# Patient Record
Sex: MALE | Race: WHITE | NOT HISPANIC OR LATINO | Employment: OTHER | URBAN - METROPOLITAN AREA
[De-identification: names, ages, dates, MRNs, and addresses within clinical notes are randomized per-mention and may not be internally consistent; named-entity substitution may affect disease eponyms.]

---

## 2017-01-26 ENCOUNTER — LAB CONVERSION - ENCOUNTER (OUTPATIENT)
Dept: OTHER | Facility: OTHER | Age: 72
End: 2017-01-26

## 2017-01-26 LAB — PROSTATE SPECIFIC ANTIGEN TOTAL (HISTORICAL): 1.6 NG/ML

## 2017-02-23 ENCOUNTER — ALLSCRIPTS OFFICE VISIT (OUTPATIENT)
Dept: OTHER | Facility: OTHER | Age: 72
End: 2017-02-23

## 2017-02-23 LAB
BILIRUB UR QL STRIP: NORMAL
CLARITY UR: NORMAL
COLOR UR: NORMAL
GLUCOSE (HISTORICAL): NORMAL
HGB UR QL STRIP.AUTO: NORMAL
KETONES UR STRIP-MCNC: NORMAL MG/DL
LEUKOCYTE ESTERASE UR QL STRIP: NORMAL
NITRITE UR QL STRIP: NORMAL
PH UR STRIP.AUTO: 6 [PH]
PROT UR STRIP-MCNC: NORMAL MG/DL
SP GR UR STRIP.AUTO: 1.03

## 2017-05-22 ENCOUNTER — APPOINTMENT (OUTPATIENT)
Dept: RADIATION ONCOLOGY | Facility: HOSPITAL | Age: 72
End: 2017-05-22
Attending: RADIOLOGY
Payer: MEDICARE

## 2017-05-22 ENCOUNTER — GENERIC CONVERSION - ENCOUNTER (OUTPATIENT)
Dept: OTHER | Facility: OTHER | Age: 72
End: 2017-05-22

## 2017-05-22 PROCEDURE — 99213 OFFICE O/P EST LOW 20 MIN: CPT | Performed by: RADIOLOGY

## 2017-06-21 ENCOUNTER — ALLSCRIPTS OFFICE VISIT (OUTPATIENT)
Dept: OTHER | Facility: OTHER | Age: 72
End: 2017-06-21

## 2017-08-23 DIAGNOSIS — C61 MALIGNANT NEOPLASM OF PROSTATE (HCC): ICD-10-CM

## 2017-10-17 ENCOUNTER — LAB CONVERSION - ENCOUNTER (OUTPATIENT)
Dept: OTHER | Facility: OTHER | Age: 72
End: 2017-10-17

## 2017-10-17 LAB — PROSTATE SPECIFIC ANTIGEN TOTAL (HISTORICAL): 1.4 NG/ML

## 2017-10-24 ENCOUNTER — ALLSCRIPTS OFFICE VISIT (OUTPATIENT)
Dept: OTHER | Facility: OTHER | Age: 72
End: 2017-10-24

## 2017-10-25 NOTE — PROGRESS NOTES
Assessment  1  Adenocarcinoma of prostate (185) (C61)    Plan  Adenocarcinoma of prostate    · Follow-up visit in 6 months Evaluation and Treatment  Follow-up  Status: Hold For -  Scheduling  Requested for: 24Oct2017   Ordered; For: Adenocarcinoma of prostate; Ordered By: Rose Mary Benitez Performed:  Due: [id number]   · (1) PSA, DIAGNOSTIC (FOLLOW-UP); Status:Active; Requested for:24Apr2018; Perform:[facility]; JGF:[id number];UFHRELB; For:Adenocarcinoma of prostate; Ordered By:Greta Blandon;    Discussion/Summary  Discussion Summary:   PSA trend reviewed with the patient  Remains stable overall  He therefore will return in 6 months for PSA prior to visit with ultrasound postvoid check  Patient's Capacity to Self-Care: Patient is able to Self-Care  Medication SE Review and Pt Understands Tx: The treatment plan was reviewed with the patient/guardian  The patient/guardian understands and agrees with the treatment plan      Chief Complaint  Chief Complaint Free Text Note Form: prostate cancer, s/p radiation therapy      History of Present Illness  HPI: Ariela Holliday is a old gentleman who is here today for previously diagnosed Abel 6 involving 3 cores, 6/15  Lynita Ped Repeat TRUS biopsy results unfortunately suggest progression in disease with 2 cores harboring Abel 7 and a solitary Abel 6 core  Staging CT negative  Since completed XRT 10/16  PSA trend has gone from 3 5-1 6-2 0-1 4 currently  Denies any constitutional complaints  Review of Systems  Complete-Male Urology:   Constitutional: No fever or chills, feels well, no tiredness, no recent weight gain or weight loss  Respiratory: No complaints of shortness of breath, no wheezing, no cough, no SOB on exertion, no orthopnea or PND  Cardiovascular: No complaints of slow heart rate, no fast heart rate, no chest pain, no palpitations, no leg claudication, no lower extremity     Gastrointestinal: No complaints of abdominal pain, no constipation, no nausea or vomiting, no diarrhea or bloody stools  Genitourinary: Empty sensation-- and-- stream quality fair, but-- as noted in HPI,-- no urinary hesitancy,-- no hematuria,-- no incontinence-- and-- no feelings of urinary urgency--    The patient presents with complaints of dysuria (burning at times)  The patient presents with complaints of nocturia (1-3x per night)   Musculoskeletal: No complaints of arthralgia, no myalgias, no joint swelling or stiffness, no limb pain or swelling  Integumentary: No complaints of skin rash or skin lesions, no itching, no skin wound, no dry skin  Hematologic/Lymphatic: No complaints of swollen glands, no swollen glands in the neck, does not bleed easily, no easy bruising  Neurological: No compliants of headache, no confusion, no convulsions, no numbness or tingling, no dizziness or fainting, no limb weakness, no difficulty walking  Active Problems  1  Adenocarcinoma of prostate (185) (C61)   2  BPH with obstruction/lower urinary tract symptoms (600 01,599 69) (N40 1,N13 8)   3  Coronary artery disease (414 00) (I25 10)   4  Elevated PSA (790 93) (R97 20)   5  Environmental and seasonal allergies (477 8) (J30 89)   6  Hypercholesteremia (272 0) (E78 00)   7  Knee pain (719 46) (M25 569)   8  Other nonspecific abnormal finding of lung field (793 19) (R91 8)   9  Primary localized osteoarthritis of knees, bilateral (715 16) (M17 0)    Past Medical History  1  History of Nephrolithiasis (592 0) (N20 0)  Active Problems And Past Medical History Reviewed: The active problems and past medical history were reviewed and updated today  Surgical History  1  History of Cath Placement Of Stent 1   2  History of Needle Biopsy Of Prostate  Surgical History Reviewed: The surgical history was reviewed and updated today  Family History  Mother    1  Family history of Hypertension (V17 49)   2  Family history of Stroke Syndrome (V17 1)  Father    3   Family history of Gout (V18 19)  Family History Reviewed: The family history was reviewed and updated today  Social History   · Marital History - Currently    · Never A Smoker   · No alcohol use   · Retired From Work  Social History Reviewed: The social history was reviewed and updated today  Current Meds   1  Aspirin 81 MG TABS; 1 TAB DAILY; Therapy: (Recorded:[id number]) to Recorded   2  Atorvastatin Calcium 10 MG Oral Tablet; TAKE 1 TABLET DAILY; Therapy: (Recorded:01Klq8068) to Recorded   3  Metoprolol Succinate ER TB24; TAKE 1 TABLET ONCE DAILY; Therapy: (Recorded:76Lbw5126) to Recorded   4  Vitamin D3 2000 UNIT Oral Capsule; take 1 capsule daily; Therapy: (Recorded:23Nov2016) to Recorded  Medication List Reviewed: The medication list was reviewed and updated today  Allergies  1  No Known Drug Allergies    Vitals  Vital Signs    Recorded: 69QTK4942 08:48AM   Heart Rate 62   Systolic 366   Diastolic 60   Height 5 ft 10 in   Weight 143 lb 6 oz   BMI Calculated 20 57   BSA Calculated 1 81     Physical Exam    Constitutional   General appearance: No acute distress, well appearing and well nourished  Pulmonary   Respiratory effort: No increased work of breathing or signs of respiratory distress  Auscultation of lungs: Clear to auscultation  Cardiovascular   Auscultation of heart: Normal rate and rhythm, normal S1 and S2, without murmurs  Examination of extremities for edema and/or varicosities: Normal     Abdomen   Abdomen: Non-tender, no masses  Genitourinary   Anus and perineum: Normal     Scrotum contents: Normal size, no masses  Epididymis: Normal, no masses  Testes: Normal testes, no masses  Urethral meatus: Normal, no lesions  Penis: Normal, no lesions  Digital rectal exam of prostate: Normal size, no masses  -- 3 gram  No nodule     Anus, perineum, and rectum: Normal     Musculoskeletal   Gait and station: Normal     Digits and nails: Normal without clubbing or cyanosis  Inspection/palpation of joints, bones, and muscles: Normal     Skin   Skin and subcutaneous tissue: Normal without rashes or lesions  Lymphatic   Palpation of lymph nodes in groin: Normal     Neurologic   Cranial nerves: Cranial nerves 2-12 intact         Signatures   Electronically signed by : JENNIE Thomson ; Oct 24 2017  9:02AM EST                       (Author)

## 2017-11-16 ENCOUNTER — GENERIC CONVERSION - ENCOUNTER (OUTPATIENT)
Dept: OTHER | Facility: OTHER | Age: 72
End: 2017-11-16

## 2017-11-16 ENCOUNTER — APPOINTMENT (OUTPATIENT)
Dept: RADIOLOGY | Facility: CLINIC | Age: 72
End: 2017-11-16
Payer: MEDICARE

## 2017-11-16 DIAGNOSIS — M25.569 PAIN IN KNEE: ICD-10-CM

## 2017-11-16 DIAGNOSIS — C61 MALIGNANT NEOPLASM OF PROSTATE (HCC): ICD-10-CM

## 2017-11-16 PROCEDURE — 73564 X-RAY EXAM KNEE 4 OR MORE: CPT

## 2017-12-13 ENCOUNTER — GENERIC CONVERSION - ENCOUNTER (OUTPATIENT)
Dept: OTHER | Facility: OTHER | Age: 72
End: 2017-12-13

## 2018-01-12 VITALS
HEART RATE: 62 BPM | DIASTOLIC BLOOD PRESSURE: 60 MMHG | HEIGHT: 70 IN | WEIGHT: 143.38 LBS | BODY MASS INDEX: 20.53 KG/M2 | SYSTOLIC BLOOD PRESSURE: 104 MMHG

## 2018-01-12 VITALS
BODY MASS INDEX: 21.08 KG/M2 | DIASTOLIC BLOOD PRESSURE: 68 MMHG | SYSTOLIC BLOOD PRESSURE: 110 MMHG | WEIGHT: 147.25 LBS | HEIGHT: 70 IN | HEART RATE: 72 BPM

## 2018-01-13 VITALS
BODY MASS INDEX: 20.35 KG/M2 | SYSTOLIC BLOOD PRESSURE: 122 MMHG | HEIGHT: 70 IN | DIASTOLIC BLOOD PRESSURE: 68 MMHG | HEART RATE: 68 BPM | WEIGHT: 142.13 LBS

## 2018-01-14 VITALS
TEMPERATURE: 97 F | DIASTOLIC BLOOD PRESSURE: 60 MMHG | OXYGEN SATURATION: 96 % | BODY MASS INDEX: 19.88 KG/M2 | WEIGHT: 142 LBS | HEART RATE: 70 BPM | HEIGHT: 71 IN | SYSTOLIC BLOOD PRESSURE: 110 MMHG | RESPIRATION RATE: 14 BRPM

## 2018-01-16 NOTE — PROGRESS NOTES
Discussion/Summary  Social Work-Discussion Summary  Luke: Patient is being seen for a distress screenning assessment  LSW reviewed pt's distress thermometer completed by pt on 8/5/2016 in rad onc  Pt rated their distress as a 0/10 and denied psychosocial problems  Based on pt's score, a social work follow up would not be indicated  If pt expresses psychosocial needs, LSW is available to provide cancer care counseling        Signatures   Electronically signed by : DONALD Christine; Aug  9 2016  3:54PM EST                       (Author)

## 2018-01-22 VITALS
WEIGHT: 145 LBS | HEART RATE: 68 BPM | BODY MASS INDEX: 20.76 KG/M2 | HEIGHT: 70 IN | DIASTOLIC BLOOD PRESSURE: 72 MMHG | SYSTOLIC BLOOD PRESSURE: 115 MMHG

## 2018-01-23 NOTE — MISCELLANEOUS
Message  Patient called office and stated he feels as though he has to urinate but nothing comes out  Advised patient we are closing and there are no providers here at office  Patient denies having any recent surgery, is afebrile, no N/V, no pain at this time  He last voided at around 1300 today  Advised patient he will need to report to ER  He is eventually going to feel very uncomfortable and have suprapubic pressure if he does not urinate  Patient wanted an appt but it was explained to him a next day appt will not help him if he is unable to urinate on his own currently  Scheduled an appt for patient at the [address] office on 12/29/17 to f/u on inability to empty bladder  Advised patient we may adjust date of appt depending upon what happens if he goes to ER  Patient is still on the fence about going to ER  Advised patietn if he does not urinate by [phone] tonight, he will need to report to ER  Patient verbalized understanding  Active Problems    1  Adenocarcinoma of prostate (185) (C61)   2  BPH with obstruction/lower urinary tract symptoms (600 01,599 69) (N40 1,N13 8)   3  Coronary artery disease (414 00) (I25 10)   4  Elevated PSA (790 93) (R97 20)   5  Environmental and seasonal allergies (477 8) (J30 89)   6  Hypercholesteremia (272 0) (E78 00)   7  Knee pain (719 46) (M25 569)   8  Other nonspecific abnormal finding of lung field (793 19) (R91 8)   9  Primary localized osteoarthritis of knees, bilateral (715 16) (M17 0)   10  Primary localized osteoarthritis of left knee (715 16) (M17 12)    Current Meds   1  Aspirin 81 MG TABS; 1 TAB DAILY; Therapy: (Recorded:[id number]) to Recorded   2  Atorvastatin Calcium 10 MG Oral Tablet; TAKE 1 TABLET DAILY; Therapy: (Recorded:13Qee3742) to Recorded   3  Metoprolol Succinate ER TB24; TAKE 1 TABLET ONCE DAILY; Therapy: (Recorded:36Hxu3305) to Recorded   4  Vitamin D3 2000 UNIT Oral Capsule; take 1 capsule daily;    Therapy: (Recorded:23Nov2016) to Recorded    Allergies    1   No Known Drug Allergies    Signatures   Electronically signed by : Nicolas Vasquez RN; Dec 13 2017  4:30PM EST                       (Author)

## 2018-02-01 NOTE — PROGRESS NOTES
2/2/2018    Clark Estrada  1945  [id number]        Assessment  Gl 7 (3+4) prostate cancer s/p XRT (10/2016), urinary retention, BPH      Discussion  DMITRIY is a 74 y o  male being managed by Dr Chilango Lawrence  A bladder ultrasound was obtained and his PVR was 42ml  He is doing well with no urinary complaints  He will continue to take Flomax daily  He will return in April with PSA as scheduled  He was instructed to call with any issues  All questions were answered  History of Present Illness  74 y o  male with a history of prostate cancer Gl 7 (3+4) status post XRT (10/2016) was recently evaluated and found to have urinary retention  He was started on Flomax and returns today for follow up  He denies any lower urinary tract symptoms  He has a good stream and complete bladder emptying  He denies any side effects  He is doing well with no changes in his overall health  Review of Systems  Review of Systems   Constitutional: Negative  HENT: Negative  Respiratory: Negative  Cardiovascular: Negative  Gastrointestinal: Negative  Genitourinary: Negative  Musculoskeletal: Negative  Skin: Negative  Neurological: Negative  Hematological: Negative  Urinary Incontinence Screening    Flowsheet Row Most Recent Value   Urinary Incontinence   Urinary Incontinence? No   Incomplete emptying?  -- [Can't Tell]   Urinary frequency? No   Urinary urgency? No   Urinary hesitancy? No   Dysuria (painful difficult urination)? No   Nocturia (waking up to use the bathroom)? Yes [1 time]   Straining (having to push to go)? No   Weak stream?  No   Intermittent stream?  No   Post void dribbling?   No          Past Medical History  Past Medical History:   Diagnosis Date    Nephrolithiasis        Past Social History  Past Surgical History:   Procedure Laterality Date    PROSTATE BIOPSY  06/03/2016    Prostate Needle Biopsy        Past Family History  Family History   Problem Relation Age of Onset  Hypertension Mother     Stroke Mother     Gout Father        Past Social history  Social History     Social History    Marital status:      Spouse name: N/A    Number of children: N/A    Years of education: N/A     Occupational History    Not on file  Social History Main Topics    Smoking status: Never Smoker    Smokeless tobacco: Never Used    Alcohol use No    Drug use: No    Sexual activity: No     Other Topics Concern    Not on file     Social History Narrative    No narrative on file       Current Medications  Current Outpatient Prescriptions   Medication Sig Dispense Refill    aspirin 81 MG tablet Take 1 tablet by mouth daily      atorvastatin (LIPITOR) 10 mg tablet       Cholecalciferol (VITAMIN D3) 2000 units capsule Take 1 capsule by mouth daily      metoprolol succinate (TOPROL-XL) 25 mg 24 hr tablet       tamsulosin (FLOMAX) 0 4 mg        No current facility-administered medications for this visit  Allergies  No Known Allergies    Past Medical History, Social History, Family History, medications and allergies were reviewed  Vitals  Vitals:    02/02/18 1400   BP: 140/72   Pulse: 68   Weight: 66 3 kg (146 lb 3 2 oz)   Height: 5' 10" (1 778 m)       Physical Exam  Skin: warm, dry, intact  Pulmonary: Non-labored breathing  Abdomen: Soft, non-tender, non-distended  Musculoskeletal: AROM with no joint deformity or tenderness    Neurology: Alert and oriented

## 2018-02-02 ENCOUNTER — OFFICE VISIT (OUTPATIENT)
Dept: UROLOGY | Facility: AMBULATORY SURGERY CENTER | Age: 73
End: 2018-02-02
Payer: MEDICARE

## 2018-02-02 VITALS
WEIGHT: 146.2 LBS | HEIGHT: 70 IN | BODY MASS INDEX: 20.93 KG/M2 | HEART RATE: 68 BPM | SYSTOLIC BLOOD PRESSURE: 140 MMHG | DIASTOLIC BLOOD PRESSURE: 72 MMHG

## 2018-02-02 DIAGNOSIS — N13.8 BPH WITH OBSTRUCTION/LOWER URINARY TRACT SYMPTOMS: ICD-10-CM

## 2018-02-02 DIAGNOSIS — N40.1 BPH WITH OBSTRUCTION/LOWER URINARY TRACT SYMPTOMS: ICD-10-CM

## 2018-02-02 DIAGNOSIS — C61 ADENOCARCINOMA OF PROSTATE (HCC): Primary | ICD-10-CM

## 2018-02-02 PROCEDURE — 99213 OFFICE O/P EST LOW 20 MIN: CPT | Performed by: NURSE PRACTITIONER

## 2018-02-02 PROCEDURE — 51798 US URINE CAPACITY MEASURE: CPT | Performed by: NURSE PRACTITIONER

## 2018-02-02 RX ORDER — TAMSULOSIN HYDROCHLORIDE 0.4 MG/1
CAPSULE ORAL
COMMUNITY
Start: 2018-01-17 | End: 2018-02-12 | Stop reason: SDUPTHER

## 2018-02-02 RX ORDER — ATORVASTATIN CALCIUM 10 MG/1
10 TABLET, FILM COATED ORAL DAILY
COMMUNITY
Start: 2018-02-01

## 2018-02-02 RX ORDER — METOPROLOL SUCCINATE 25 MG/1
TABLET, EXTENDED RELEASE ORAL
COMMUNITY
Start: 2018-02-01 | End: 2020-07-09

## 2018-02-02 RX ORDER — ACETAMINOPHEN 160 MG
1 TABLET,DISINTEGRATING ORAL DAILY
COMMUNITY

## 2018-02-12 ENCOUNTER — TELEPHONE (OUTPATIENT)
Dept: UROLOGY | Facility: AMBULATORY SURGERY CENTER | Age: 73
End: 2018-02-12

## 2018-02-12 DIAGNOSIS — N13.8 BPH WITH OBSTRUCTION/LOWER URINARY TRACT SYMPTOMS: Primary | ICD-10-CM

## 2018-02-12 DIAGNOSIS — N40.1 BPH WITH OBSTRUCTION/LOWER URINARY TRACT SYMPTOMS: Primary | ICD-10-CM

## 2018-02-12 DIAGNOSIS — N40.1 BPH WITH OBSTRUCTION/LOWER URINARY TRACT SYMPTOMS: ICD-10-CM

## 2018-02-12 DIAGNOSIS — N13.8 BPH WITH OBSTRUCTION/LOWER URINARY TRACT SYMPTOMS: ICD-10-CM

## 2018-02-12 RX ORDER — TAMSULOSIN HYDROCHLORIDE 0.4 MG/1
0.4 CAPSULE ORAL
Qty: 90 CAPSULE | Refills: 3 | Status: SHIPPED | OUTPATIENT
Start: 2018-02-12 | End: 2018-02-12 | Stop reason: SDUPTHER

## 2018-02-12 NOTE — TELEPHONE ENCOUNTER
Pt called requesting tamsulosin refill for 90 days  Please send to [address]  Pt seen at [facility]

## 2018-02-16 RX ORDER — TAMSULOSIN HYDROCHLORIDE 0.4 MG/1
0.4 CAPSULE ORAL
Qty: 90 CAPSULE | Refills: 0 | Status: SHIPPED | OUTPATIENT
Start: 2018-02-16 | End: 2018-07-06 | Stop reason: SDUPTHER

## 2018-02-19 ENCOUNTER — TELEPHONE (OUTPATIENT)
Dept: UROLOGY | Facility: AMBULATORY SURGERY CENTER | Age: 73
End: 2018-02-19

## 2018-02-19 NOTE — TELEPHONE ENCOUNTER
Patient left message stating he still has not received tamsulosin from [facility], and is almost out  He asked if he could take his son's tamsulosin HCL 0 4 mg to tide him over until he receives it from mail order  Called patient and had to leave message stating it is unknown to advise patient over the phone regarding another person's medication  If he has doubts and wants to look into it further, advised him to take it to pharmacy to see if they can determine it is definitely the same med  Call back with any questions

## 2018-04-18 LAB — PSA SERPL-MCNC: 0.4 NG/ML

## 2018-04-23 NOTE — PROGRESS NOTES
4/26/2018    Samuel Wilson  1945  [id number]    Discussion and Plan    Postvoid residual is nominal at 33 cc  PSA is also reduced to 0 4  He will continue Flomax  Follow up in 6 months with PSA prior to visit  All questions answered at this time  1  Adenocarcinoma of prostate ([facility] )  - PSA Total, Diagnostic; Future    2  BPH with obstruction/lower urinary tract symptoms  - PSA Total, Diagnostic; Future    Assessment      Patient Active Problem List   Diagnosis    Adenocarcinoma of prostate ([facility] )    BPH with obstruction/lower urinary tract symptoms    Elevated PSA       History of Present Illness    Jeniffer Mazariegos is a 74 y o  male seen today in regards to a history of prostate cancer Gl 7 (3+4) status post XRT (10/2016) was recently evaluated and found to have urinary retention  He was started on Flomax and returns today for follow up  He denies any lower urinary tract symptoms  He has a fair stream and complete bladder emptying  He denies any side effects  He is doing well with no changes in his overall health  Urinary Symptom Assessment        Past Medical History  Past Medical History:   Diagnosis Date    Nephrolithiasis        Past Social History  Past Surgical History:   Procedure Laterality Date    PROSTATE BIOPSY  06/03/2016    Prostate Needle Biopsy        Past Family History  Family History   Problem Relation Age of Onset    Hypertension Mother     Stroke Mother     Gout Father        Past Social history  Social History     Social History    Marital status:      Spouse name: N/A    Number of children: N/A    Years of education: N/A     Occupational History    Not on file       Social History Main Topics    Smoking status: Never Smoker    Smokeless tobacco: Never Used    Alcohol use No    Drug use: No    Sexual activity: No     Other Topics Concern    Not on file     Social History Narrative    No narrative on file       Current Medications  Current Outpatient Prescriptions Medication Sig Dispense Refill    aspirin 81 MG tablet Take 1 tablet by mouth daily      atorvastatin (LIPITOR) 10 mg tablet       Cholecalciferol (VITAMIN D3) 2000 units capsule Take 1 capsule by mouth daily      metoprolol succinate (TOPROL-XL) 25 mg 24 hr tablet       tamsulosin (FLOMAX) 0 4 mg Take 1 capsule (0 4 mg total) by mouth daily with dinner 90 capsule 0     No current facility-administered medications for this visit  Allergies  No Known Allergies    Past Medical History, Social History, Family History, medications and allergies were reviewed  Review of Systems  Review of Systems   Constitutional: Negative  HENT: Negative  Eyes: Negative  Respiratory: Negative  Cardiovascular: Negative  Gastrointestinal: Negative  Endocrine: Negative  Genitourinary: Negative for decreased urine volume, difficulty urinating and hematuria  Musculoskeletal: Negative  Skin: Negative  Neurological: Negative  Hematological: Negative  Psychiatric/Behavioral: Negative  Vitals  Vitals:    04/26/18 0851   BP: 140/78   BP Location: Left arm   Patient Position: Sitting   Cuff Size: Adult   Pulse: 70   Weight: 66 8 kg (147 lb 3 2 oz)   Height: 5' 10" (1 778 m)         Physical Exam    Physical Exam   Constitutional: He is oriented to person, place, and time  He appears well-developed and well-nourished  HENT:   Head: Normocephalic and atraumatic  Eyes: Pupils are equal, round, and reactive to light  Neck: Normal range of motion  Cardiovascular: Normal rate, regular rhythm and normal heart sounds  Pulmonary/Chest: Effort normal and breath sounds normal  No accessory muscle usage  No respiratory distress  Abdominal: Soft  Normal appearance and bowel sounds are normal  There is no tenderness  Genitourinary: Rectum normal, prostate normal and penis normal  No penile tenderness  Genitourinary Comments: Greater than 30 g and smooth     Musculoskeletal: Normal range of motion  Neurological: He is alert and oriented to person, place, and time  Skin: Skin is warm, dry and intact  Psychiatric: He has a normal mood and affect  His speech is normal  Cognition and memory are normal    Nursing note and vitals reviewed  Results    Below listed labs, pathology results, and radiology images were personally reviewed:    No results found for: PSA  Lab Results   Component Value Date    CALCIUM 9 3 06/18/2016     06/18/2016    K 3 9 06/18/2016    CO2 29 06/18/2016     06/18/2016    BUN 20 06/18/2016    CREATININE 0 99 06/18/2016     No results found for: WBC, HGB, HCT, MCV, PLT    No results found for this or any previous visit (from the past 1 hour(s)) ]    Component      Latest Ref Rng & Units 5/16/2016 1/25/2017 10/16/2017   PROSTATE SPECIFIC ANTIGEN TOTAL      < OR = 4 0 ng/mL 3 5 1 6 1 4     Component      Latest Ref Rng & Units 4/17/2018   PROSTATE SPECIFIC ANTIGEN TOTAL      < OR = 4 0 ng/mL 0 4     Post Void Residual Measurement:  After voiding to completion the patient was brought to the exam room and positioned supine    Residual urine volume was measured with an ultrasound at:    33 ml

## 2018-04-24 DIAGNOSIS — C61 MALIGNANT NEOPLASM OF PROSTATE (HCC): ICD-10-CM

## 2018-04-26 ENCOUNTER — OFFICE VISIT (OUTPATIENT)
Dept: UROLOGY | Facility: CLINIC | Age: 73
End: 2018-04-26
Payer: MEDICARE

## 2018-04-26 VITALS
DIASTOLIC BLOOD PRESSURE: 78 MMHG | BODY MASS INDEX: 21.07 KG/M2 | HEART RATE: 70 BPM | WEIGHT: 147.2 LBS | SYSTOLIC BLOOD PRESSURE: 140 MMHG | HEIGHT: 70 IN

## 2018-04-26 DIAGNOSIS — N40.1 BPH WITH OBSTRUCTION/LOWER URINARY TRACT SYMPTOMS: ICD-10-CM

## 2018-04-26 DIAGNOSIS — C61 ADENOCARCINOMA OF PROSTATE (HCC): Primary | ICD-10-CM

## 2018-04-26 DIAGNOSIS — N13.8 BPH WITH OBSTRUCTION/LOWER URINARY TRACT SYMPTOMS: ICD-10-CM

## 2018-04-26 PROCEDURE — 99214 OFFICE O/P EST MOD 30 MIN: CPT | Performed by: UROLOGY

## 2018-04-26 PROCEDURE — 51798 US URINE CAPACITY MEASURE: CPT | Performed by: UROLOGY

## 2018-05-06 ENCOUNTER — HOSPITAL ENCOUNTER (EMERGENCY)
Facility: HOSPITAL | Age: 73
Discharge: HOME | End: 2018-05-06
Attending: EMERGENCY MEDICINE | Admitting: EMERGENCY MEDICINE
Payer: MEDICARE

## 2018-05-06 VITALS
DIASTOLIC BLOOD PRESSURE: 61 MMHG | WEIGHT: 149 LBS | SYSTOLIC BLOOD PRESSURE: 111 MMHG | RESPIRATION RATE: 18 BRPM | HEART RATE: 55 BPM | TEMPERATURE: 97.5 F | BODY MASS INDEX: 21.38 KG/M2 | OXYGEN SATURATION: 97 %

## 2018-05-06 DIAGNOSIS — R33.9 URINARY RETENTION: Primary | ICD-10-CM

## 2018-05-06 LAB
BACTERIA UR QL AUTO: ABNORMAL /HPF
BILIRUB UR QL STRIP: NEGATIVE
CLARITY UR: CLEAR
COLOR UR: YELLOW
COLOR, POC: YELLOW
GLUCOSE UR STRIP-MCNC: NEGATIVE MG/DL
HGB UR QL STRIP.AUTO: ABNORMAL
KETONES UR STRIP-MCNC: NEGATIVE MG/DL
LEUKOCYTE ESTERASE UR QL STRIP: NEGATIVE
NITRITE UR QL STRIP: NEGATIVE
NON-SQ EPI CELLS URNS QL MICRO: ABNORMAL /HPF
PH UR STRIP.AUTO: 5 [PH] (ref 4.5–8)
PROT UR STRIP-MCNC: NEGATIVE MG/DL
RBC #/AREA URNS AUTO: ABNORMAL /HPF
SP GR UR STRIP.AUTO: 1.01 (ref 1–1.03)
UROBILINOGEN UR QL STRIP.AUTO: 0.2 E.U./DL
WBC #/AREA URNS AUTO: ABNORMAL /HPF

## 2018-05-06 PROCEDURE — 99283 EMERGENCY DEPT VISIT LOW MDM: CPT

## 2018-05-06 PROCEDURE — 81002 URINALYSIS NONAUTO W/O SCOPE: CPT | Performed by: EMERGENCY MEDICINE

## 2018-05-06 PROCEDURE — 81001 URINALYSIS AUTO W/SCOPE: CPT

## 2018-05-06 NOTE — ED NOTES
Bladder Tkjn=991on  Dr Tiny Alas aware  Patient reports 8/10 pain          Nando Stevens RN  05/06/18 4744

## 2018-05-06 NOTE — DISCHARGE INSTRUCTIONS
Urinary Retention in Men   WHAT YOU NEED TO KNOW:   What is urinary retention? Urinary retention is a condition that develops when your bladder does not empty completely when you urinate  What causes urinary retention? · An enlarged prostate    · Blockages, such as a stone, growth, or narrowing of your urethra    · A weak bladder muscle    · Nerve damage from diabetes, stroke, or spinal cord injury    · Bladder diverticula, which are pockets of urine that form in your bladder and do not empty    · Certain medicines, such as narcotics, antihistamines, or antidepressants  What are the signs and symptoms of urinary retention? · Frequent urination, or the urge to urinate right after you finish    · An urge to urinate, but your urine does not come out or dribbles out slowly and weakly    · Frequent urine leaks that happen during the day or while you sleep    · Pain or pressure when you urinate    · Pain or stiffness in your abdomen, lower back, hips, or upper thighs    · Blood in your urine  How is urinary retention diagnosed? Your healthcare provider will ask about your health history and the medicines you take  He will press or tap on your lower abdomen  You may need any of the following tests:  · A digital rectal exam  is when healthcare providers carefully feel the size of your prostate  · A post void residual  test will show how much urine is left in your bladder after you urinate  You will be asked to urinate and then healthcare providers will use a small ultrasound machine to check how much urine is left in your bladder  · Blood or urine tests  may show infection or prostate specific antigen (PSA) levels  PSA may be elevated in prostate cancer  · An ultrasound  uses sound waves to show pictures on a monitor  An ultrasound may be done to show bladder stones, infection, or other problems  · A CT scan , or CAT scan, is a type of x-ray that is taken of your prostate, kidneys, and bladder   The pictures may show what is causing your urinary retention  You may be given a dye before the pictures are taken to help healthcare providers see the pictures better  Tell the healthcare provider if you have ever had an allergic reaction to contrast dye  How is urinary retention treated? · A Virgen catheter  is a tube put into your bladder to drain urine into a bag  Keep the bag below your waist  This will prevent urine from flowing back into your bladder and causing an infection or other problems  Also, keep the tube free of kinks so the urine will drain properly  Do not pull on the catheter  This can cause pain and bleeding, and may cause the catheter to come out  · Medicines  can help decrease the size of your prostate, fight infection, and help you urinate more easily  · Surgery  may be needed to treat the condition that is causing your urinary retention  When should I contact my healthcare provider? · You have a fever  · You have pain when you urinate  · You have blood in your urine  · You have problems with your catheter  · You have questions or concerns about your condition or care  When should I seek immediate care or call 911? · You have severe abdominal pain  · You are breathing faster than usual     · Your heartbeat is faster than usual     · Your face, hands, feet, or ankles are swollen  CARE AGREEMENT:   You have the right to help plan your care  Learn about your health condition and how it may be treated  Discuss treatment options with your caregivers to decide what care you want to receive  You always have the right to refuse treatment  The above information is an  only  It is not intended as medical advice for individual conditions or treatments  Talk to your doctor, nurse or pharmacist before following any medical regimen to see if it is safe and effective for you    © 2017 [employer] Information is for End User's use only and may not be sold, redistributed or otherwise used for commercial purposes  All illustrations and images included in CareNotes® are the copyrighted property of A D A M , [employer]  or Spike Bermudez  Urinary Retention in Men   WHAT YOU NEED TO KNOW:   Urinary retention is a condition that develops when your bladder does not empty completely when you urinate  DISCHARGE INSTRUCTIONS:   Medicines:   · Medicines  can help decrease the size of your prostate, fight infection, and help you urinate more easily  · Take your medicine as directed  Contact your healthcare provider if you think your medicine is not helping or if you have side effects  Tell him or her if you are allergic to any medicine  Keep a list of the medicines, vitamins, and herbs you take  Include the amounts, and when and why you take them  Bring the list or the pill bottles to follow-up visits  Carry your medicine list with you in case of an emergency  Virgen catheter care: You may need a Virgen catheter for up to 2 weeks at home  Healthcare providers will give you a smaller leg bag to collect urine  Keep the bag below your waist  This will prevent urine from flowing back into your bladder and causing an infection or other problems  Also, keep the tube free of kinks so the urine will drain properly  Do not pull on the catheter  This can cause pain and bleeding, and may cause the catheter to come out  Ask your healthcare provider or urologist for more information on Virgen catheter care  Urinate regularly:  When your catheter is removed, do not let your bladder become too full before you urinate  Set regular times each day to urinate  Urinate as soon as you feel the need or at least every 3 hours while you are awake  Do not drink liquids before you go to bed  Urinate right before you go to bed  Follow up with your healthcare provider or urologist as directed:  Write down your questions so you remember to ask them during your visits     Contact your healthcare provider or urologist if:   · You have a fever  · You have pain when you urinate  · You have blood in your urine  · You have problems with your catheter  · You have questions or concerns about your condition or care  Return to the emergency department if:   · You have severe abdominal pain  · You are breathing faster than usual     · Your heartbeat is faster than usual     · Your face, hands, feet, or ankles are swollen  © 2017 [facility] Information is for End User's use only and may not be sold, redistributed or otherwise used for commercial purposes  All illustrations and images included in CareNotes® are the copyrighted property of A D A M , Inc  or Spike Lara  The above information is an  only  It is not intended as medical advice for individual conditions or treatments  Talk to your doctor, nurse or pharmacist before following any medical regimen to see if it is safe and effective for you  Virgen Catheter Placement and Care   WHAT YOU NEED TO KNOW:   A Virgen catheter is a sterile tube that is inserted into your bladder to drain urine  It is also called an indwelling urinary catheter  The tip of the catheter has a small balloon filled with solution that holds the catheter inside your bladder  DISCHARGE INSTRUCTIONS:   Return to the emergency department if:   · Your catheter comes out  · You suddenly have material that looks like sand in the tubing or drainage bag  · No urine is draining into the bag and you have checked the system  · You have pain in your hip, back, pelvis, or lower abdomen  · You are confused or cannot think clearly  Contact your healthcare provider if:   · You have a fever  · You have bladder spasms for more than 1 day after the catheter is placed  · You see blood in the tubing or drainage bag  · You have a rash or itching where the catheter tube is secured to your skin      · Urine leaks from or around the catheter, tubing, or drainage bag  · The closed drainage system has accidently come open or apart  · You see a layer of crystals inside the tubing  · You have questions or concerns about your condition or care  Care for your Virgen catheter:   · Clean your genital area 2 times every day  Clean your catheter and the area around where it was inserted  Use soap and water  Clean your anal opening and catheter area after every bowel movement  · Secure the catheter tube  so you do not pull or move the catheter  This helps prevent pain and bladder spasms  Healthcare providers will show you how to use medical tape or a strap to secure the catheter tube to your body  · Keep a closed drainage system  Your Virgen catheter should always be attached to the drainage bag to form a closed system  Do not disconnect any part of the closed system unless you need to change the bag  Care for your drainage bag:   · Ask if a leg bag is right for you  A leg bag can be worn under your clothes  Ask your healthcare provider for more information about a leg bag  · Keep the drainage bag below the level of your waist   This helps stop urine from moving back up the tubing and into your bladder  Do not loop or kink the tubing  This can cause urine to back up and collect in your bladder  Do not let the drainage bag touch or lie on the floor  · Empty the drainage bag when needed  The weight of a full drainage bag can be painful  Empty the drainage bag every 3 to 6 hours or when it is ? full  · Clean and change the drainage bag as directed  Ask your healthcare provider how often you should change the drainage bag and what cleaning solution to use  Wear disposable gloves when you change the bag  Do not allow the end of the catheter or tubing to touch anything  Clean the ends with an alcohol pad before you reconnect them    What to do if problems develop:   · No urine is draining into the bag: ¨ Check for kinks in the tubing and straighten them out  ¨ Check the tape or strap used to secure the catheter tube to your skin  Make sure it is not blocking the tube  ¨ Make sure you are not sitting or lying on the tubing  ¨ Make sure the urine bag is hanging below the level of your waist     · Urine leaks from or around the catheter, tubing, or drainage bag:  Check if the closed drainage system has accidently come open or apart  Clean the catheter and tubing ends with a new alcohol pad and reconnect them  Prevent an infection:   · Wash your hands often  Wash before and after you touch your catheter, tubing, or drainage bag  Use soap and water  Wear clean disposable gloves when you care for your catheter or disconnect the drainage bag  Wash your hands before you prepare or eat food  · Drink liquids as directed  Ask your healthcare provider how much liquid to drink each day and which liquids are best for you  Liquids will help flush your kidneys and bladder to help prevent infection  Follow up with your healthcare provider as directed:  Write down your questions so you remember to ask them during your visits  © 2017 [facility] [place][note 2] Information is for End User's use only and may not be sold, redistributed or otherwise used for commercial purposes  All illustrations and images included in CareNotes® are the copyrighted property of A [employer] M , Inc  or Spike Bermudez  The above information is an  only  It is not intended as medical advice for individual conditions or treatments  Talk to your doctor, nurse or pharmacist before following any medical regimen to see if it is safe and effective for you

## 2018-05-06 NOTE — ED NOTES
Virgen catheter inserted at this time, 650 ml of clear yellow urine drained into bag  Patient stated the he feels relief from discomfort in bladder at this time       Annita Barr RN  05/06/18 1659

## 2018-05-06 NOTE — ED PROVIDER NOTES
History  Chief Complaint   Patient presents with    Urinary Retention     Reports urinary retention  Last voided small amount 3 hours ago  Complaint of 8/10 left flank pain  History provided by:  Patient   used: No    Urinary Frequency   Location:  Bladder  Quality:  Urinary retention  Severity:  Moderate  Onset quality:  Gradual  Timing:  Constant  Progression:  Worsening  Chronicity:  New  Associated symptoms: abdominal pain and nausea    Associated symptoms: no rash and no vomiting        Prior to Admission Medications   Prescriptions Last Dose Informant Patient Reported? Taking? Cholecalciferol (VITAMIN D3) 2000 units capsule  Self Yes No   Sig: Take 1 capsule by mouth daily   aspirin 81 MG tablet  Self Yes No   Sig: Take 1 tablet by mouth daily   atorvastatin (LIPITOR) 10 mg tablet  Self Yes No   metoprolol succinate (TOPROL-XL) 25 mg 24 hr tablet  Self Yes No   tamsulosin (FLOMAX) 0 4 mg  Self No No   Sig: Take 1 capsule (0 4 mg total) by mouth daily with dinner      Facility-Administered Medications: None       Past Medical History:   Diagnosis Date    Nephrolithiasis        Past Surgical History:   Procedure Laterality Date    PROSTATE BIOPSY  06/03/2016    Prostate Needle Biopsy        Family History   Problem Relation Age of Onset    Hypertension Mother     Stroke Mother     Gout Father      I have reviewed and agree with the history as documented  Social History   Substance Use Topics    Smoking status: Never Smoker    Smokeless tobacco: Never Used    Alcohol use No        Review of Systems   Gastrointestinal: Positive for abdominal pain and nausea  Negative for vomiting  Genitourinary: Positive for decreased urine volume, difficulty urinating, frequency and urgency  Negative for discharge, dysuria, flank pain, penile pain, penile swelling and scrotal swelling  Skin: Negative for color change, pallor, rash and wound     Allergic/Immunologic: Negative for immunocompromised state  All other systems reviewed and are negative  Physical Exam  ED Triage Vitals [05/06/18 0053]   Temperature Pulse Respirations Blood Pressure SpO2   97 5 °F (36 4 °C) 77 18 156/72 98 %      Temp Source Heart Rate Source Patient Position - Orthostatic VS BP Location FiO2 (%)   Oral Monitor Sitting Left arm --      Pain Score       8           Orthostatic Vital Signs  Vitals:    05/06/18 0053 05/06/18 0232   BP: 156/72 111/61   Pulse: 77 55   Patient Position - Orthostatic VS: Sitting Sitting       Physical Exam   Constitutional: He is oriented to person, place, and time  He appears well-developed and well-nourished  He appears distressed  HENT:   Head: Normocephalic and atraumatic  Eyes: Conjunctivae are normal    Cardiovascular: Normal rate, regular rhythm and intact distal pulses  Pulmonary/Chest: Effort normal  No stridor  No respiratory distress  Abdominal: Soft  He exhibits distension  There is tenderness in the suprapubic area  There is no rebound and no guarding  Genitourinary: Testes normal and penis normal  Right testis shows no swelling and no tenderness  Left testis shows no swelling and no tenderness  Circumcised  Musculoskeletal: Normal range of motion  He exhibits no edema, tenderness or deformity  Neurological: He is alert and oriented to person, place, and time  Skin: Skin is warm and dry  Psychiatric: He has a normal mood and affect  Nursing note and vitals reviewed        ED Medications  Medications - No data to display    Diagnostic Studies  Results Reviewed     Procedure Component Value Units Date/Time    POCT urinalysis dipstick [[id number]]  (Normal) Resulted:  05/06/18 0256    Lab Status:  Final result Specimen:  Urine Updated:  05/06/18 0257     Color, UA yellow    Urine Microscopic [[id number]] Collected:  05/06/18 0256    Lab Status:  No result Specimen:  Urine from Urine, Indwelling Virgen Catheter     ED Urine Macroscopic [[id number]]  (Abnormal) Collected:  05/06/18 0256    Lab Status:  Final result Specimen:  Urine Updated:  05/06/18 0254     Color, UA Yellow     Clarity, UA Clear     pH, UA 5 0     Leukocytes, UA Negative     Nitrite, UA Negative     Protein, UA Negative mg/dl      Glucose, UA Negative mg/dl      Ketones, UA Negative mg/dl      Urobilinogen, UA 0 2 E U /dl      Bilirubin, UA Negative     Blood, UA Small (A)     Specific Harold, UA 1 015    Narrative:       CLINITEK RESULT                 No orders to display              Procedures  Procedures       Phone Contacts  ED Phone Contact    ED Course                               MDM  Number of Diagnoses or Management Options  Urinary retention: new and requires workup  Diagnosis management comments: Patient presents for worsening urinary retention  States that since his prostate surgery he has never had a good stream   He states that tonight he is only getting small dribbles out when he tries to urinate  Patient has 406 mL of urine on his bladder scan  Because of how uncomfortable he is we will place a Virgen with a leg bag and check his urine  3:05 AM  Over 600mL of urine produced  Negative UA  Will d/c home with leg bag and urology f/u  Amount and/or Complexity of Data Reviewed  Clinical lab tests: ordered and reviewed  Tests in the medicine section of CPT®: reviewed and ordered  Review and summarize past medical records: yes    Patient Progress  Patient progress: improved    CritCare Time    Disposition  Final diagnoses:   Urinary retention     Time reflects when diagnosis was documented in both MDM as applicable and the Disposition within this note     Time User Action Codes Description Comment    5/6/2018  3:03 AM Corina Augustin Add [R33 9] Urinary retention       ED Disposition     ED Disposition Condition Comment    Discharge  Morgan Burt discharge to home/self care      Condition at discharge: Good        Follow-up Information     Follow up With Specialties Details Why Contact Info    Matt Morrissey, [address]   [address] [place] [address] Neeraj Benito      Urology        Rita Hart MD Urology Call today To schedule an appointment as soon as you can [facility]  [phone]          Patient's Medications   Discharge Prescriptions    No medications on file     No discharge procedures on file      ED Provider  Electronically Signed by           Mickie Lou DO  05/06/18 6139

## 2018-05-07 ENCOUNTER — PROCEDURE VISIT (OUTPATIENT)
Dept: UROLOGY | Facility: CLINIC | Age: 73
End: 2018-05-07
Payer: MEDICARE

## 2018-05-07 ENCOUNTER — TELEPHONE (OUTPATIENT)
Dept: UROLOGY | Facility: AMBULATORY SURGERY CENTER | Age: 73
End: 2018-05-07

## 2018-05-07 VITALS
HEART RATE: 80 BPM | WEIGHT: 148 LBS | DIASTOLIC BLOOD PRESSURE: 72 MMHG | BODY MASS INDEX: 21.19 KG/M2 | HEIGHT: 70 IN | SYSTOLIC BLOOD PRESSURE: 144 MMHG

## 2018-05-07 DIAGNOSIS — N40.1 BPH WITH OBSTRUCTION/LOWER URINARY TRACT SYMPTOMS: ICD-10-CM

## 2018-05-07 DIAGNOSIS — N13.8 BPH WITH OBSTRUCTION/LOWER URINARY TRACT SYMPTOMS: ICD-10-CM

## 2018-05-07 PROCEDURE — 99211 OFF/OP EST MAY X REQ PHY/QHP: CPT | Performed by: UROLOGY

## 2018-05-07 NOTE — TELEPHONE ENCOUNTER
Spoke with patient  He would like to have Virgen removed today, and be taught CIC  Rachelle Colon is OK with plan  Patient will need 1 month f/u after CIC teaching today    He is scheduled to have Virgen removed and CIC teaching at the [facility] office with diagnostic RN today at 11:00 AM

## 2018-05-07 NOTE — PROGRESS NOTES
5/7/2018  Carlene Pereira is a 74 y o  male  [id number]    Diagnosis:  Chief Complaint     Urinary Retention        Patient presents for weiss removal cic teaching    Plan:  · Catheter samples provided, 16 fr, coude tip  · Patient will attempt to void normally, if unable to void in 6 hours he will catheterize himself  Alternatively if voiding well, He will catheterize himself tomorrow just to be sure he is comfortable with technique  He knows he may start CIC at any point if unable to urinate  · If he has to start using the catheters because he is unable to void he will call the office to discuss voiding pattern and number of times per day he is catheterizing, so a catheter supply order can be initiated  · He will FU in one month with a pvr and to check on progress  Patient agrees to plan  Procedure:  Patient recently seen in the office less then a month ago with a nominal pvr  Ended up in the ER over the weekend in urinary retention again  H/o prostate cancer s/p radiation, taking tamsulosin  He wishes to have catheter removed and to learn CIC just in case he has difficulty again  He is not interested in doing CIC unless unable to urinate  CIC technique reviewed at length with patient  Reviewed male anatomy as well  Written instructions provided as well  Reviewed that if patient unable to urinate at all he may need to catheterize himself 4-6 x day, alternatively if he is able to urinate but not emptying his bladder he may only need to cath 1-3 x daily  At this point patient is hoping flow will return to normal and he will not have catheterize at all but would like to learn so that if he has any trouble he can take care of it himself  Weiss catheter removed after deflation of an intact balloon  Patient tolerated well  Then accompanied the patient into the bathroom and patient was able to demonstrate appropriate cic technique under nursing supervision and guidance    He was instructed that even if he is able to void well on his own to try cic at least once tomorrow to be sure he is comfortable with the technique should he need to cath while uncomfortable and unable to urinate in the future  Patient is agreeable to this plan  Written instructions provided      Gill Hendricks, KIMBERLY MckeeN

## 2018-05-07 NOTE — PATIENT INSTRUCTIONS
How to Catheterize Yourself (Man)   WHAT YOU NEED TO KNOW:   How do I catheterize myself? · Try to urinate before you catheterize yourself  · Gather all the items you will need:  Ask your healthcare provider where to get the supplies to catheterize yourself  ¨ A clean catheter    ¨ Water-based lubricating jelly    ¨ Container to collect urine    ¨ Bowl of warm water, soap, washcloth, and hand towel    ¨ Waterproof pad or bath towel    · Wash your hands with warm water and soap  · Get into position for inserting your catheter:  Lie or sit down with your knees bent  Put a towel or waterproof pad under your penis  You may also  front of the toilet  Make sure the other end of the catheter is pointed into a container or down toward the toilet  · Clean yourself:  Wash your penis with soap, warm water, and a washcloth  If you are not circumcised, pull back the foreskin  Wash the head and the urinary meatus (the opening where urine comes out)  Rinse and dry your penis  Put the container close to you to collect the urine  · Put water-based lubricating jelly on the first 7 to 10 inches of the catheter: This will help decrease discomfort during the procedure  · Insert the catheter:      ¨ With one hand, hold your penis straight out from your body  With your other hand, slowly put the catheter into the urinary meatus  ¨ Gently push the catheter about 7 to 10 inches into your penis until urine begins to come out  Once urine starts to flow, push the catheter up 1 inch more and hold it in place until the urine stops  · Remove the catheter when you are finished:  When urine no longer comes out of the catheter, pinch it closed with the hand that was holding your penis  Gently and slowly pull the catheter out  Keep the end of the catheter up to prevent dribbling of urine  If you are not circumcised, pull the foreskin down over the head of the penis  · Clean the catheter:   If your catheter is reusable, follow your healthcare provider's instructions to clean it  If your catheter is a single-use catheter, throw it away  When should I catheterize myself? Catheterize yourself at least 4 times each day and at bedtime  How can I help prevent an infection? · Wash your hands:  Always wash your hands with soap and water before you catheterize yourself  · Clean and dry reusable catheters:  Clean all reusable catheters with soap and warm water after every use  Sterilize all reusable catheters in a pan of boiling water for 20 minutes  Set the catheters on a clean paper towel to dry  · Store catheters correctly:  Store dry catheters in a clean plastic bag  Throw away torn, hardened, or cracked catheters  · Wear cotton boxers or underwear: These allow airflow and keep your genital area dry  · Drink plenty of liquids:  Ask your healthcare provider how much liquid to drink each day and which liquids are best for you  This helps to keep you from getting a urinary infection  What catheter problems may I have? · No urine comes out of the catheter:  Gently rotate the catheter in case it is blocked  Try gently pushing the catheter a little further up into the penis or pulling it back  Check also that the catheter opening is not blocked by lubricant or mucus  · Urine leakage between catheterizations: You may have some urine leakage if you have been drinking more liquids than usual, especially those containing caffeine or alcohol  It could also mean that you have a bladder infection  If you are having a problem with urine leakage, try catheterizing yourself more often  If you think you have an infection, contact your healthcare provider  · Difficulty inserting or removing the catheter: If you have pain or discomfort when you insert your catheter, use more lubricant  It is common to meet some resistance when you are pushing the catheter past your prostate   The prostate is the gland that makes semen  Take a deep breath and try to relax before you push the catheter in further  Breathe in, then continue pushing the catheter in as you slowly let your breath out  · Blood on the catheter or in your urine: This may happen if your meatus or urethra is too dry  Try using more lubricating jelly to prevent irritating your meatus and urethra  Make sure you drink enough liquids  Blood in the urine could also mean you have an infection  When should I contact my healthcare provider? · You have a fever  · Your urine is thick, cloudy, or has mucus in it  · You have red specks in your urine or your urine looks pink or red  · Your urine has a strong smell  · You have pain or burning in your urethra, bladder, or abdomen  · It is too painful, difficult, or uncomfortable to insert your catheter far enough to start your urine flow  · You have questions or concerns about your condition or care  CARE AGREEMENT:   You have the right to help plan your care  Learn about your health condition and how it may be treated  Discuss treatment options with your caregivers to decide what care you want to receive  You always have the right to refuse treatment  The above information is an  only  It is not intended as medical advice for individual conditions or treatments  Talk to your doctor, nurse or pharmacist before following any medical regimen to see if it is safe and effective for you  © 2017 [employer] Information is for End User's use only and may not be sold, redistributed or otherwise used for commercial purposes  All illustrations and images included in CareNotes® are the copyrighted property of [employer]  or [employer]  **If you need to use the catheter call the office to let us know  If you need to cath on a regular basis we will need to set you up with a catheter supply company to deliver catheter right to your door    Additionally if you need to cath on a regular basis you should record residuals (the amount drained via catheter left over in the bladder after you urinate normally) for a week prior to your next appt  We can review this over the phone when you call

## 2018-05-08 ENCOUNTER — TELEPHONE (OUTPATIENT)
Dept: UROLOGY | Facility: AMBULATORY SURGERY CENTER | Age: 73
End: 2018-05-08

## 2018-05-08 NOTE — TELEPHONE ENCOUNTER
Patient was taught how to CIC yesterday in the office  He is calling the office today to ask if it is OK to use KY warming jelly for lubricating the catheters  Advised patient he should stick to using the ordinary KY jelly instead  Patient verbalized understanding

## 2018-06-11 ENCOUNTER — OFFICE VISIT (OUTPATIENT)
Dept: UROLOGY | Facility: AMBULATORY SURGERY CENTER | Age: 73
End: 2018-06-11
Payer: MEDICARE

## 2018-06-11 VITALS
WEIGHT: 148 LBS | HEIGHT: 70 IN | SYSTOLIC BLOOD PRESSURE: 124 MMHG | HEART RATE: 60 BPM | BODY MASS INDEX: 21.19 KG/M2 | DIASTOLIC BLOOD PRESSURE: 74 MMHG

## 2018-06-11 DIAGNOSIS — C61 ADENOCARCINOMA OF PROSTATE (HCC): Primary | ICD-10-CM

## 2018-06-11 PROCEDURE — 99213 OFFICE O/P EST LOW 20 MIN: CPT | Performed by: NURSE PRACTITIONER

## 2018-06-11 PROCEDURE — 51798 US URINE CAPACITY MEASURE: CPT | Performed by: NURSE PRACTITIONER

## 2018-06-11 NOTE — PROGRESS NOTES
6/11/2018    Mary Ellen Ambriz  1945  [id number]        Assessment  Gl 7 (3+4) prostate cancer s/p XRT (10/2016)  BPH with urinary retention    Discussion  Magdalena Valencia is a 74 y o  male being managed by Dr Blandon  A bladder ultrasound was obtained today in the office and PVR was 18 ml  He is comfortable with his current urinary pattern and denies any complaints  He will continue to take Flomax daily  PSA as of 4/2018 was 0 4  He will return in November as scheduled with a PSA prior  All questions answered  History of Present Illness  74 y o  male with a history of Gl 7 (3+4) prostate cancer s/p XRT (10/2016), BPH and urinary retention presents today for  follow up  He has not required intermittent straight catheterization and has not had any other episodes of urinary retention  He continues to take Flomax daily  He denies any lower urinary tract symptoms and has a good stream  He denies any changes in his overall health  Review of Systems  Review of Systems   Constitutional: Negative  HENT: Negative  Respiratory: Negative  Cardiovascular: Negative  Gastrointestinal: Negative  Genitourinary:        As per HPI   Musculoskeletal: Negative  Skin: Negative  Neurological: Negative  Hematological: Negative  AUA SYMPTOM SCORE      Most Recent Value   AUA SYMPTOM SCORE   How often have you had a sensation of not emptying your bladder completely after you finished urinating? 0   How often have you had to urinate again less than two hours after you finished urinating? 0   How often have you found you stopped and started again several times when you urinate?  0   How often have you found it difficult to postpone urination? 0   How often have you had a weak urinary stream?  0   How often have you had to push or strain to begin urination? 0   How many times did you most typically get up to urinate from the time you went to bed at night until the time you got up in the morning?   2 Quality of Life: If you were to spend the rest of your life with your urinary condition just the way it is now, how would you feel about that?  1   AUA SYMPTOM SCORE  2        Urinary Incontinence Screening      Most Recent Value   Urinary Incontinence   Urinary Incontinence? No   Incomplete emptying? No   Urinary frequency? No   Urinary urgency? No   Urinary hesitancy? No   Dysuria (painful difficult urination)? Yes ["once in while"]   Nocturia (waking up to use the bathroom)? No   Straining (having to push to go)? No   Weak stream?  No   Intermittent stream?  No   Post void dribbling? No            Past Medical History  Past Medical History:   Diagnosis Date    Nephrolithiasis        Past Surgical History  Past Surgical History:   Procedure Laterality Date    PROSTATE BIOPSY  06/03/2016    Prostate Needle Biopsy         Past Family History  Family History   Problem Relation Age of Onset    Hypertension Mother     Stroke Mother     Gout Father        Past Social history  Social History     Social History    Marital status:      Spouse name: N/A    Number of children: N/A    Years of education: N/A     Occupational History    Not on file  Social History Main Topics    Smoking status: Never Smoker    Smokeless tobacco: Never Used    Alcohol use No    Drug use: No    Sexual activity: No     Other Topics Concern    Not on file     Social History Narrative    No narrative on file       Current Medications  Current Outpatient Prescriptions   Medication Sig Dispense Refill    aspirin 81 MG tablet Take 1 tablet by mouth daily      atorvastatin (LIPITOR) 10 mg tablet       Cholecalciferol (VITAMIN D3) 2000 units capsule Take 1 capsule by mouth daily      metoprolol succinate (TOPROL-XL) 25 mg 24 hr tablet       tamsulosin (FLOMAX) 0 4 mg Take 1 capsule (0 4 mg total) by mouth daily with dinner 90 capsule 0     No current facility-administered medications for this visit  Allergies  No Known Allergies    Past Medical History, Social History, Family History, medications and allergies were reviewed and updated as appropriate  Vitals  Vitals:    06/11/18 1347   BP: 124/74   Pulse: 60   Weight: 67 1 kg (148 lb)   Height: 5' 10" (1 778 m)       Physical Exam  Skin: warm, dry, intact  Pulmonary: Non-labored breathing  Abdomen: Soft, non-tender, non-distended  Musculoskeletal: AROM with no joint deformity or tenderness    Neurology: Alert and oriented        Results    Lab Results   Component Value Date    CALCIUM 9 3 06/18/2016     06/18/2016    K 3 9 06/18/2016    CO2 29 06/18/2016     06/18/2016    BUN 20 06/18/2016    CREATININE 0 99 06/18/2016

## 2018-06-22 ENCOUNTER — TELEPHONE (OUTPATIENT)
Dept: UROLOGY | Facility: AMBULATORY SURGERY CENTER | Age: 73
End: 2018-06-22

## 2018-06-22 NOTE — TELEPHONE ENCOUNTER
Patient is calling office asking if it is OK for him to hold tamsulosin for 5 days prior to his cataract surgery next week  Please advise

## 2018-06-22 NOTE — TELEPHONE ENCOUNTER
Patient instructed acceptable to stop flomax  Instructed patient he may develop urinary symptoms with stopping the flomax  Patient instructed if unable to urinate he needs to call office right away  Per patient he has performed CIC in the past  Patient questions if he can do the same if needed  Instructed patient acceptable to perform CIC if needed, but should still call the office  Patient instructed to restart flomax when able

## 2018-06-22 NOTE — TELEPHONE ENCOUNTER
[id number] Aleksandra Rodriguez for patient to hold Flomax as requested  Please advise patient that he may see a change in his urinary symptoms and should notify our office if unable to urinate

## 2018-07-06 DIAGNOSIS — N40.1 BPH WITH OBSTRUCTION/LOWER URINARY TRACT SYMPTOMS: ICD-10-CM

## 2018-07-06 DIAGNOSIS — N13.8 BPH WITH OBSTRUCTION/LOWER URINARY TRACT SYMPTOMS: ICD-10-CM

## 2018-07-06 RX ORDER — TAMSULOSIN HYDROCHLORIDE 0.4 MG/1
0.4 CAPSULE ORAL
Qty: 90 CAPSULE | Refills: 3 | Status: SHIPPED | OUTPATIENT
Start: 2018-07-06 | End: 2018-10-15 | Stop reason: SDUPTHER

## 2018-07-06 NOTE — TELEPHONE ENCOUNTER
----- Message from Mannie Saunders sent at 7/6/2018  2:00 PM EDT -----  Regarding: Prescription Question  Contact: [phone]  No remaining refills on tamsulosin 0 4 mg capsule  Please fax more refills to [facility] at [phone]    After doing so, please text me on my cell phone at [phone] to confirm the prescription renewal     Thank you!

## 2018-08-08 ENCOUNTER — TELEPHONE (OUTPATIENT)
Dept: UROLOGY | Facility: MEDICAL CENTER | Age: 73
End: 2018-08-08

## 2018-08-08 NOTE — TELEPHONE ENCOUNTER
Spoke with patient and reminded him he was instructed on CIC with Royal Rob just a couple months ago  Reviewed with patient how to CIC several times over the phone  He states he has been noticing over the past 3 weeks his urinary stream has been becoming weaker, but patient admits he did not CIC as instructed  Advised patient on urinating on his own, and if he cannot or does not feel like his bladder is empty, he should CIC to see how much more urine was still in his bladder  This was explained to patient many times over the phone  Patient is on his way to [facility] now to  a few more samples of catheters  Scheduled appt with Rosy Rowland at [address] [address] office on 8/10/18 to discuss Flomax not working per patient, and how to manage weak urinary stream   He is aware he should be performing CIC after voiding on own to ensure bladder is empty until his appt on Friday  This will prevent him from having to go to ER or having indwelling Virgen catheter

## 2018-08-09 NOTE — PROGRESS NOTES
8/10/2018      Chief Complaint   Patient presents with    Benign Prostatic Hypertrophy    Urinary Retention       Assessment and Plan    74 y o  male managed by Dr Andreia James    1  Salem 7 (3+4) prostate cancer s/p XRT (10/2016)  - PSA 0 4  - due again 10/2018    2  Post radiation LUTs  - uroflow unable to be obtained   - PVR 71mL  - CIC as needed  - trial double daily flomax, reduce back to one pill if any hypotension  - FU 6 weeks for symptom reassessment and PVR/uroflow testing  - if no improvement and abnormalities in PVR/uroflow will schedule a cystoscopy for possible TURP, due to radiation would not recommend urolift      History of Present Illness  Bhavesh Gonzalez is a 74 y o  male here for evaluation of a weak urinary stream   Has Salem 7 prostate cancer and is status post radiation completed 10/2016  He continues on Flomax and was taught how to CIC due to issues with retention  He has not been needing to do this  His lower urinary tract symptoms are listed as below  PSA trend since XRT has gone from 3 5-1 6-2 0-1 4-0 4  Review of Systems   Constitutional: Negative for activity change, chills and fever  Gastrointestinal: Negative for abdominal distention and abdominal pain  Musculoskeletal: Negative for back pain and gait problem  Psychiatric/Behavioral: Negative for behavioral problems and confusion  Past Medical History  Past Medical History:   Diagnosis Date    Nephrolithiasis        Past Social History  Past Surgical History:   Procedure Laterality Date    CATARACT EXTRACTION Right 06/2018    PROSTATE BIOPSY  06/03/2016    Prostate Needle Biopsy      History   Smoking Status    Never Smoker   Smokeless Tobacco    Never Used       Past Family History  Family History   Problem Relation Age of Onset    Hypertension Mother     Stroke Mother     Gout Father        Past Social history  Social History     Social History    Marital status:       Spouse name: N/A    Number of children: N/A    Years of education: N/A     Occupational History    Not on file  Social History Main Topics    Smoking status: Never Smoker    Smokeless tobacco: Never Used    Alcohol use No    Drug use: No    Sexual activity: No     Other Topics Concern    Not on file     Social History Narrative    No narrative on file       Current Medications  Current Outpatient Prescriptions   Medication Sig Dispense Refill    aspirin 81 MG tablet Take 1 tablet by mouth daily      atorvastatin (LIPITOR) 10 mg tablet Take 10 mg by mouth daily        Cholecalciferol (VITAMIN D3) 2000 units capsule Take 1 capsule by mouth daily      metoprolol succinate (TOPROL-XL) 25 mg 24 hr tablet Take 25 mg by mouth daily        tamsulosin (FLOMAX) 0 4 mg Take 1 capsule (0 4 mg total) by mouth daily with dinner 90 capsule 3     No current facility-administered medications for this visit  Allergies  No Known Allergies      The following portions of the patient's history were reviewed and updated as appropriate: allergies, current medications, past medical history, past social history, past surgical history and problem list       Vitals  Vitals:    08/10/18 0918   BP: 118/64   BP Location: Left arm   Patient Position: Sitting   Cuff Size: Adult   Pulse: 64   Weight: 66 3 kg (146 lb 3 2 oz)   Height: 5' 10" (1 778 m)         Physical Exam  Constitutional   General appearance: Patient is seated and in no acute distress, well appearing and well nourished  Head and Face   Head and face: Normal     Eyes   Conjunctiva and lids: No erythema, swelling or discharge  Ears, Nose, Mouth, and Throat   Hearing: Normal     Pulmonary   Respiratory effort: No increased work of breathing or signs of respiratory distress  Cardiovascular   Examination of extremities for edema and/or varicosities: Normal     Abdomen   Abdomen: Non-tender, no masses      Musculoskeletal   Gait and station: Normal    Skin   Skin and subcutaneous tissue: Warm, dry, and intact  No visible lesions or rashes    Psychiatric   Judgment and insight: Normal  Recent and remote memory:  Normal  Mood and affect: Normal      Results  Recent Results (from the past 1 hour(s))   POCT Measure PVR    Collection Time: 08/10/18  9:26 AM   Result Value Ref Range    POST-VOID RESIDUAL VOLUME, ML POC 71 mL   ]  No results found for: PSA  Lab Results   Component Value Date    CALCIUM 9 3 06/18/2016     06/18/2016    K 3 9 06/18/2016    CO2 29 06/18/2016     06/18/2016    BUN 20 06/18/2016    CREATININE 0 99 06/18/2016     No results found for: WBC, HGB, HCT, MCV, PLT      Orders  Orders Placed This Encounter   Procedures    POCT Measure PVR

## 2018-08-10 ENCOUNTER — OFFICE VISIT (OUTPATIENT)
Dept: UROLOGY | Facility: CLINIC | Age: 73
End: 2018-08-10
Payer: MEDICARE

## 2018-08-10 ENCOUNTER — TELEPHONE (OUTPATIENT)
Dept: UROLOGY | Facility: CLINIC | Age: 73
End: 2018-08-10

## 2018-08-10 VITALS
HEART RATE: 64 BPM | HEIGHT: 70 IN | WEIGHT: 146.2 LBS | DIASTOLIC BLOOD PRESSURE: 64 MMHG | SYSTOLIC BLOOD PRESSURE: 118 MMHG | BODY MASS INDEX: 20.93 KG/M2

## 2018-08-10 DIAGNOSIS — C61 ADENOCARCINOMA OF PROSTATE (HCC): ICD-10-CM

## 2018-08-10 DIAGNOSIS — N40.1 BPH WITH OBSTRUCTION/LOWER URINARY TRACT SYMPTOMS: Primary | ICD-10-CM

## 2018-08-10 DIAGNOSIS — N13.8 BPH WITH OBSTRUCTION/LOWER URINARY TRACT SYMPTOMS: Primary | ICD-10-CM

## 2018-08-10 LAB — POST-VOID RESIDUAL VOLUME, ML POC: 71 ML

## 2018-08-10 PROCEDURE — 51798 US URINE CAPACITY MEASURE: CPT | Performed by: PHYSICIAN ASSISTANT

## 2018-08-10 PROCEDURE — 99213 OFFICE O/P EST LOW 20 MIN: CPT | Performed by: PHYSICIAN ASSISTANT

## 2018-08-10 NOTE — TELEPHONE ENCOUNTER
Call to patient's mobile phone and left a detailed message requesting that he come in to the office with a full bladder this morning for his appt at [facility] as Maria G Conn is requesting that we do a uroflow/PVR

## 2018-08-13 ENCOUNTER — TELEPHONE (OUTPATIENT)
Dept: UROLOGY | Facility: AMBULATORY SURGERY CENTER | Age: 73
End: 2018-08-13

## 2018-08-13 NOTE — TELEPHONE ENCOUNTER
PT thought he was going to get a call from the nurse to explain a few things - he stated that his medication was doubled which is helping him urinate, but he is not sure if he should keep taking that much  He has an appointment that is pending with Dr Heather Gan for 11/1/18 and he is wondering if he should just keep that and not follow up in September with an AP  I stated I would express all these concerns to the nurse and they can give him a call

## 2018-08-13 NOTE — TELEPHONE ENCOUNTER
Left detailed voicemail for patient stating the necessity of keeping his scheduled appt to f/u on increase in Flomax  It is best for this time to keep the appt in [address] since there are not any available appts in [place] for next week  He was strongly encouraged to not adjust his own dose of Flomax and to keep scheduled appt 8/20/18  Call back with any questions or concerns

## 2018-08-13 NOTE — TELEPHONE ENCOUNTER
----- Message from Velasquez Magallanes sent at 8/13/2018  7:51 AM EDT -----  Regarding: FW: Visit Follow-Up Question  Contact: [phone]      ----- Message -----  From: Angela Duncan  Sent: 8/10/2018   7:46 PM  To: [facility]  Subject: Visit Follow-Up Question                         Today received appointment for [facility] on September 20th, at 11:15 am     Want to change appointment to [address] [place] [address] location  Please call me at [phone] to make new appointment

## 2018-08-17 ENCOUNTER — TELEPHONE (OUTPATIENT)
Dept: UROLOGY | Facility: AMBULATORY SURGERY CENTER | Age: 73
End: 2018-08-17

## 2018-08-17 NOTE — TELEPHONE ENCOUNTER
Patient left message on RN voicemail stating he will be holding his tamsulosin from 8/25/18 thru 8/30/18 due to Dr Margot Kimble doing cataract surgery on 8/30/18  Spoke with patient to confirm message was received  Advised patient while he is not taking tamsulosin, if he notices difficulty urinating, he should resume CIC until he is able to restart tamsulosin  Patient verbalized understanding and will keep upcoming appt on 9/14/18 to f/u on increase in tamsulosin dose

## 2018-08-28 ENCOUNTER — CONSULT (OUTPATIENT)
Dept: FAMILY MEDICINE CLINIC | Facility: CLINIC | Age: 73
End: 2018-08-28
Payer: MEDICARE

## 2018-08-28 VITALS
HEIGHT: 70 IN | DIASTOLIC BLOOD PRESSURE: 70 MMHG | WEIGHT: 146 LBS | RESPIRATION RATE: 18 BRPM | HEART RATE: 54 BPM | BODY MASS INDEX: 20.9 KG/M2 | SYSTOLIC BLOOD PRESSURE: 110 MMHG | TEMPERATURE: 97 F

## 2018-08-28 DIAGNOSIS — Z01.818 PREPROCEDURAL GENERAL PHYSICAL EXAMINATION: Primary | ICD-10-CM

## 2018-08-28 PROBLEM — H25.11 NUCLEAR SCLEROSIS OF RIGHT EYE: Status: ACTIVE | Noted: 2018-06-29

## 2018-08-28 PROBLEM — J30.89 ENVIRONMENTAL AND SEASONAL ALLERGIES: Status: ACTIVE | Noted: 2017-05-22

## 2018-08-28 PROCEDURE — 99214 OFFICE O/P EST MOD 30 MIN: CPT | Performed by: NURSE PRACTITIONER

## 2018-08-28 RX ORDER — ATROPINE SULFATE 10 MG/ML
1 SOLUTION/ DROPS OPHTHALMIC
COMMUNITY
End: 2019-04-30

## 2018-08-28 RX ORDER — DIFLUPREDNATE 0.5 MG/ML
1 EMULSION OPHTHALMIC
COMMUNITY
End: 2019-04-30

## 2018-08-28 NOTE — PROGRESS NOTES
[facility] PRACTICE PRE-OPERATIVE EVALUATION  [facility] - [address] [place], [place] [address]ED [facility]    NAME: Justin Darby  AGE: 74 y o  SEX: male  : 1945     DATE: 2018    [facility] Pre-Operative Evaluation      Chief Complaint: Pre-operative Evaluation     Surgery: Left Eye Cataract surgery  Anticipated Date of Surgery: 2018  Referring Provider: Gilford Libel MD      History of Present Illness:     Justin Darby is a 74 y o  male who presents to the office today for a preoperative consultation at the request of surgeon, Gilford Libel MD, who plans on performing left cataract removal on 2018  Planned anesthesia is IV sedation  Patient has a bleeding risk of: no recent abnormal bleeding  Patient does have objections to receiving blood products if needed  Current anti-platelet/anti-coagulation medications that the patient is prescribed includes: Patient has stopped his flomax and ASA until post operatively        Assessment of Chronic Conditions:   Coronary artery disease with 2 stent placements  Adenocarcinoma of Prostate  Osteoporosis  Hyperlipidemia  Abnormal lung field     Assessment of Cardiac Risk:  · Denies unstable or severe angina or MI in the last 6 weeks or history of stent placement in the last year   · Denies decompensated heart failure (e g  New onset heart failure, NYHA functional class IV heart failure, or worsening existing heart failure)  · Denies significant arrhythmias such as high grade AV block, symptomatic ventricular arrhythmia, newly recognized ventricular tachycardia, supraventricular tachycardia with resting heart rate >100, or symptomatic bradycardia  · Denies severe heart valve disease including aortic stenosis or symptomatic mitral stenosis     Exercise Capacity:  · Able to walk 4 blocks without symptoms?: Yes  · Able to walk 2 flights without symptoms?: Yes    Prior Anesthesia Reactions: No     Personal history of venous thromboembolic disease? No    History of steroid use for >2 weeks within last year? No         Review of Systems:     Review of Systems    Current Problem List:     Patient Active Problem List   Diagnosis    Adenocarcinoma of prostate ([address] )    BPH with obstruction/lower urinary tract symptoms    Elevated PSA    Chronic coronary artery disease    Environmental and seasonal allergies    Hyperlipidemia    Nuclear sclerosis of right eye    Osteoporosis    Lung field abnormal    Primary localized osteoarthritis of knees, bilateral    Vitamin D deficiency    Preprocedural general physical examination       Allergies:     No Known Allergies    Current Medications:       Current Outpatient Prescriptions:     atorvastatin (LIPITOR) 10 mg tablet, Take 10 mg by mouth daily  , Disp: , Rfl:     atropine (ISOPTO ATROPINE) 1 % ophthalmic solution, Apply 1 drop to eye, Disp: , Rfl:     Cholecalciferol (VITAMIN D3) 2000 units capsule, Take 1 capsule by mouth daily, Disp: , Rfl:     Difluprednate 0 05 % EMUL, Apply 1 drop to eye, Disp: , Rfl:     metoprolol succinate (TOPROL-XL) 25 mg 24 hr tablet, Take 25 mg by mouth daily  , Disp: , Rfl:     Nepafenac 0 3 % SUSP, Apply 1 drop to eye, Disp: , Rfl:     aspirin 81 MG tablet, Take 1 tablet by mouth daily, Disp: , Rfl:     tamsulosin (FLOMAX) 0 4 mg, Take 1 capsule (0 4 mg total) by mouth daily with dinner (Patient not taking: Reported on 8/28/2018 ), Disp: 90 capsule, Rfl: 3    Past Medical History:       Past Medical History:   Diagnosis Date    Nephrolithiasis         Past Surgical History:   Procedure Laterality Date    CATARACT EXTRACTION Right 06/2018    PROSTATE BIOPSY  06/03/2016    Prostate Needle Biopsy         Family History   Problem Relation Age of Onset    Hypertension Mother     Stroke Mother     Gout Father         Social History     Social History    Marital status:       Spouse name: N/A    Number of children: N/A    Years of education: N/A     Occupational History    Not on file  Social History Main Topics    Smoking status: Never Smoker    Smokeless tobacco: Never Used    Alcohol use No    Drug use: No    Sexual activity: No     Other Topics Concern    Not on file     Social History Narrative    No narrative on file        Physical Exam:     /70 (BP Location: Left arm, Patient Position: Sitting, Cuff Size: Standard)   Pulse (!) 54   Temp (!) 97 °F (36 1 °C) (Temporal)   Resp 18   Ht 5' 10" (1 778 m)   Wt 66 2 kg (146 lb)   BMI 20 95 kg/m²     Physical Exam   Constitutional: He is oriented to person, place, and time  He appears well-developed and well-nourished  HENT:   Head: Normocephalic  Right Ear: External ear normal    Left Ear: External ear normal    Nose: Nose normal    Mouth/Throat: Oropharynx is clear and moist    Eyes: EOM are normal  Left pupil is not reactive (secondary to preoperative eye drops)  Neck: Normal range of motion  Neck supple  No thyromegaly present  Cardiovascular: Normal rate and regular rhythm  Pulmonary/Chest: Effort normal and breath sounds normal    Abdominal: Soft  Bowel sounds are normal  He exhibits no distension and no mass  There is no tenderness  There is no rebound and no guarding  Musculoskeletal: Normal range of motion  Lymphadenopathy:     He has no cervical adenopathy  Neurological: He is alert and oriented to person, place, and time  Skin: Skin is warm and dry  Psychiatric: He has a normal mood and affect  His behavior is normal         Data:     Pre-operative work-up    Laboratory Results: Will get from [facility]     EKG: I have personally reviewed pertinent reports        Chest x-ray: None ordered      Previous cardiopulmonary studies within the past year:  · Echocardiogram: 6/5/2018  · Cardiac Catheterization: No  · Stress Test: about 5 months ago  · Pulmonary Function Testing: No      Assessment & Recommendations:     No diagnosis found     Pre-Op Evaluation Assessment  74 y o  male with planned surgery: left cataract  Known risk factors for perioperative complications: Coronary disease  Current medications which may produce withdrawal symptoms if withheld perioperatively: ASA and Flomax  Pre-Op Evaluation Plan  1  Further preoperative workup as follows:   - Will request labs from [facility]    2  Medication Management/Recommendations:   - Patient has been instructed to avoid aspirin containing medications or non-steroidal anti-inflammatory drugs for the week preceding surgery  3  Prophylaxis for cardiac events with perioperative beta-blockers: Not required  4  Patient requires further consultation with: None    Clearance  Patient is CLEARED for surgery without any additional cardiac testing       MOE Sandoval  [facility] [address] [address]  Phone#  [phone]  Fax#  [phone]

## 2018-08-31 ENCOUNTER — TELEPHONE (OUTPATIENT)
Dept: FAMILY MEDICINE CLINIC | Facility: CLINIC | Age: 73
End: 2018-08-31

## 2018-08-31 NOTE — TELEPHONE ENCOUNTER
Patient called he said that he had cataract surgery yesterday and that his pulse was 32 he is taking metoprolol 25 mg and he was told by the surgery center that he should call PCP office to see what he should do in regards to his pulse being low he can be reached at [phone]    TY

## 2018-08-31 NOTE — TELEPHONE ENCOUNTER
Patient called he said that he had cataract surgery yesterday and that his pulse was 32 he is taking metoprolol 25 mg and he was told by the surgery center that he should call PCP office to see what he should do in regards to his pulse being low he can be reached at [phone]    TY

## 2018-08-31 NOTE — TELEPHONE ENCOUNTER
Recently rechecked his pulse yesterday afternoon after surgery 48  BP   99/60   This am   56  /64   Noon today 62  100/63   Didn't take the medication today because of the reading  Anesthesia told to hold      He is very concerns   Advised it was probably the anesthesia  Given if pulse <90 do not take   Advised would have Dr Yue Danielson review this and decide on Tuesday what he wants him to do   He will write down his BP and pulse

## 2018-09-01 NOTE — TELEPHONE ENCOUNTER
Call " have him continue to ck bp and pulse this week : come for EKG sometime this week as well ; continue the   meds as directed previously : likely low pulse from the anesthesai

## 2018-09-11 NOTE — PROGRESS NOTES
9/13/2018      Chief Complaint   Patient presents with    Benign Prostatic Hypertrophy     6 week f/u       Assessment and Plan    74 y o  male managed by Dr Oskar Noyola    1  [place] (3+4) prostate cancer s/p XRT (10/2016)  - PSA 0 4  - due again 10/2018     2  LUTs  - PVR 81mL  - CIC as needed  - continue flomax, wishes to cut back to 1 pill, discussed he can do this and if needed go back to 2    FU as scheduled with Dr Oskar Noyola in November for PSA check       History of Present Illness  Vivienne Deal is a 74 y o  male here for follow up evaluation of [place] 7 prostate cancer status post radiation therapy completed 10/2016 and lower urinary tract symptoms  He presents today doing well  He is currently taking 2 pills of Flomax  His lower urinary tract symptoms are listed as below  In addition to this does his AUA symptom score  Of note his PSA trend since XRT has gone from 3 5-1 6-2 0-1 4-0 4  Review of Systems   Constitutional: Negative for activity change, chills and fever  Gastrointestinal: Negative for abdominal distention and abdominal pain  Musculoskeletal: Negative for back pain and gait problem  Psychiatric/Behavioral: Negative for behavioral problems and confusion  Urinary Incontinence Screening      Most Recent Value   Urinary Incontinence   Urinary Incontinence? No   Incomplete emptying? No   Urinary frequency? No   Urinary urgency? No   Urinary hesitancy? No   Dysuria (painful difficult urination)? No   Nocturia (waking up to use the bathroom)? Yes [twice]   Straining (having to push to go)? No   Weak stream?  No   Intermittent stream?  No   Post void dribbling? No        AUA SYMPTOM SCORE      Most Recent Value   AUA SYMPTOM SCORE   How often have you had a sensation of not emptying your bladder completely after you finished urinating? 0   How often have you had to urinate again less than two hours after you finished urinating?   1   How often have you found you stopped and started again several times when you urinate?  0   How often have you found it difficult to postpone urination? 0   How often have you had a weak urinary stream?  0   How often have you had to push or strain to begin urination? 0   How many times did you most typically get up to urinate from the time you went to bed at night until the time you got up in the morning? 2   Quality of Life: If you were to spend the rest of your life with your urinary condition just the way it is now, how would you feel about that?  1   AUA SYMPTOM SCORE  3          Past Medical History  Past Medical History:   Diagnosis Date    Diverticulitis w/o hemorrhage 2008    Dyspepsia 2005    Nephrolithiasis     PVC (premature ventricular contraction) 03/30/2012       Past Social History  Past Surgical History:   Procedure Laterality Date    CATARACT EXTRACTION Right 06/2018    CATARACT EXTRACTION Left 08/30/2018    COLONOSCOPY W/ POLYPECTOMY  04/04/2006    PROSTATE BIOPSY  06/03/2016    Prostate Needle Biopsy     SHOULDER SURGERY Left     Arthrocentesis of the shoulder sub-=acronial bursa area    UMBILICAL HERNIA REPAIR      UPPER GASTROINTESTINAL ENDOSCOPY       History   Smoking Status    Never Smoker   Smokeless Tobacco    Never Used       Past Family History  Family History   Problem Relation Age of Onset    Hypertension Mother     Stroke Mother     Diabetes Mother     Gout Father     Alzheimer's disease Father     Diabetes Family         Mellitus       Past Social history  Social History     Social History    Marital status:      Spouse name: N/A    Number of children: N/A    Years of education: N/A     Occupational History    Not on file       Social History Main Topics    Smoking status: Never Smoker    Smokeless tobacco: Never Used    Alcohol use No    Drug use: No    Sexual activity: No     Other Topics Concern    Not on file     Social History Narrative    No narrative on file       Current Medications  Current Outpatient Prescriptions   Medication Sig Dispense Refill    aspirin 81 MG tablet Take 1 tablet by mouth daily      atorvastatin (LIPITOR) 10 mg tablet Take 10 mg by mouth daily        Cholecalciferol (VITAMIN D3) 2000 units capsule Take 1 capsule by mouth daily      Difluprednate 0 05 % EMUL Apply 1 drop to eye      metoprolol succinate (TOPROL-XL) 25 mg 24 hr tablet 1/2 tab daily       Nepafenac 0 3 % SUSP Apply 1 drop to eye      tamsulosin (FLOMAX) 0 4 mg Take 1 capsule (0 4 mg total) by mouth daily with dinner (Patient taking differently: Take 0 8 mg by mouth daily with dinner  ) 90 capsule 3    atropine (ISOPTO ATROPINE) 1 % ophthalmic solution Apply 1 drop to eye       No current facility-administered medications for this visit  Allergies  No Known Allergies      The following portions of the patient's history were reviewed and updated as appropriate: allergies, current medications, past medical history, past social history, past surgical history and problem list       Vitals  Vitals:    09/13/18 1114   BP: 130/60   BP Location: Right arm   Patient Position: Sitting   Cuff Size: Adult   Pulse: 74   Weight: 67 9 kg (149 lb 12 8 oz)   Height: 5' 10" (1 778 m)         Physical Exam  Constitutional   General appearance: Patient is seated and in no acute distress, well appearing and well nourished  Head and Face   Head and face: Normal     Eyes   Conjunctiva and lids: No erythema, swelling or discharge  Ears, Nose, Mouth, and Throat   Hearing: Normal     Pulmonary   Respiratory effort: No increased work of breathing or signs of respiratory distress  Cardiovascular   Examination of extremities for edema and/or varicosities: Normal     Abdomen   Abdomen: Non-tender, no masses  Musculoskeletal   Gait and station: Normal     Skin   Skin and subcutaneous tissue: Warm, dry, and intact  No visible lesions or rashes    Psychiatric   Judgment and insight: Normal  Recent and remote memory:  Normal  Mood and affect: Normal      Results  Recent Results (from the past 1 hour(s))   POCT Measure PVR    Collection Time: 09/13/18 11:33 AM   Result Value Ref Range    POST-VOID RESIDUAL VOLUME, ML POC 81 mL   ]  Lab Results   Component Value Date    PSA 0 4 04/17/2018     Lab Results   Component Value Date    CALCIUM 9 3 06/18/2016     06/18/2016    K 3 9 06/18/2016    CO2 29 06/18/2016     06/18/2016    BUN 20 06/18/2016    CREATININE 0 99 06/18/2016     No results found for: WBC, HGB, HCT, MCV, PLT        Orders  Orders Placed This Encounter   Procedures    POCT Measure PVR    POCT urine dip

## 2018-09-13 ENCOUNTER — OFFICE VISIT (OUTPATIENT)
Dept: UROLOGY | Facility: CLINIC | Age: 73
End: 2018-09-13
Payer: MEDICARE

## 2018-09-13 ENCOUNTER — TELEPHONE (OUTPATIENT)
Dept: FAMILY MEDICINE CLINIC | Facility: CLINIC | Age: 73
End: 2018-09-13

## 2018-09-13 VITALS
DIASTOLIC BLOOD PRESSURE: 60 MMHG | BODY MASS INDEX: 21.45 KG/M2 | HEIGHT: 70 IN | WEIGHT: 149.8 LBS | SYSTOLIC BLOOD PRESSURE: 130 MMHG | HEART RATE: 74 BPM

## 2018-09-13 DIAGNOSIS — C61 PROSTATE CANCER (HCC): ICD-10-CM

## 2018-09-13 DIAGNOSIS — N40.0 BENIGN PROSTATIC HYPERPLASIA, UNSPECIFIED WHETHER LOWER URINARY TRACT SYMPTOMS PRESENT: Primary | ICD-10-CM

## 2018-09-13 LAB
POST-VOID RESIDUAL VOLUME, ML POC: 81 ML
SL AMB  POCT GLUCOSE, UA: NORMAL
SL AMB LEUKOCYTE ESTERASE,UA: NORMAL
SL AMB POCT BILIRUBIN,UA: NORMAL
SL AMB POCT BLOOD,UA: NORMAL
SL AMB POCT CLARITY,UA: CLEAR
SL AMB POCT COLOR,UA: YELLOW
SL AMB POCT KETONES,UA: NORMAL
SL AMB POCT NITRITE,UA: NORMAL
SL AMB POCT PH,UA: 6.5
SL AMB POCT SPECIFIC GRAVITY,UA: 1
SL AMB POCT URINE PROTEIN: NORMAL
SL AMB POCT UROBILINOGEN: NORMAL

## 2018-09-13 PROCEDURE — 99213 OFFICE O/P EST LOW 20 MIN: CPT | Performed by: PHYSICIAN ASSISTANT

## 2018-09-13 PROCEDURE — 51798 US URINE CAPACITY MEASURE: CPT | Performed by: PHYSICIAN ASSISTANT

## 2018-09-13 PROCEDURE — 81002 URINALYSIS NONAUTO W/O SCOPE: CPT | Performed by: PHYSICIAN ASSISTANT

## 2018-09-13 NOTE — TELEPHONE ENCOUNTER
Pt called stating he was seen with Patria Napier about 3 weeks ago and she recommended he get bloodwork done  There are no current orders in chart   He states he also needs a PSA done for his urologist  Pt would like the script to be mailed to him as well as Dr Nenita Calvert giving him a call back

## 2018-09-13 NOTE — TELEPHONE ENCOUNTER
Pt called stating he was seen with Unique Nassar about 3 weeks ago and she recommended he get bloodwork done  There are no current orders in chart  He states he also needs a PSA done for his urologist  Pt would like the script to be mailed to him as well as Dr Ovidio Medina giving him a call back

## 2018-09-13 NOTE — TELEPHONE ENCOUNTER
I would have put in orders for bloodwork if I had wanted them, he had just had them done  Urologist should order the PSA

## 2018-09-14 ENCOUNTER — TELEPHONE (OUTPATIENT)
Dept: FAMILY MEDICINE CLINIC | Facility: CLINIC | Age: 73
End: 2018-09-14

## 2018-09-14 DIAGNOSIS — R97.20 PSA ELEVATION: ICD-10-CM

## 2018-09-14 DIAGNOSIS — E78.5 HYPERLIPIDEMIA, UNSPECIFIED HYPERLIPIDEMIA TYPE: Primary | ICD-10-CM

## 2018-09-14 DIAGNOSIS — I25.10 CHRONIC CORONARY ARTERY DISEASE: ICD-10-CM

## 2018-09-14 NOTE — TELEPHONE ENCOUNTER
Call from pt with some concerns as to if he should have blood work done or make a appointment to see Jameson Galindo  Pt last blood work was in 02/2017 in [facility]  Please advise if think blood work or appointment is needed thank you

## 2018-09-14 NOTE — TELEPHONE ENCOUNTER
Per Surjit these lab were to be generated   per pt request these orders were to be mailed to his home pt has his labs done through the [facility]  [facility]

## 2018-09-20 ENCOUNTER — TELEPHONE (OUTPATIENT)
Dept: FAMILY MEDICINE CLINIC | Facility: CLINIC | Age: 73
End: 2018-09-20

## 2018-09-21 ENCOUNTER — TELEPHONE (OUTPATIENT)
Dept: UROLOGY | Facility: AMBULATORY SURGERY CENTER | Age: 73
End: 2018-09-21

## 2018-09-21 NOTE — TELEPHONE ENCOUNTER
Patient walked into office and asked for more catheters to perform CIC  Provided samples of catheters of same brand he was asking for, but provided 16 Fr instead of 14 Fr due to not having any of that type    [facility] [employer] paperwork completed for patient and faxed to Ranjit Holly   Patient understands he will be contacted by Ranjit Holly

## 2018-10-14 ENCOUNTER — PATIENT MESSAGE (OUTPATIENT)
Dept: UROLOGY | Facility: AMBULATORY SURGERY CENTER | Age: 73
End: 2018-10-14

## 2018-10-14 DIAGNOSIS — N40.1 BPH WITH OBSTRUCTION/LOWER URINARY TRACT SYMPTOMS: ICD-10-CM

## 2018-10-14 DIAGNOSIS — N13.8 BPH WITH OBSTRUCTION/LOWER URINARY TRACT SYMPTOMS: ICD-10-CM

## 2018-10-15 DIAGNOSIS — N13.8 BPH WITH OBSTRUCTION/LOWER URINARY TRACT SYMPTOMS: ICD-10-CM

## 2018-10-15 DIAGNOSIS — N40.1 BPH WITH OBSTRUCTION/LOWER URINARY TRACT SYMPTOMS: ICD-10-CM

## 2018-10-15 RX ORDER — TAMSULOSIN HYDROCHLORIDE 0.4 MG/1
0.8 CAPSULE ORAL
Qty: 180 CAPSULE | Refills: 2 | Status: SHIPPED | OUTPATIENT
Start: 2018-10-15 | End: 2018-10-15 | Stop reason: SDUPTHER

## 2018-10-15 RX ORDER — TAMSULOSIN HYDROCHLORIDE 0.4 MG/1
0.8 CAPSULE ORAL
Qty: 180 CAPSULE | Refills: 0 | Status: SHIPPED | OUTPATIENT
Start: 2018-10-15 | End: 2019-01-02 | Stop reason: SDUPTHER

## 2018-10-15 NOTE — TELEPHONE ENCOUNTER
From: Mike Avery  To: MOE Carballo  Sent: 10/14/2018 11:37 PM EDT  Subject: Prescription Question    Am now taking 2 capsrules daily of Tamsulocin ( 4 mg each capsule)  Please double amount of capsules needed for my 80 day mail order and update the prescription  Thank you

## 2018-10-25 LAB
ALBUMIN SERPL-MCNC: 4.1 G/DL (ref 3.6–5.1)
ALBUMIN/GLOB SERPL: 1.8 (CALC) (ref 1–2.5)
ALP SERPL-CCNC: 52 U/L (ref 40–115)
ALT SERPL-CCNC: 25 U/L (ref 9–46)
AST SERPL-CCNC: 26 U/L (ref 10–35)
BASOPHILS # BLD AUTO: 32 CELLS/UL (ref 0–200)
BASOPHILS NFR BLD AUTO: 0.6 %
BILIRUB SERPL-MCNC: 0.8 MG/DL (ref 0.2–1.2)
BUN SERPL-MCNC: 20 MG/DL (ref 7–25)
BUN/CREAT SERPL: ABNORMAL (CALC) (ref 6–22)
CALCIUM SERPL-MCNC: 9.1 MG/DL (ref 8.6–10.3)
CHLORIDE SERPL-SCNC: 103 MMOL/L (ref 98–110)
CHOLEST SERPL-MCNC: 135 MG/DL
CHOLEST/HDLC SERPL: 2.3 (CALC)
CO2 SERPL-SCNC: 30 MMOL/L (ref 20–32)
CREAT SERPL-MCNC: 0.99 MG/DL (ref 0.7–1.18)
EOSINOPHIL # BLD AUTO: 329 CELLS/UL (ref 15–500)
EOSINOPHIL NFR BLD AUTO: 6.1 %
ERYTHROCYTE [DISTWIDTH] IN BLOOD BY AUTOMATED COUNT: 12.7 % (ref 11–15)
GLOBULIN SER CALC-MCNC: 2.3 G/DL (CALC) (ref 1.9–3.7)
GLUCOSE SERPL-MCNC: 101 MG/DL (ref 65–99)
HCT VFR BLD AUTO: 42.2 % (ref 38.5–50)
HDLC SERPL-MCNC: 59 MG/DL
HGB BLD-MCNC: 14.1 G/DL (ref 13.2–17.1)
LDLC SERPL CALC-MCNC: 63 MG/DL (CALC)
LYMPHOCYTES # BLD AUTO: 691 CELLS/UL (ref 850–3900)
LYMPHOCYTES NFR BLD AUTO: 12.8 %
MCH RBC QN AUTO: 29.3 PG (ref 27–33)
MCHC RBC AUTO-ENTMCNC: 33.4 G/DL (ref 32–36)
MCV RBC AUTO: 87.7 FL (ref 80–100)
MONOCYTES # BLD AUTO: 410 CELLS/UL (ref 200–950)
MONOCYTES NFR BLD AUTO: 7.6 %
NEUTROPHILS # BLD AUTO: 3937 CELLS/UL (ref 1500–7800)
NEUTROPHILS NFR BLD AUTO: 72.9 %
NONHDLC SERPL-MCNC: 76 MG/DL (CALC)
PLATELET # BLD AUTO: 187 THOUSAND/UL (ref 140–400)
PMV BLD REES-ECKER: 10 FL (ref 7.5–12.5)
POTASSIUM SERPL-SCNC: 4.2 MMOL/L (ref 3.5–5.3)
PROT SERPL-MCNC: 6.4 G/DL (ref 6.1–8.1)
PSA SERPL-MCNC: 0.3 NG/ML
RBC # BLD AUTO: 4.81 MILLION/UL (ref 4.2–5.8)
SL AMB EGFR AFRICAN AMERICAN: 87 ML/MIN/1.73M2
SL AMB EGFR NON AFRICAN AMERICAN: 75 ML/MIN/1.73M2
SODIUM SERPL-SCNC: 140 MMOL/L (ref 135–146)
TRIGL SERPL-MCNC: 44 MG/DL
WBC # BLD AUTO: 5.4 THOUSAND/UL (ref 3.8–10.8)

## 2018-10-26 ENCOUNTER — OFFICE VISIT (OUTPATIENT)
Dept: FAMILY MEDICINE CLINIC | Facility: CLINIC | Age: 73
End: 2018-10-26
Payer: MEDICARE

## 2018-10-26 VITALS
TEMPERATURE: 96.4 F | SYSTOLIC BLOOD PRESSURE: 140 MMHG | DIASTOLIC BLOOD PRESSURE: 64 MMHG | BODY MASS INDEX: 21.05 KG/M2 | WEIGHT: 146.7 LBS

## 2018-10-26 DIAGNOSIS — L30.9 DERMATITIS: Primary | ICD-10-CM

## 2018-10-26 DIAGNOSIS — Z23 IMMUNIZATION DUE: ICD-10-CM

## 2018-10-26 PROCEDURE — G0008 ADMIN INFLUENZA VIRUS VAC: HCPCS | Performed by: FAMILY MEDICINE

## 2018-10-26 PROCEDURE — 99213 OFFICE O/P EST LOW 20 MIN: CPT | Performed by: FAMILY MEDICINE

## 2018-10-26 PROCEDURE — 90662 IIV NO PRSV INCREASED AG IM: CPT | Performed by: FAMILY MEDICINE

## 2018-10-26 RX ORDER — TRIAMCINOLONE ACETONIDE 1 MG/G
CREAM TOPICAL 2 TIMES DAILY
Qty: 30 G | Refills: 0 | Status: SHIPPED | OUTPATIENT
Start: 2018-10-26 | End: 2019-04-30

## 2018-10-26 RX ORDER — PREDNISONE 20 MG/1
40 TABLET ORAL DAILY
Qty: 10 TABLET | Refills: 0 | Status: SHIPPED | OUTPATIENT
Start: 2018-10-26 | End: 2018-10-31

## 2018-10-26 RX ORDER — DIGOXIN 125 MCG
125 TABLET ORAL DAILY
COMMUNITY
Start: 2018-10-09 | End: 2020-12-08

## 2018-10-26 NOTE — PROGRESS NOTES
Assessment/Plan:    No problem-specific Assessment & Plan notes found for this encounter  Diagnoses and all orders for this visit:    Dermatitis  -     triamcinolone (KENALOG) 0 1 % cream; Apply topically 2 (two) times a day  -     predniSONE 20 mg tablet; Take 2 tablets (40 mg total) by mouth daily for 5 days    Other orders  -     digoxin (LANOXIN) 0 125 mg tablet;           Subjective:      Patient ID: Neville Marshall is a 74 y o  male  Cordcecilia Galloco comes with a itchy rash on his face behind the ear and also on hands he thinks it might be poison ivy as he was in the garden 4 days ago  The following portions of the patient's history were reviewed and updated as appropriate: allergies, current medications, past family history, past medical history, past social history, past surgical history and problem list     Review of Systems   Skin: Positive for rash  Objective:  Vitals:    10/26/18 1042   BP: 140/64   BP Location: Left arm   Patient Position: Sitting   Cuff Size: Adult   Temp: (!) 96 4 °F (35 8 °C)   Weight: 66 5 kg (146 lb 11 2 oz)      Physical Exam   Skin: Rash noted  There is erythema

## 2018-10-31 NOTE — PROGRESS NOTES
11/1/2018    Jerilyn Gillis  1945  [id number]    Discussion and Plan    Patient continues to do well with no evidence of recurrent prostate cancer  Urinary pattern has gradually improved as well  He may attempt utilizing 0 4 mg daily  Patient will otherwise return in 6 months for ultrasound postvoid check with PSA prior to visit  1  Adenocarcinoma of [address] ([facility] [address] )  - PSA Total, Diagnostic; Future    2  BPH with obstruction/lower urinary tract symptoms  - POCT Measure PVR; Future    Assessment      Patient Active Problem List   Diagnosis    Adenocarcinoma of prostate ([facility] [address] )    BPH with obstruction/lower urinary tract symptoms    Elevated PSA    Chronic coronary artery disease    Environmental and seasonal allergies    Hyperlipidemia    Nuclear sclerosis of right eye    Osteoporosis    Lung field abnormal    Primary localized osteoarthritis of knees, bilateral    Vitamin D deficiency    Preprocedural general physical examination    Dermatitis       History of Present Illness    Edda iGl is a 74 y o  male seen today in regards to a history of [place] 7 prostate cancer status post radiation therapy completed 10/2016 and lower urinary tract symptoms  He presents today doing well  He is currently taking 2 pills of Flomax  Of note his PSA trend since XRT has gone from 3 5-1 6-2 0-1 4-0 4-0 3  Performs CIC as needed the however this has not been required for quite some time  Denies interval urinary complaints      Urinary Symptom Assessment        Past Medical History  Past Medical History:   Diagnosis Date    Diverticulitis w/o hemorrhage 2008    Dyspepsia 2005    Nephrolithiasis     PVC (premature ventricular contraction) 03/30/2012       Past Social History  Past Surgical History:   Procedure Laterality Date    CATARACT EXTRACTION Right 06/2018    CATARACT EXTRACTION Left 08/30/2018    COLONOSCOPY W/ POLYPECTOMY  04/04/2006    PROSTATE BIOPSY  06/03/2016    Prostate Needle Biopsy     SHOULDER SURGERY Left     Arthrocentesis of the shoulder sub-=acronial bursa area    UMBILICAL HERNIA REPAIR      UPPER GASTROINTESTINAL ENDOSCOPY         Past Family History  Family History   Problem Relation Age of Onset    Hypertension Mother     Stroke Mother     Diabetes Mother     Gout Father     Alzheimer's disease Father     Diabetes Family         Mellitus       Past Social history  Social History     Social History    Marital status:      Spouse name: N/A    Number of children: N/A    Years of education: N/A     Occupational History    Not on file  Social History Main Topics    Smoking status: Never Smoker    Smokeless tobacco: Never Used    Alcohol use No    Drug use: No    Sexual activity: No     Other Topics Concern    Not on file     Social History Narrative    No narrative on file       Current Medications  Current Outpatient Prescriptions   Medication Sig Dispense Refill    aspirin 81 MG tablet Take 1 tablet by mouth daily      atorvastatin (LIPITOR) 10 mg tablet Take 10 mg by mouth daily        atropine (ISOPTO ATROPINE) 1 % ophthalmic solution Apply 1 drop to eye      Cholecalciferol (VITAMIN D3) 2000 units capsule Take 1 capsule by mouth daily      Difluprednate 0 05 % EMUL Apply 1 drop to eye      digoxin (LANOXIN) 0 125 mg tablet       metoprolol succinate (TOPROL-XL) 25 mg 24 hr tablet 1/2 tab daily       Nepafenac 0 3 % SUSP Apply 1 drop to eye      tamsulosin (FLOMAX) 0 4 mg Take 2 capsules (0 8 mg total) by mouth daily with dinner 180 capsule 0    triamcinolone (KENALOG) 0 1 % cream Apply topically 2 (two) times a day (Patient not taking: Reported on 11/1/2018 ) 30 g 0     No current facility-administered medications for this visit  Allergies  No Known Allergies    Past Medical History, Social History, Family History, medications and allergies were reviewed  Review of Systems  Review of Systems   Constitutional: Negative  HENT: Negative  Eyes: Negative  Respiratory: Negative  Cardiovascular: Negative  Gastrointestinal: Negative  Endocrine: Negative  Genitourinary: Negative for decreased urine volume, difficulty urinating, hematuria and urgency  Musculoskeletal: Negative  Skin: Negative  Neurological: Negative  Hematological: Negative  Psychiatric/Behavioral: Negative  Vitals  Vitals:    11/01/18 0831   BP: 120/60   BP Location: Left arm   Patient Position: Sitting   Cuff Size: Standard   Pulse: 66   Weight: 66 2 kg (146 lb)   Height: 5' 10" (1 778 m)         Physical Exam    Physical Exam   Constitutional: He is oriented to person, place, and time  He appears well-developed and well-nourished  HENT:   Head: Normocephalic and atraumatic  Eyes: Pupils are equal, round, and reactive to light  Neck: Normal range of motion  Cardiovascular: Normal rate, regular rhythm and normal heart sounds  Pulmonary/Chest: Effort normal and breath sounds normal  No accessory muscle usage  No respiratory distress  Abdominal: Soft  Normal appearance and bowel sounds are normal  There is no tenderness  Genitourinary: Rectum normal, prostate normal and penis normal  No penile tenderness  Genitourinary Comments: Prostate 20 g and smooth   Musculoskeletal: Normal range of motion  Neurological: He is alert and oriented to person, place, and time  Skin: Skin is warm, dry and intact  Psychiatric: He has a normal mood and affect  His speech is normal  Cognition and memory are normal    Nursing note and vitals reviewed        Results    Below listed labs, pathology results, and radiology images were personally reviewed:    Lab Results   Component Value Date/Time    PSA 0 3 10/24/2018 09:42 AM     Lab Results   Component Value Date    CALCIUM 9 1 10/24/2018     06/18/2016    K 4 2 10/24/2018    CO2 30 10/24/2018     10/24/2018    BUN 20 10/24/2018    CREATININE 0 99 06/18/2016     Lab Results   Component Value Date    WBC 5 4 10/24/2018    HGB 14 1 10/24/2018    HCT 42 2 10/24/2018    MCV 87 7 10/24/2018     10/24/2018       No results found for this or any previous visit (from the past 1 hour(s)) ]

## 2018-11-01 ENCOUNTER — OFFICE VISIT (OUTPATIENT)
Dept: UROLOGY | Facility: AMBULATORY SURGERY CENTER | Age: 73
End: 2018-11-01
Payer: MEDICARE

## 2018-11-01 VITALS
SYSTOLIC BLOOD PRESSURE: 120 MMHG | BODY MASS INDEX: 20.9 KG/M2 | DIASTOLIC BLOOD PRESSURE: 60 MMHG | HEIGHT: 70 IN | HEART RATE: 66 BPM | WEIGHT: 146 LBS

## 2018-11-01 DIAGNOSIS — N13.8 BPH WITH OBSTRUCTION/LOWER URINARY TRACT SYMPTOMS: ICD-10-CM

## 2018-11-01 DIAGNOSIS — N40.1 BPH WITH OBSTRUCTION/LOWER URINARY TRACT SYMPTOMS: ICD-10-CM

## 2018-11-01 DIAGNOSIS — C61 ADENOCARCINOMA OF PROSTATE (HCC): Primary | ICD-10-CM

## 2018-11-01 PROCEDURE — 99214 OFFICE O/P EST MOD 30 MIN: CPT | Performed by: UROLOGY

## 2018-11-05 ENCOUNTER — OFFICE VISIT (OUTPATIENT)
Dept: FAMILY MEDICINE CLINIC | Facility: CLINIC | Age: 73
End: 2018-11-05
Payer: MEDICARE

## 2018-11-05 ENCOUNTER — TELEPHONE (OUTPATIENT)
Dept: FAMILY MEDICINE CLINIC | Facility: CLINIC | Age: 73
End: 2018-11-05

## 2018-11-05 VITALS
BODY MASS INDEX: 20.81 KG/M2 | TEMPERATURE: 96.5 F | DIASTOLIC BLOOD PRESSURE: 58 MMHG | WEIGHT: 145 LBS | OXYGEN SATURATION: 98 % | SYSTOLIC BLOOD PRESSURE: 102 MMHG | HEART RATE: 79 BPM

## 2018-11-05 DIAGNOSIS — C61 ADENOCARCINOMA OF PROSTATE (HCC): ICD-10-CM

## 2018-11-05 DIAGNOSIS — I25.10 CHRONIC CORONARY ARTERY DISEASE: Primary | ICD-10-CM

## 2018-11-05 DIAGNOSIS — H61.23 BILATERAL IMPACTED CERUMEN: ICD-10-CM

## 2018-11-05 DIAGNOSIS — E78.5 HYPERLIPIDEMIA, UNSPECIFIED HYPERLIPIDEMIA TYPE: ICD-10-CM

## 2018-11-05 PROCEDURE — G0439 PPPS, SUBSEQ VISIT: HCPCS | Performed by: FAMILY MEDICINE

## 2018-11-05 PROCEDURE — 99214 OFFICE O/P EST MOD 30 MIN: CPT | Performed by: FAMILY MEDICINE

## 2018-11-05 NOTE — PATIENT INSTRUCTIONS
WE RECOMMEND GETTING THE 2- DOSE SHINGLES VACCINE ([address]) FROM YOUR PHARMACY  CHECK WITH THEM ON THE COST  YOU SHOULD HAVE THIS IF OVER AGE 48, EVEN IF YOU HAVE HAD THE ORIGINAL VACCINE (ZOSTRIX)

## 2018-11-05 NOTE — TELEPHONE ENCOUNTER
Pt is not sure when he should schedule his next appt 6 months or 1 year  Nothing stated in checkout

## 2018-11-05 NOTE — PROGRESS NOTES
Assessment/Plan:    No problem-specific Assessment & Plan notes found for this encounter  Diagnoses and all orders for this visit:    Chronic coronary artery disease    Adenocarcinoma of prostate ([address] )    Hyperlipidemia, unspecified hyperlipidemia type    Other orders  -     Ear cerumen removal          Subjective:      Patient ID: Laine Mullen is a 74 y o  male  PATIENT RETURNS FOR FOLLOW-UP OF CHRONIC MEDICAL CONDITIONS  NO HOSPITAL STAYS OR EMERGENCY VISITS RECENTLY  MEDS WERE REVIEWED AND NO SIDE EFFECTS  NO NEW ISSUES  UNLESS NOTED BELOW  CHRONIC MEDICAL ISSUES WERE STABLE TODAY  NO MED CHANGES WERE MADE  SURVEILLANCE LABS IF INDICATED WERE DONE  Medicare wellness exam     Ears blocked         The following portions of the patient's history were reviewed and updated as appropriate: allergies, current medications, past family history, past medical history, past social history, past surgical history and problem list     Review of Systems   Constitutional: Negative for activity change and appetite change  HENT: Negative for trouble swallowing  Eyes: Negative for visual disturbance  Respiratory: Negative for cough and shortness of breath  Cardiovascular: Negative for chest pain, palpitations and leg swelling  Gastrointestinal: Negative for abdominal pain and blood in stool  Endocrine: Negative for polyuria  Genitourinary: Negative for difficulty urinating and hematuria  Skin: Negative for rash  Neurological: Negative for dizziness  Psychiatric/Behavioral: Negative for behavioral problems  Objective:  Vitals:    11/05/18 0902   BP: 102/58   BP Location: Left arm   Patient Position: Sitting   Cuff Size: Adult   Pulse: 79   Temp: (!) 96 5 °F (35 8 °C)   SpO2: 98%   Weight: 65 8 kg (145 lb)      Physical Exam   Constitutional: He appears well-developed and well-nourished     HENT:   Head: Normocephalic and atraumatic    bilat : cerumen    Eyes: Conjunctivae are normal    Neck: Neck supple  No thyromegaly present  Cardiovascular: Normal rate, regular rhythm, normal heart sounds and intact distal pulses  No murmur heard  Pulmonary/Chest: Effort normal and breath sounds normal  No respiratory distress  Musculoskeletal: He exhibits no edema  Lymphadenopathy:     He has no cervical adenopathy  Skin: Skin is warm and dry  Psychiatric: He has a normal mood and affect  His behavior is normal        Assessment and Plan:    Problem List Items Addressed This Visit     Adenocarcinoma of prostate ([facility] )    Chronic coronary artery disease - Primary    Hyperlipidemia        Health Maintenance Due   Topic Date Due    CRC Screening: Colonoscopy  1945    DTaP,Tdap,and Td Vaccines (1 - Tdap) 10/26/1964         HPI:  Elizabeth Warner is a 74 y o  male here for his Subsequent Wellness Visit      Patient Active Problem List   Diagnosis    Adenocarcinoma of prostate ([facility] )    BPH with obstruction/lower urinary tract symptoms    Elevated PSA    Chronic coronary artery disease    Environmental and seasonal allergies    Hyperlipidemia    Nuclear sclerosis of right eye    Osteoporosis    Lung field abnormal    Primary localized osteoarthritis of knees, bilateral    Vitamin D deficiency    Preprocedural general physical examination    Dermatitis     Past Medical History:   Diagnosis Date    Diverticulitis w/o hemorrhage 2008    Dyspepsia 2005    Nephrolithiasis     PVC (premature ventricular contraction) 03/30/2012     Past Surgical History:   Procedure Laterality Date    CATARACT EXTRACTION Right 06/2018    CATARACT EXTRACTION Left 08/30/2018    COLONOSCOPY W/ POLYPECTOMY  04/04/2006    PROSTATE BIOPSY  06/03/2016    Prostate Needle Biopsy     SHOULDER SURGERY Left     Arthrocentesis of the shoulder sub-=acronial bursa area    UMBILICAL HERNIA REPAIR      UPPER GASTROINTESTINAL ENDOSCOPY       Family History   Problem Relation Age of Onset    Hypertension Mother     Stroke Mother     Diabetes Mother     Gout Father     Alzheimer's disease Father     Diabetes Family         Mellitus     History   Smoking Status    Never Smoker   Smokeless Tobacco    Never Used     History   Alcohol Use No      History   Drug Use No       Current Outpatient Prescriptions   Medication Sig Dispense Refill    aspirin 81 MG tablet Take 1 tablet by mouth daily      atorvastatin (LIPITOR) 10 mg tablet Take 10 mg by mouth daily        atropine (ISOPTO ATROPINE) 1 % ophthalmic solution Apply 1 drop to eye      Cholecalciferol (VITAMIN D3) 2000 units capsule Take 1 capsule by mouth daily      Difluprednate 0 05 % EMUL Apply 1 drop to eye      digoxin (LANOXIN) 0 125 mg tablet       metoprolol succinate (TOPROL-XL) 25 mg 24 hr tablet 1/2 tab daily       Nepafenac 0 3 % SUSP Apply 1 drop to eye      tamsulosin (FLOMAX) 0 4 mg Take 2 capsules (0 8 mg total) by mouth daily with dinner 180 capsule 0    triamcinolone (KENALOG) 0 1 % cream Apply topically 2 (two) times a day (Patient not taking: Reported on 11/1/2018 ) 30 g 0     No current facility-administered medications for this visit  No Known Allergies  Immunization History   Administered Date(s) Administered    Influenza 12/24/2015, 12/24/2015, 02/16/2018, 10/26/2018    Influenza Split 12/26/2012    Influenza Split High Dose Preservative Free IM 02/16/2018    Influenza, high dose seasonal 0 5 mL 10/26/2018    Pneumococcal Conjugate 13-Valent 05/29/2015    Pneumococcal Polysaccharide PPV23 11/13/2008    Tuberculin Skin Test-PPD Intradermal 07/13/2007    Zoster 10/27/2014       Patient Care Team:  Wendy Carl MD as PCP - General  MD Sy Mcgill MD Evon Boys, MD Donel Dibbles, MD      Medicare Screening Tests and Risk Assessments:  Last Medicare Wellness visit information reviewed, patient interviewed, no change since last AWV  Broken Bones/Falls:     Fall Risk Assessment:    In the past year, patient has experienced: visual disturbance    Preventative Screening/Counseling:      Cardiovascular:      General: Screening Current      Comments: Sees cardio reg         Diabetes:      General: Screening Current          Colorectal Cancer:      General: Screening Current          Prostate Cancer:      General: Screening Current          Osteoporosis:      General: Screening Current          AAA:      General: Screening Not Indicated          Glaucoma:      General: Screening Current          HIV:      General: Screening Not Indicated          Hepatitis C:      General: Screening Not Indicated        Advanced Directives:   Patient has living will for healthcare, has durable POA for healthcare, patient has an advanced directive       Immunizations:  Patient reviewed and up to date      Zostavax: Risks & Benefits Discussed            Ear cerumen removal  Date/Time: 11/5/2018 9:24 AM  Performed by: Yennifer Zapata by: Abeba Tuttle     Patient location:  Clinic  Other Assisting Provider: No    Consent:     Consent obtained:  Verbal    Consent given by:  Patient  Procedure details:     Local anesthetic:  None    Location:  R ear and L ear  Post-procedure details:     Complication:  None    Hearing quality:  Improved

## 2018-12-26 ENCOUNTER — TELEPHONE (OUTPATIENT)
Dept: UROLOGY | Facility: MEDICAL CENTER | Age: 73
End: 2018-12-26

## 2018-12-26 DIAGNOSIS — N13.8 BPH WITH OBSTRUCTION/LOWER URINARY TRACT SYMPTOMS: ICD-10-CM

## 2018-12-26 DIAGNOSIS — N40.1 BPH WITH OBSTRUCTION/LOWER URINARY TRACT SYMPTOMS: ICD-10-CM

## 2018-12-26 NOTE — TELEPHONE ENCOUNTER
Called and spoke with patient  He has plenty of pills  Will send refill of tamsulosin through to [facility] after 1/1/19  Pharmacy info updated

## 2018-12-26 NOTE — TELEPHONE ENCOUNTER
Pt calling with mail away pharmacy change as of 01/01/19 from [facility] to [facility] and will new script sent for Flomax 0 4mg #90

## 2019-01-02 RX ORDER — TAMSULOSIN HYDROCHLORIDE 0.4 MG/1
0.4 CAPSULE ORAL
Qty: 90 CAPSULE | Refills: 2 | Status: SHIPPED | OUTPATIENT
Start: 2019-01-02 | End: 2019-01-03 | Stop reason: SDUPTHER

## 2019-01-02 NOTE — TELEPHONE ENCOUNTER
Will send to Addy Leos to place appropriate refill to new mail-order pharmacy listed in [address] [place] [address] Rd

## 2019-01-03 ENCOUNTER — TELEPHONE (OUTPATIENT)
Dept: UROLOGY | Facility: AMBULATORY SURGERY CENTER | Age: 74
End: 2019-01-03

## 2019-01-03 DIAGNOSIS — N13.8 BPH WITH OBSTRUCTION/LOWER URINARY TRACT SYMPTOMS: ICD-10-CM

## 2019-01-03 DIAGNOSIS — N40.1 BPH WITH OBSTRUCTION/LOWER URINARY TRACT SYMPTOMS: ICD-10-CM

## 2019-01-03 RX ORDER — TAMSULOSIN HYDROCHLORIDE 0.4 MG/1
0.8 CAPSULE ORAL
Qty: 180 CAPSULE | Refills: 3 | Status: SHIPPED | OUTPATIENT
Start: 2019-01-03 | End: 2020-06-17 | Stop reason: SDUPTHER

## 2019-01-03 NOTE — TELEPHONE ENCOUNTER
----- Message from Sabrina Meigs sent at 1/3/2019  1:34 PM EST -----  Regarding: Prescription Question  Contact: [phone]  I need you to contact my new Mail order prescription service, [facility], to specify that the prescription is stated for "2"  4mg capsules per day, instead of one capsule per day, which is what they have it as presently    Please confirm having done this, by a phone call to my cell phone at [phone]

## 2019-01-03 NOTE — TELEPHONE ENCOUNTER
Patient is calling for medication Tamsulosin  Requesting a 90 day supply  He has questions for the nurse  Would like a call back today if possible  Does have new prescription coverage

## 2019-01-08 NOTE — TELEPHONE ENCOUNTER
Spoke with Brandie Vizcarra @ [facility] and clarified the order for tamsulosin is for 2 capsules daily  She confirmed order and will send out to patient

## 2019-01-08 NOTE — TELEPHONE ENCOUNTER
Per last office note, patient had reporting only taking 1 capsule flomax HS, he decreased dosage in September 2018  However, if patient is requesting to use 2 pills of Flomax, please assist with contacting pharmacy to confirm patient's prescription of tamsulosin 0 4mg, 2 capsules HS

## 2019-01-08 NOTE — TELEPHONE ENCOUNTER
Jaylen Florentino from [address] [place] [address] would like a call back on her direct line  Needs clarification on dosage change  Please call back

## 2019-01-31 ENCOUNTER — TELEPHONE (OUTPATIENT)
Dept: UROLOGY | Facility: AMBULATORY SURGERY CENTER | Age: 74
End: 2019-01-31

## 2019-01-31 NOTE — TELEPHONE ENCOUNTER
----- Message from Marcia Ramos sent at 1/31/2019  2:26 PM EST -----  Regarding: Non-Urgent Medical Question  Contact: [phone]  About 4 weeks ago my stream came slowly  Yesterday it stopped altogether and I had to use the catheter  In the past, when it stopped and I used a catheter, it started flowing normally again  It flowed normal for a couple of months at that time  Yesterday, it stopped flowing and I had to use the catheter three times, and this morning I had to use it twice also  After the second time this morning I got a slow steady flow and so far it has continued  I think that I should come in very soon for a visit  What do you think?

## 2019-01-31 NOTE — TELEPHONE ENCOUNTER
Dayanara Mercado and was able to get him in tomorrow with Cammie Carl at the [facility] office

## 2019-02-01 ENCOUNTER — OFFICE VISIT (OUTPATIENT)
Dept: UROLOGY | Facility: CLINIC | Age: 74
End: 2019-02-01
Payer: MEDICARE

## 2019-02-01 VITALS
DIASTOLIC BLOOD PRESSURE: 74 MMHG | BODY MASS INDEX: 21.05 KG/M2 | HEIGHT: 70 IN | SYSTOLIC BLOOD PRESSURE: 118 MMHG | HEART RATE: 72 BPM | WEIGHT: 147 LBS

## 2019-02-01 DIAGNOSIS — C61 ADENOCARCINOMA OF PROSTATE (HCC): Primary | ICD-10-CM

## 2019-02-01 PROCEDURE — 99213 OFFICE O/P EST LOW 20 MIN: CPT | Performed by: PHYSICIAN ASSISTANT

## 2019-02-01 NOTE — PROGRESS NOTES
2/1/2019      Chief Complaint   Patient presents with    Benign Prostatic Hypertrophy    Prostate Cancer       Assessment and Plan    74 y o  male managed by Dr Kieran Blair    1  [place] 7 (3+4) prostate cancer s/p XRT (10/2016)  - PSA 0 3 (10/24/18)  - due again 5/2019     2  LUTs  - CIC as needed  - continue flomax 0 4mg nightly  - schedule cystoscopy given difficulty catheterizing        History of Present Illness  Slava Damon is a 74 y o  male with a history of [place] 7 (3+4) prostate cancer s/p XRT (10/2016) here for evaluation of difficulty catheterizing  Difficult to determine with the patient's main complaint is when asked  Per his e-mail it appears as though he is catheterizing more frequently given difficulty urinating  He also has some difficulty advancing the catheter all the way into the bladder  He is able to advance it successfully with return of urine  Review of Systems   Constitutional: Negative for activity change, chills and fever  Gastrointestinal: Negative for abdominal distention and abdominal pain  Musculoskeletal: Negative for back pain and gait problem  Psychiatric/Behavioral: Negative for behavioral problems and confusion  Urinary Incontinence Screening      Most Recent Value   Urinary Incontinence   Urinary Incontinence? No   Incomplete emptying? No   Urinary frequency? Yes   Urinary urgency? Yes   Urinary hesitancy? Yes   Dysuria (painful difficult urination)? No   Straining (having to push to go)? Yes   Weak stream?  Yes [3 weeks prior to issues]   Intermittent stream?  No   Post void dribbling?   Yes          Past Medical History  Past Medical History:   Diagnosis Date    Diverticulitis w/o hemorrhage 2008    Dyspepsia 2005    Nephrolithiasis     PVC (premature ventricular contraction) 03/30/2012       Past Social History  Past Surgical History:   Procedure Laterality Date    CATARACT EXTRACTION Right 06/2018    CATARACT EXTRACTION Left 08/30/2018    COLONOSCOPY W/ POLYPECTOMY  04/04/2006    PROSTATE BIOPSY  06/03/2016    Prostate Needle Biopsy     SHOULDER SURGERY Left     Arthrocentesis of the shoulder sub-=acronial bursa area    UMBILICAL HERNIA REPAIR      UPPER GASTROINTESTINAL ENDOSCOPY       History   Smoking Status    Never Smoker   Smokeless Tobacco    Never Used       Past Family History  Family History   Problem Relation Age of Onset    Hypertension Mother     Stroke Mother     Diabetes Mother     Gout Father     Alzheimer's disease Father     Diabetes Family         Mellitus       Past Social history  Social History     Social History    Marital status:      Spouse name: N/A    Number of children: N/A    Years of education: N/A     Occupational History    Not on file  Social History Main Topics    Smoking status: Never Smoker    Smokeless tobacco: Never Used    Alcohol use No    Drug use: No    Sexual activity: No     Other Topics Concern    Not on file     Social History Narrative    No narrative on file       Current Medications  Current Outpatient Prescriptions   Medication Sig Dispense Refill    aspirin 81 MG tablet Take 1 tablet by mouth daily      atorvastatin (LIPITOR) 10 mg tablet Take 10 mg by mouth daily        Cholecalciferol (VITAMIN D3) 2000 units capsule Take 1 capsule by mouth daily      digoxin (LANOXIN) 0 125 mg tablet Take 125 mcg by mouth daily        metoprolol succinate (TOPROL-XL) 25 mg 24 hr tablet 1/2 tab daily       tamsulosin (FLOMAX) 0 4 mg Take 2 capsules (0 8 mg total) by mouth daily with dinner 180 capsule 3    atropine (ISOPTO ATROPINE) 1 % ophthalmic solution Apply 1 drop to eye      Difluprednate 0 05 % EMUL Apply 1 drop to eye      Nepafenac 0 3 % SUSP Apply 1 drop to eye      triamcinolone (KENALOG) 0 1 % cream Apply topically 2 (two) times a day (Patient not taking: Reported on 11/1/2018 ) 30 g 0     No current facility-administered medications for this visit  Allergies  No Known Allergies      The following portions of the patient's history were reviewed and updated as appropriate: allergies, current medications, past medical history, past social history, past surgical history and problem list       Vitals  Vitals:    02/01/19 1049   BP: 118/74   BP Location: Left arm   Patient Position: Sitting   Cuff Size: Adult   Pulse: 72   Weight: 66 7 kg (147 lb)   Height: 5' 10" (1 778 m)       Physical Exam  Constitutional   General appearance: Patient is seated and in no acute distress, well appearing and well nourished  Head and Face   Head and face: Normal     Eyes   Conjunctiva and lids: No erythema, swelling or discharge  Ears, Nose, Mouth, and Throat   Hearing: Normal     Pulmonary   Respiratory effort: No increased work of breathing or signs of respiratory distress  Cardiovascular   Examination of extremities for edema and/or varicosities: Normal     Abdomen   Abdomen: Non-tender, no masses  Musculoskeletal   Gait and station: Normal     Skin   Skin and subcutaneous tissue: Warm, dry, and intact  No visible lesions or rashes  Psychiatric   Judgment and insight: Normal  Recent and remote memory:  Normal  Mood and affect: Normal      Results  No results found for this or any previous visit (from the past 1 hour(s)) ]  Lab Results   Component Value Date    PSA 0 3 10/24/2018    PSA 0 4 04/17/2018     Lab Results   Component Value Date    CALCIUM 9 1 10/24/2018     06/18/2016    K 4 2 10/24/2018    CO2 30 10/24/2018     10/24/2018    BUN 20 10/24/2018    CREATININE 0 99 06/18/2016     Lab Results   Component Value Date    WBC 5 4 10/24/2018    HGB 14 1 10/24/2018    HCT 42 2 10/24/2018    MCV 87 7 10/24/2018     10/24/2018       Orders  Orders Placed This Encounter   Procedures    Cystourethroscopy     Standing Status:   Future     Standing Expiration Date:   2/1/2020     Scheduling Instructions:       In office

## 2019-02-06 ENCOUNTER — TELEPHONE (OUTPATIENT)
Dept: UROLOGY | Facility: AMBULATORY SURGERY CENTER | Age: 74
End: 2019-02-06

## 2019-02-06 NOTE — TELEPHONE ENCOUNTER
----- Message from Raymundo Snow sent at 2/6/2019 12:03 PM EST -----  Regarding: Non-Urgent Medical Question  Contact: [phone]  I have an appointment on March 7 for a Cystoscopy at [address] , and an appointment  on May 15th to discus  results of a PSA  could both be combined neer the time of the May 15th appointment, at either the [place] [id number]  Location, or [place] location?

## 2019-02-06 NOTE — TELEPHONE ENCOUNTER
I had spoken with patient and he stated that he is urinating better than he has been in a long time but wants to have cystoscopy done in May  I explained that it is difficult to get in for a procedure so call back at the end of February  If he is still voiding okay, we will combine the 2 ov's  If not, at least he has the cysto ov available

## 2019-02-18 ENCOUNTER — TELEPHONE (OUTPATIENT)
Dept: UROLOGY | Facility: MEDICAL CENTER | Age: 74
End: 2019-02-18

## 2019-02-18 NOTE — TELEPHONE ENCOUNTER
Patient called to cancel 03/07/19 cysto with Dr Everardo Ghotra  He does not wish to reschedule at this time

## 2019-04-30 ENCOUNTER — OFFICE VISIT (OUTPATIENT)
Dept: DERMATOLOGY | Facility: CLINIC | Age: 74
End: 2019-04-30
Payer: MEDICARE

## 2019-04-30 VITALS
RESPIRATION RATE: 18 BRPM | TEMPERATURE: 97.8 F | HEART RATE: 101 BPM | WEIGHT: 142.9 LBS | BODY MASS INDEX: 26.98 KG/M2 | HEIGHT: 61 IN

## 2019-04-30 DIAGNOSIS — D22.9 MULTIPLE MELANOCYTIC NEVI: Primary | ICD-10-CM

## 2019-04-30 DIAGNOSIS — L29.9 ITCHING: ICD-10-CM

## 2019-04-30 DIAGNOSIS — L85.3 XEROSIS CUTIS: ICD-10-CM

## 2019-04-30 DIAGNOSIS — L82.1 SEBORRHEIC KERATOSIS: ICD-10-CM

## 2019-04-30 DIAGNOSIS — L57.8 ACTINIC SKIN DAMAGE: ICD-10-CM

## 2019-04-30 PROCEDURE — 99204 OFFICE O/P NEW MOD 45 MIN: CPT | Performed by: DERMATOLOGY

## 2019-05-01 ENCOUNTER — TELEPHONE (OUTPATIENT)
Dept: DERMATOLOGY | Facility: CLINIC | Age: 74
End: 2019-05-01

## 2019-05-06 ENCOUNTER — TELEPHONE (OUTPATIENT)
Dept: UROLOGY | Facility: AMBULATORY SURGERY CENTER | Age: 74
End: 2019-05-06

## 2019-05-07 LAB — PSA SERPL-MCNC: 0.2 NG/ML

## 2019-05-08 PROCEDURE — 52281 CYSTOSCOPY AND TREATMENT: CPT | Performed by: UROLOGY

## 2019-05-08 RX ORDER — OMEPRAZOLE 40 MG/1
1 CAPSULE, DELAYED RELEASE ORAL EVERY 24 HOURS
COMMUNITY
Start: 2018-04-24 | End: 2019-05-13 | Stop reason: ALTCHOICE

## 2019-05-08 RX ORDER — TADALAFIL 5 MG/1
1 TABLET ORAL EVERY 24 HOURS
COMMUNITY
Start: 2015-05-29 | End: 2019-05-13 | Stop reason: ALTCHOICE

## 2019-05-08 RX ORDER — TRIAMCINOLONE ACETONIDE 1 MG/G
CREAM TOPICAL
COMMUNITY
Start: 2015-11-10 | End: 2019-05-13 | Stop reason: ALTCHOICE

## 2019-05-10 ENCOUNTER — PROCEDURE VISIT (OUTPATIENT)
Dept: UROLOGY | Facility: CLINIC | Age: 74
End: 2019-05-10
Payer: MEDICARE

## 2019-05-10 VITALS
HEIGHT: 70 IN | BODY MASS INDEX: 20.9 KG/M2 | DIASTOLIC BLOOD PRESSURE: 68 MMHG | HEART RATE: 74 BPM | SYSTOLIC BLOOD PRESSURE: 112 MMHG | WEIGHT: 146 LBS

## 2019-05-10 DIAGNOSIS — N13.8 BPH WITH OBSTRUCTION/LOWER URINARY TRACT SYMPTOMS: Primary | ICD-10-CM

## 2019-05-10 DIAGNOSIS — C61 ADENOCARCINOMA OF PROSTATE (HCC): ICD-10-CM

## 2019-05-10 DIAGNOSIS — N40.1 BPH WITH OBSTRUCTION/LOWER URINARY TRACT SYMPTOMS: Primary | ICD-10-CM

## 2019-06-13 ENCOUNTER — TREATMENT (OUTPATIENT)
Dept: FAMILY MEDICINE CLINIC | Facility: CLINIC | Age: 74
End: 2019-06-13

## 2019-06-13 ENCOUNTER — OFFICE VISIT (OUTPATIENT)
Dept: FAMILY MEDICINE CLINIC | Facility: CLINIC | Age: 74
End: 2019-06-13
Payer: MEDICARE

## 2019-06-13 ENCOUNTER — TELEPHONE (OUTPATIENT)
Dept: FAMILY MEDICINE CLINIC | Facility: CLINIC | Age: 74
End: 2019-06-13

## 2019-06-13 VITALS
OXYGEN SATURATION: 98 % | DIASTOLIC BLOOD PRESSURE: 70 MMHG | TEMPERATURE: 98.5 F | HEIGHT: 70 IN | HEART RATE: 76 BPM | WEIGHT: 149.6 LBS | BODY MASS INDEX: 21.42 KG/M2 | SYSTOLIC BLOOD PRESSURE: 110 MMHG

## 2019-06-13 DIAGNOSIS — L29.9 PRURITUS: Primary | ICD-10-CM

## 2019-06-13 PROCEDURE — 99213 OFFICE O/P EST LOW 20 MIN: CPT | Performed by: FAMILY MEDICINE

## 2019-06-13 RX ORDER — DOXYCYCLINE HYCLATE 100 MG/1
100 CAPSULE ORAL EVERY 12 HOURS SCHEDULED
Qty: 20 CAPSULE | Refills: 0 | Status: SHIPPED | OUTPATIENT
Start: 2019-06-13 | End: 2019-06-13

## 2019-06-13 RX ORDER — CEPHALEXIN 500 MG/1
500 CAPSULE ORAL EVERY 8 HOURS SCHEDULED
Qty: 30 CAPSULE | Refills: 0 | Status: SHIPPED | OUTPATIENT
Start: 2019-06-13 | End: 2019-06-20

## 2019-07-02 ENCOUNTER — OFFICE VISIT (OUTPATIENT)
Dept: FAMILY MEDICINE CLINIC | Facility: CLINIC | Age: 74
End: 2019-07-02
Payer: MEDICARE

## 2019-07-02 VITALS
HEIGHT: 70 IN | OXYGEN SATURATION: 98 % | HEART RATE: 55 BPM | SYSTOLIC BLOOD PRESSURE: 110 MMHG | WEIGHT: 145.5 LBS | BODY MASS INDEX: 20.83 KG/M2 | TEMPERATURE: 96.4 F | DIASTOLIC BLOOD PRESSURE: 60 MMHG

## 2019-07-02 DIAGNOSIS — L29.9 PRURITUS: Primary | ICD-10-CM

## 2019-07-02 PROCEDURE — 99213 OFFICE O/P EST LOW 20 MIN: CPT | Performed by: FAMILY MEDICINE

## 2019-07-02 NOTE — PROGRESS NOTES
Assessment/Plan:    No problem-specific Assessment & Plan notes found for this encounter  There are no diagnoses linked to this encounter  Subjective:      Patient ID: Angela Duncan is a 74 y o  male  Pruritus : 7 mos ; exposed to "itching dogs" at that time ; The following portions of the patient's history were reviewed and updated as appropriate: allergies, current medications, past family history, past medical history, past social history, past surgical history and problem list     Review of Systems   Skin: Positive for rash  Objective:  Vitals:    07/02/19 0833   BP: 110/60   BP Location: Left arm   Patient Position: Sitting   Cuff Size: Adult   Pulse: 55   Temp: (!) 96 4 °F (35 8 °C)   TempSrc: Temporal   SpO2: 98%   Weight: 66 kg (145 lb 8 oz)   Height: 5' 10" (1 778 m)      Physical Exam   Skin: Rash noted  There is erythema

## 2019-07-02 NOTE — PATIENT INSTRUCTIONS
Use Zyrtec 10 mg daily : generic OK     Add benadryl 25 mg at bedtime     Call me in 2 wks for progress update   May require derm revisit

## 2019-07-04 LAB
25(OH)D3 SERPL-MCNC: 51 NG/ML (ref 30–100)
ALBUMIN SERPL-MCNC: 4 G/DL (ref 3.6–5.1)
ALBUMIN/GLOB SERPL: 2 (CALC) (ref 1–2.5)
ALP SERPL-CCNC: 50 U/L (ref 40–115)
ALT SERPL-CCNC: 22 U/L (ref 9–46)
AST SERPL-CCNC: 23 U/L (ref 10–35)
BASOPHILS # BLD AUTO: 19 CELLS/UL (ref 0–200)
BASOPHILS NFR BLD AUTO: 0.4 %
BILIRUB SERPL-MCNC: 1 MG/DL (ref 0.2–1.2)
BUN SERPL-MCNC: 17 MG/DL (ref 7–25)
BUN/CREAT SERPL: ABNORMAL (CALC) (ref 6–22)
CALCIUM SERPL-MCNC: 8.8 MG/DL (ref 8.6–10.3)
CHLORIDE SERPL-SCNC: 104 MMOL/L (ref 98–110)
CO2 SERPL-SCNC: 28 MMOL/L (ref 20–32)
CREAT SERPL-MCNC: 1.11 MG/DL (ref 0.7–1.18)
EOSINOPHIL # BLD AUTO: 254 CELLS/UL (ref 15–500)
EOSINOPHIL NFR BLD AUTO: 5.4 %
ERYTHROCYTE [DISTWIDTH] IN BLOOD BY AUTOMATED COUNT: 13 % (ref 11–15)
GLOBULIN SER CALC-MCNC: 2 G/DL (CALC) (ref 1.9–3.7)
GLUCOSE SERPL-MCNC: 87 MG/DL (ref 65–99)
HCT VFR BLD AUTO: 42 % (ref 38.5–50)
HGB BLD-MCNC: 13.7 G/DL (ref 13.2–17.1)
LYMPHOCYTES # BLD AUTO: 818 CELLS/UL (ref 850–3900)
LYMPHOCYTES NFR BLD AUTO: 17.4 %
MCH RBC QN AUTO: 29.1 PG (ref 27–33)
MCHC RBC AUTO-ENTMCNC: 32.6 G/DL (ref 32–36)
MCV RBC AUTO: 89.2 FL (ref 80–100)
MONOCYTES # BLD AUTO: 385 CELLS/UL (ref 200–950)
MONOCYTES NFR BLD AUTO: 8.2 %
NEUTROPHILS # BLD AUTO: 3224 CELLS/UL (ref 1500–7800)
NEUTROPHILS NFR BLD AUTO: 68.6 %
PLATELET # BLD AUTO: 156 THOUSAND/UL (ref 140–400)
PMV BLD REES-ECKER: 9.9 FL (ref 7.5–12.5)
POTASSIUM SERPL-SCNC: 4.1 MMOL/L (ref 3.5–5.3)
PROT SERPL-MCNC: 6 G/DL (ref 6.1–8.1)
RBC # BLD AUTO: 4.71 MILLION/UL (ref 4.2–5.8)
SL AMB EGFR AFRICAN AMERICAN: 75 ML/MIN/1.73M2
SL AMB EGFR NON AFRICAN AMERICAN: 65 ML/MIN/1.73M2
SODIUM SERPL-SCNC: 140 MMOL/L (ref 135–146)
T4 FREE SERPL-MCNC: 0.8 NG/DL (ref 0.8–1.8)
TSH SERPL-ACNC: 6.5 MIU/L (ref 0.4–4.5)
WBC # BLD AUTO: 4.7 THOUSAND/UL (ref 3.8–10.8)

## 2019-07-10 DIAGNOSIS — L29.9 PRURITUS: ICD-10-CM

## 2019-07-10 NOTE — TELEPHONE ENCOUNTER
[facility] received the Rx for the hydrocortisone they will like know if they increased this to twice a day and if they can make this a 90 day supply they need a new RX or verbal with a new grams amount thank you please review

## 2019-07-17 ENCOUNTER — TELEPHONE (OUTPATIENT)
Dept: FAMILY MEDICINE CLINIC | Facility: CLINIC | Age: 74
End: 2019-07-17

## 2019-07-17 NOTE — TELEPHONE ENCOUNTER
Pt called stating he wants his results from his bloodwork he had done  He states he did not hear anything  Pt would like Dr lAton Lino to call him with those  He would like to be called on his cell phone #[phone]

## 2019-07-18 NOTE — TELEPHONE ENCOUNTER
All normal except borderline low thyroid at this time it does not need medications but could in the future repeat TSH with reflex in 3-4 months

## 2019-07-18 NOTE — TELEPHONE ENCOUNTER
Called pt lm to call office back regarding his lab results  Per Jake all labs NORMAL except borderline low thyroid a this time it don't need medications but could in the future   repeat TSH with reflex 3-4 months please advise schedule if pt request

## 2019-07-19 ENCOUNTER — TELEPHONE (OUTPATIENT)
Dept: FAMILY MEDICINE CLINIC | Facility: CLINIC | Age: 74
End: 2019-07-19

## 2019-07-19 ENCOUNTER — TREATMENT (OUTPATIENT)
Dept: FAMILY MEDICINE CLINIC | Facility: CLINIC | Age: 74
End: 2019-07-19

## 2019-07-19 NOTE — TELEPHONE ENCOUNTER
Patient is aware of the results but he did not want to scheduled repeat he will like a personal phone call from dr Sandhya Huber   He has a question   His call back number is [phone] thank you

## 2019-08-01 ENCOUNTER — OFFICE VISIT (OUTPATIENT)
Dept: DERMATOLOGY | Facility: CLINIC | Age: 74
End: 2019-08-01
Payer: MEDICARE

## 2019-08-01 VITALS — BODY MASS INDEX: 20.76 KG/M2 | WEIGHT: 145 LBS | HEIGHT: 70 IN | TEMPERATURE: 97.5 F

## 2019-08-01 DIAGNOSIS — R21 RASH: ICD-10-CM

## 2019-08-01 DIAGNOSIS — L85.3 XEROSIS OF SKIN: Primary | ICD-10-CM

## 2019-08-01 PROCEDURE — 88305 TISSUE EXAM BY PATHOLOGIST: CPT | Performed by: PATHOLOGY

## 2019-08-01 PROCEDURE — 99214 OFFICE O/P EST MOD 30 MIN: CPT | Performed by: DERMATOLOGY

## 2019-08-01 PROCEDURE — 11104 PUNCH BX SKIN SINGLE LESION: CPT | Performed by: DERMATOLOGY

## 2019-08-01 RX ORDER — RANITIDINE 300 MG/1
TABLET ORAL
Qty: 60 TABLET | Refills: 1 | Status: CANCELLED | OUTPATIENT
Start: 2019-08-01

## 2019-08-01 RX ORDER — RANITIDINE 150 MG/1
TABLET ORAL
Qty: 90 TABLET | Refills: 1 | Status: ON HOLD | OUTPATIENT
Start: 2019-08-01 | End: 2020-02-17

## 2019-08-01 NOTE — PATIENT INSTRUCTIONS
1  XEROSIS ("DRY SKIN")    Physical Exam:   Anatomic Location Affected: Full body   Morphological Description:  Mildly diffuse xerosis    Assessment and Plan:  Based on a thorough discussion of this condition and the management approach to it (including a comprehensive discussion of the known risks, side effects and potential benefits of treatment), the patient (family) agrees to implement the following specific plan:   Triamcinolone ointment once or twice a day everywhere   Zyrtec in morning   Ranitidine an hour after dinner    Dry skin refers to skin that feels dry to touch  Dry skin has a dull surface with a rough, scaly quality  The skin is less pliable and cracked  When dryness is severe, the skin may become inflamed and fissured  Although any body site can be dry, dry skin tends to affect the shins more than any other site  Dry skin is lacking moisture in the outer horny cell layer (stratum corneum) and this results in cracks in the skin surface  Dry skin is also called xerosis, xeroderma or asteatosis (lack of fat)  It can affect males and females of all ages  There is some racial variability in water and lipid content of the skin   Dry skin that starts in early childhood may be one of about 20 types of ichthyosis (fish-scale skin)  There is often a family history of dry skin   Dry skin is commonly seen in people with atopic dermatitis   Nearly everyone > 60 years has dry skin  Dry skin that begins later may be seen in people with certain diseases and conditions   Postmenopausal women   Hypothyroidism   Chronic renal disease    Malnutrition and weight loss    Subclinical dermatitis    Treatment with certain drugs such as oral retinoids, diuretics and epidermal growth factor receptor inhibitors    People exposed to a dry environment may experience dry skin     Low humidity: in desert climates or cool, windy conditions    Excessive air conditioning    Direct heat from a fire or fan heater    Excessive bathing    Contact with soap, detergents and solvents    Inappropriate topical agents such as alcohol    Frictional irritation from rough clothing or abrasives    Dry skin is due to abnormalities in the integrity of the barrier function of the stratum corneum, which is made up of corneocytes   There is an overall reduction in the lipids in the stratum corneum   Ratio of ceramides, cholesterol and free fatty acids may be normal or altered   There may be a reduction in the proliferation of keratinocytes   Keratinocyte subtypes change in dry skin with a decrease in keratins K1, K10 and increase in K5, K14     Involucrin (a protein) may be expressed early, increasing cell stiffness   The result is retention of corneocytes and reduced water-holding capacity  The inherited forms of ichthyosis are due to loss of function mutations in various genes (listed in parentheses below)  The clinical features of ichthyosis depend on the specific type of ichthyosis:   Ichthyosis vulgaris (FLG)   Recessive X-linked ichthyosis (STS)    Autosomal recessive congenital ichthyosis (ABCA12, TGM1, ALOXE3)    Keratinopathic ichthyoses (KRT1, KRT10, KRT2)    Acquired ichthyosis may be due to:   Metabolic factors: thyroid deficiency    Illness: lymphoma, internal malignancy, sarcoidosis, HIV infection    Drugs: nicotinic acid, kava, protein kinase inhibitors (eg EGFR inhibitors), hydroxyurea  Complications of dry skin:  Dry areas of skin may become itchy, indicating a form of eczema/dermatitis has developed   Atopic eczema -- especially in people with ichthyosis vulgaris    Eczema craquelé -- especially in elderly people  Also called asteatotic eczema    A dry form of nummular dermatitis/discoid eczema -- especially in people that wash their skin excessively    When the dry skin of an elderly person is itchy without a visible rash, it is sometimes called winter itch, 7th age itch, senile pruritus or chronic pruritus of the elderly  Other complications of dry skin may include:   Skin infection when bacteria or viruses penetrate a break in the skin surface    Overheating, especially in some forms of ichthyosis    Food allergy, eg, to peanuts, has been associated with filaggrin mutations    Contact allergy, eg, to nickel, has also been correlated with barrier function defects  How is the type of dry skin diagnosed? The type of dry skin is diagnosed by careful history and examination  In children:   Family history    Age of onset    Appearance at birth, if known    Distribution of dry skin    Other features, eg eczema, abnormal nails, hair, dentition, sight, hearing  In adults:   Medical history    Medications and topical preparations    Bathing frequency and use of soap    Evaluation of environmental factors that may contribute to dry skin  What is the treatment for dry skin? The mainstay of treatment of dry skin and ichthyosis is moisturisers/emollients  They should be applied liberally and often enough to:   Reduce itch    Improve the barrier function    Prevent entry of irritants, bacteria    Reduce transepidermal water loss  When considering which emollient is most suitable, consider:   Severity of the dryness    Tolerance    Personal preference    Cost and availability  Emollients generally work best if applied to damp skin, if pH is below 7 (acidic), and if containing humectants such as urea or propylene glycol  Additional treatments include:   Topical steroid if itchy or there is dermatitis -- choose an emollient base    Topical calcineurin inhibitors if topical steroids are unsuitable  How can dry skin be prevented? Eliminate aggravating factors:   Reduce the frequency of bathing   A humidifier in winter and air conditioner in summer    Compare having a short shower with a prolonged soak in a bath   Use lukewarm, not hot, water   Replace standard soap with a substitute such as a synthetic detergent cleanser, water-miscible emollient, bath oil, anti-pruritic tar oil, colloidal oatmeal etc     Apply an emollient liberally and often, particularly shortly after bathing, and when itchy  The drier the skin, the thicker this should be, especially on the hands  What is the outlook for dry skin? A tendency to dry skin may persist life-long, or it may improve once contributing factors are controlled  RASH    Physical Exam:   Left lateral buttock    Assessment and Plan:  Based on a thorough discussion of this condition and the management approach to it (including a comprehensive discussion of the known risks, side effects and potential benefits of treatment), the patient (family) agrees to implement the following specific plan:   Punch biopsy          Plan:  1  Instructed to keep the wound dry and covered for 24 and clean thereafter  Apply vaseline a small bandaid daily until sutures are removed  2  Warning signs of infection were reviewed  3  Recommended that the patient use acetaminophen as needed for pain  4  Sutures if any should be removed in 14 days      Standard post-procedure care has been explained and has been included in written form within the patient's copy of Informed Consent

## 2019-08-01 NOTE — PROGRESS NOTES
[address] [facility] Note     Patient Name: Ty Hernandez  Encounter Date: 08/01/2019    Today's Chief Concerns:  [facility] Concern #1: Follow up Rash all over no improvement    Past Medical History:  Have you ever had or currently have any of the following medical conditions or treatments? · HIV/AIDS: No  · Hepatitis B: No  · Hepatitis C: No   · Diabetes: No  · Tuberculosis: No  · Biologic Therapy/Chemotherapy: No  · Organ or Bone Marrow Transplantation: No  · Radiation Treatment: YES  · Cancer (If Yes, which types)- YES, prostate cancer      Have you ever had any of the following skin conditions? · Melanoma? (If Yes, please provide more detail)- No  · Basal Cell Carcinoma: No  · Squamous Cell Carcinoma: No  · Sebaceous Cell Carcinoma: No  · Merkel Cell Carcinoma: No  · Angiosarcoma: No  · Blistering Sunburns: YES  · Eczema: No  · Psoriasis: No    Social History:    What is your current Smoking Status? Non smoker    What is/was your primary occupation? Retired    What are your hobbies/past-times? Family history:  Do any of your "first degree relatives" (parent, brother, sister, or child) have any of the following conditions? · Melanoma? (If Yes, which relatives?) No  · Eczema: No  · Asthma: No  · Hay Fever/Seasonal Allergies: No  · Psoriasis: No  · Arthritis: No  · Thyroid Problems: No  · Lupus/Connective Tissue Disease: No  · Diabetes: No  · Stroke: YES, parents  · Blood Clots: YES, father  · IBD/Crohn's/Ulcerative Colitis: No  · Vitiligo: No  · Scarring/Keloids: No  · Severe Acne: No  · Pancreatic Cancer: No  · Other known Skin Condition? If Yes, what condition and which relatives?   No    Current Medications:    Current Outpatient Medications:     aspirin 81 MG tablet, Take 1 tablet by mouth daily, Disp: , Rfl:     atorvastatin (LIPITOR) 10 mg tablet, Take 10 mg by mouth daily  , Disp: , Rfl:     Cholecalciferol (VITAMIN D3) 2000 units capsule, Take 1 capsule by mouth daily, Disp: , Rfl:    digoxin (LANOXIN) 0 125 mg tablet, Take 125 mcg by mouth daily  , Disp: , Rfl:     hydrocortisone 2 5 % cream, Apply topically 3 (three) times a day, Disp: 120 g, Rfl: 12    tamsulosin (FLOMAX) 0 4 mg, Take 2 capsules (0 8 mg total) by mouth daily with dinner, Disp: 180 capsule, Rfl: 3    hydrocortisone 2 5 % ointment, BEFORE applying your Eucerin cream, apply this medication twice a day fo no more then 10 days straight  (Patient not taking: Reported on 8/1/2019), Disp: 453 6 g, Rfl: 1    metoprolol succinate (TOPROL-XL) 25 mg 24 hr tablet, 1/2 tab daily , Disp: , Rfl:     Specific Alerts:    Are you pregnant or planning to become pregnant? N/A    Are you currently or planning to be nursing or breast feeding? N/A    No Known Allergies    May we call your Preferred Phone number to discuss your specific medical information? YES    May we leave a detailed message that includes your specific medical information? YES    Have you traveled outside of the [facility] in the past 3 months? No    Do you currently have a pacemaker or defibrillator? No    Do you have any artificial heart valves, joints, plates, screws, rods, stents, pins, etc? No   - If Yes, were any placed within the last 2 years? Do you require any medications prior to a surgical procedure? No   - If Yes, for which procedure? - If Yes, what medications to you require? Are you taking any medications that cause you to bleed more easily ("blood thinners") No    Have you ever experienced a rapid heartbeat with epinephrine? No    Have you ever been treated with "gold" (gold sodium thiomalate) therapy? No    [facility] can help with wrinkles, "laugh lines," facial volume loss, "double chin," "love handles," age spots, and more  Are you interested in learning today about some of the skin enhancement procedures that we offer?  (If Yes, please provide more detail) No    Review of Systems:  Have you recently had or currently have any of the following? · Fever or chills: No  · Night Sweats: No  · Headaches: No  · Weight Gain: {No  · Weight Loss: No  · Blurry Vision: No  · Nausea: No  · Vomiting: No  · Diarrhea: No  · Blood in Stool: No  · Abdominal Pain: No  · Itchy Skin: YES  · Painful Joints: No  · Swollen Joints: No  · Muscle Pain: No  · Irregular Mole: No  · Sun Burn: No  · Dry Skin: YES  · Skin Color Changes: No  · Scar or Keloid: No  · Cold Sores/Fever Blisters: YES  · Bacterial Infections/MRSA: No  · Anxiety: No  · Depression: No  · Suicidal or Homicidal Thoughts: No      PHYSICAL EXAM:      Was a chaperone (Derm Clinical Assistant) present for the entirety of the Physical Exam? YES    Did the Dermatology Team specifically ask and  the patient on the importance of a Full Skin Exam to be sure that nothing is missed clinically?  YES    Did the patient request or accept a Full Skin Exam?  YES    Did the patient specifically refuse to have the areas "under-the-underwear" examined by the Dermatologist? No      CONSTITUTIONAL:   Vitals:    08/01/19 1127   Temp: 97 5 °F (36 4 °C)   TempSrc: Tympanic   Weight: 65 8 kg (145 lb)   Height: 5' 10" (1 778 m)       Today's Height:   5' 10"  Today's Weight (in kilograms):   65 8 kg  Today's Temperature:   97 5 F    PSYCH: Normal mood and affect  EYES: Normal conjunctiva  ENT: Normal lips and oral mucosa  CARDIOVASCULAR: No edema  RESPIRATORY: Normal respirations  HEME/LYMPH/IMMUNO:  No regional lymphadenopathy except as noted below in ASSESSMENT AND PLAN BY DIAGNOSIS    FULL ORGAN SYSTEM SKIN EXAM (SKIN)  Hair, Scalp, Ears, Face Normal except as noted below in Assessment   Neck, Cervical Chain Nodes Normal except as noted below in Assessment   Right Arm/Hand/Fingers Normal except as noted below in Assessment   Left Arm/Hand/Fingers Normal except as noted below in Assessment   Chest/Breasts/Axillae Viewed areas Normal except as noted below in Assessment   Abdomen, Umbilicus Normal except as noted below in Assessment   Back/Spine Normal except as noted below in Assessment   Groin/Genitalia/Buttocks Viewed areas Normal except as noted below in Assessment   Right Leg, Foot, Toes Normal except as noted below in Assessment   Left Leg, Foot, Toes Normal except as noted below in Assessment        ASSESSMENT AND PLAN BY DIAGNOSIS:    History of Present Condition:     Duration:  How long has this been an issue for you?    o  8 months   Location Affected:  Where on the body is this affecting you?    o  Full body   Quality:  Is there any bleeding, pain, itch, burning/irritation, or redness associated with the skin lesion?    o  Itching, redness   Severity:  Describe any bleeding, pain, itch, burning/irritation, or redness on a scale of 1 to 10 (with 10 being the worst)  o  10   Timing:  Does this condition seem to be there pretty constantly or do you notice it more at specific times throughout the day?    o  Constantly   Context:  Have you ever noticed that this condition seems to be associated with specific activities you do?    o  Denies   Modifying Factors:    o Anything that seems to make the condition worse?    -  Denies  o What have you tried to do to make the condition better? -  Hydrocoritisone 2 5% cream   Associated Signs and Symptoms:  Does this skin lesion seem to be associated with any of the followin  XEROSIS ("DRY SKIN")    Physical Exam:   Anatomic Location Affected:   Full body   Morphological Description:  Mildly diffuse xerosis   Pertinent Positives:   Pertinent Negatives:No lymphadenopathy    Additional History of Present Condition:      Assessment and Plan:  Based on a thorough discussion of this condition and the management approach to it (including a comprehensive discussion of the known risks, side effects and potential benefits of treatment), the patient (family) agrees to implement the following specific plan:   Triamcinolone ointment   Zyrtec in morning   Ranitidine an hour after dinner    Dry skin refers to skin that feels dry to touch  Dry skin has a dull surface with a rough, scaly quality  The skin is less pliable and cracked  When dryness is severe, the skin may become inflamed and fissured  Although any body site can be dry, dry skin tends to affect the shins more than any other site  Dry skin is lacking moisture in the outer horny cell layer (stratum corneum) and this results in cracks in the skin surface  Dry skin is also called xerosis, xeroderma or asteatosis (lack of fat)  It can affect males and females of all ages  There is some racial variability in water and lipid content of the skin   Dry skin that starts in early childhood may be one of about 20 types of ichthyosis (fish-scale skin)  There is often a family history of dry skin   Dry skin is commonly seen in people with atopic dermatitis   Nearly everyone > 60 years has dry skin  Dry skin that begins later may be seen in people with certain diseases and conditions   Postmenopausal women   Hypothyroidism   Chronic renal disease    Malnutrition and weight loss    Subclinical dermatitis    Treatment with certain drugs such as oral retinoids, diuretics and epidermal growth factor receptor inhibitors    People exposed to a dry environment may experience dry skin   Low humidity: in desert climates or cool, windy conditions    Excessive air conditioning    Direct heat from a fire or fan heater    Excessive bathing    Contact with soap, detergents and solvents    Inappropriate topical agents such as alcohol    Frictional irritation from rough clothing or abrasives    Dry skin is due to abnormalities in the integrity of the barrier function of the stratum corneum, which is made up of corneocytes   There is an overall reduction in the lipids in the stratum corneum   Ratio of ceramides, cholesterol and free fatty acids may be normal or altered      There may be a reduction in the proliferation of keratinocytes   Keratinocyte subtypes change in dry skin with a decrease in keratins K1, K10 and increase in K5, K14     Involucrin (a protein) may be expressed early, increasing cell stiffness   The result is retention of corneocytes and reduced water-holding capacity  The inherited forms of ichthyosis are due to loss of function mutations in various genes (listed in parentheses below)  The clinical features of ichthyosis depend on the specific type of ichthyosis:   Ichthyosis vulgaris (FLG)   Recessive X-linked ichthyosis (STS)    Autosomal recessive congenital ichthyosis (ABCA12, TGM1, ALOXE3)    Keratinopathic ichthyoses (KRT1, KRT10, KRT2)    Acquired ichthyosis may be due to:   Metabolic factors: thyroid deficiency    Illness: lymphoma, internal malignancy, sarcoidosis, HIV infection    Drugs: nicotinic acid, kava, protein kinase inhibitors (eg EGFR inhibitors), hydroxyurea  Complications of dry skin:  Dry areas of skin may become itchy, indicating a form of eczema/dermatitis has developed   Atopic eczema -- especially in people with ichthyosis vulgaris    Eczema craquelé -- especially in elderly people  Also called asteatotic eczema    A dry form of nummular dermatitis/discoid eczema -- especially in people that wash their skin excessively  When the dry skin of an elderly person is itchy without a visible rash, it is sometimes called winter itch, 7th age itch, senile pruritus or chronic pruritus of the elderly  Other complications of dry skin may include:   Skin infection when bacteria or viruses penetrate a break in the skin surface    Overheating, especially in some forms of ichthyosis    Food allergy, eg, to peanuts, has been associated with filaggrin mutations    Contact allergy, eg, to nickel, has also been correlated with barrier function defects  How is the type of dry skin diagnosed?   The type of dry skin is diagnosed by careful history and examination  In children:   Family history    Age of onset    Appearance at birth, if known    Distribution of dry skin    Other features, eg eczema, abnormal nails, hair, dentition, sight, hearing  In adults:   Medical history    Medications and topical preparations    Bathing frequency and use of soap    Evaluation of environmental factors that may contribute to dry skin  What is the treatment for dry skin? The mainstay of treatment of dry skin and ichthyosis is moisturisers/emollients  They should be applied liberally and often enough to:   Reduce itch    Improve the barrier function    Prevent entry of irritants, bacteria    Reduce transepidermal water loss  When considering which emollient is most suitable, consider:   Severity of the dryness    Tolerance    Personal preference    Cost and availability  Emollients generally work best if applied to damp skin, if pH is below 7 (acidic), and if containing humectants such as urea or propylene glycol  Additional treatments include:   Topical steroid if itchy or there is dermatitis -- choose an emollient base    Topical calcineurin inhibitors if topical steroids are unsuitable  How can dry skin be prevented? Eliminate aggravating factors:   Reduce the frequency of bathing   A humidifier in winter and air conditioner in summer    Compare having a short shower with a prolonged soak in a bath   Use lukewarm, not hot, water   Replace standard soap with a substitute such as a synthetic detergent cleanser, water-miscible emollient, bath oil, anti-pruritic tar oil, colloidal oatmeal etc     Apply an emollient liberally and often, particularly shortly after bathing, and when itchy  The drier the skin, the thicker this should be, especially on the hands  What is the outlook for dry skin? A tendency to dry skin may persist life-long, or it may improve once contributing factors are controlled      2  RASH    Physical Exam:   (Anatomic Location); (Size and Morphological Description); (Differential Diagnosis):  o Left lateral buttock; Scaling pink plaques; Allergic contact dermatitis versus eczema craquelae versus mycosis fungoides   Pertinent Positives:   Pertinent Negatives:No lymphadenopathy    Additional History of Present Condition:  Full body itching    Assessment and Plan:  Based on a thorough discussion of this condition and the management approach to it (including a comprehensive discussion of the known risks, side effects and potential benefits of treatment), the patient (family) agrees to implement the following specific plan:   Punch biopsy      PROCEDURE NOTE:  PUNCH BIOPSY    Performing Physician:Dr Lopes     Pre-operative Diagnosis: Allergic contact dermatitis versus eczema craquelae versus mycosis fungoides    Location: Left lateral buttock    Indications: To indicate diagnosis and management plan  Anesthesia: Lidocaine 1% with epinephrine    Procedure Details     Patient informed of the risks (including bleeding,scaring and infection) and benefits of the procedure explained  Verbal and written informed consent obtained  The area was prepped and draped in the usual fashion  Anesthesia was obtained with 1% lidocaine with epinephrine  The skin was then stretched perpendicular to the skin tension lines and a punch biopsy to an appropriate sampling depth was obtained with a 4 mm punch with a forceps and iris scissors  Hemostasis was obtained with 4-0 Prolene x 2 sutures  Complications:  None      Specimen has been sent for review by Dermatopathology  Plan:  1  Instructed to keep the wound dry and covered for 24-48h and clean thereafter  2  Warning signs of infection were reviewed  3  Recommended that the patient use acetaminophen as needed for pain  4   Sutures if any should be removed in 14 days      Standard post-procedure care has been explained and has been included in written form within the patient's copy of Informed Consent          Scribe Attestation    I,:   Barbara Lopez MA am acting as a scribe while in the presence of the attending physician :        I,:   Jonn Jones MD personally performed the services described in this documentation    as scribed in my presence :

## 2019-08-02 ENCOUNTER — TELEPHONE (OUTPATIENT)
Dept: DERMATOLOGY | Facility: CLINIC | Age: 74
End: 2019-08-02

## 2019-08-02 NOTE — TELEPHONE ENCOUNTER
Follow Up Call     Pt saw: Lesly Arias Were you prescribed any medications:Yes     o If YES: did you pick them up at the pharmacy? No, mailed order      Did we do a biopsy? Yes   o If YES: how does the biopsy site look? Per  Pt is healing well no signs of infection      Would you recommend your family and friends to [facility]?  Yes     How satisfied were you with your visit on a scale of 1-10: 10

## 2019-08-07 ENCOUNTER — TELEPHONE (OUTPATIENT)
Dept: DERMATOLOGY | Facility: CLINIC | Age: 74
End: 2019-08-07

## 2019-08-07 NOTE — TELEPHONE ENCOUNTER
Telephone call from patient to see what prescriptions were sent to his pharmacy   Advised patient prescriptions for triamcinolone ointment and zantac were sent to his mail order pharmacy

## 2019-08-09 NOTE — RESULT ENCOUNTER NOTE
DERMATOPATHOLOGY RESULT NOTE    Accession # or Case # (copied from Path Report):  Case: [id number]    Specimen Letter and Anatomic Location (copied from Path Report):  A  Skin, Left lateral buttock, punch biopsy:    Histopathological Diagnosis:    - Acanthosis with mild spongiosis and dermal inflammation with eosinophils, consistent with allergic contact dermatitis; see note      Note: Sections show a mildly acanthotic epidermis with mild spongiosis and overlying parakeratosis  The superficial dermis shows mild lymphocytic inflammation with some eosinophils  Atypical epidermotropic lymphocytes are not seen  In an appropriate clinical setting, the changes can be seen in allergic contact dermatitis  Clinical correlation is recommended  Raji Gan of Lesion/Condition:  BENIGN    Provider has personally called patient and relayed results and plan:  yes    Plan:  I called patient x2 on his cell phone and x2 on his home phone; I left a voicemail on his home phone answering machine  The results of his biopsy confirm my clinical suspicion of an allergic contact dermatitis  This is likely from something he is contacting from his environment  DERM TEAM:  Please call Annabelle Kay back and review with him my desire for him to discontinue using any skin products other than my prescriptions and Eucerin cream to the area            Tissue Exam: [id number]   Order: [id number]   Status:  Final result   Visible to patient:  No (Not Released) Dx:  Rash   Component   Case Report  Surgical Pathology Report                         Case: [id number]                                   Authorizing Provider: Bala Mcgrath MD     Collected:           08/01/2019 1601              Ordering Location:     [facility]      Received:            08/01/2019 1601                                     [facility]                                                                Pathologist:           Antonietta Goldberg, MD                                                                  Specimen:    Skin, Other, Left lateral buttock                                                        Final Diagnosis  A  Skin, Left lateral buttock, punch biopsy:  - Acanthosis with mild spongiosis and dermal inflammation with eosinophils, consistent with allergic contact dermatitis; see note      Note: Sections show a mildly acanthotic epidermis with mild spongiosis and overlying parakeratosis  The superficial dermis shows mild lymphocytic inflammation with some eosinophils  Atypical epidermotropic lymphocytes are not seen  In an appropriate clinical setting, the changes can be seen in allergic contact dermatitis  Clinical correlation is recommended         Interpretation performed at [facility], [address] [place] [address], [address] [place] [address]           Electronically signed by Mireya oRmo MD on 8/6/2019 at  1:04 PM  Additional Information   All controls performed with the immunohistochemical stains reported above reacted appropriately  These tests were developed and their performance characteristics determined by [facility] [address] [place] [address] or [address] [place] [address]  They may not be cleared or approved by the [place]  Food and Drug Administration  The FDA has determined that such clearance or approval is not necessary  These tests are used for clinical purposes  They should not be regarded as investigational or for research  This laboratory has been approved by [place] 88, designated as a high-complexity laboratory and is qualified to perform these tests  Gross Description   A  The specimen is received in formalin, labeled with the patient's name and hospital number, and is designated "left lateral buttock"  The specimen consists of a 0 3 x 0 3 cm punch biopsy of tan skin with underlying soft tissue to a depth of 0 5 cm  The specimen surface is keratotic and inked red and the margin of resection is inked green    The specimen is entirely submitted between sponges, 1 cassette      Note: The estimated total formalin fixation time based upon information provided by the submitting clinician and the standard processing schedule is under 72 hours      Cinda     Clinical Information   Specimen A; Skin; Punch biopsy; Left lateral buttock; 74year old male with scaling pink plaques;  Allergic contact dermatitis versus eczema craquelae versus much less likely mycosis fungoides  Resulting Agency [facility]      Specimen Collected: 08/01/19  4:01 PM Last Resulted: 08/06/19  1:04 PM     Order Details      View Encounter      Lab and Collection Details      Routing      Result History         Scans on Order [id number]       Lab Result Document - Document on 8/6/2019  1:05 PM

## 2019-08-12 ENCOUNTER — TELEPHONE (OUTPATIENT)
Dept: DERMATOLOGY | Facility: CLINIC | Age: 74
End: 2019-08-12

## 2019-08-12 NOTE — TELEPHONE ENCOUNTER
----- Message from Jose Roberto Barrett MD sent at 8/9/2019 12:09 PM EDT -----  DERMATOPATHOLOGY RESULT NOTE    Accession # or Case # (copied from Path Report):  Case: [id number]    Specimen Letter and Anatomic Location (copied from Path Report):  A  Skin, Left lateral buttock, punch biopsy:    Histopathological Diagnosis:    - Acanthosis with mild spongiosis and dermal inflammation with eosinophils, consistent with allergic contact dermatitis; see note      Note: Sections show a mildly acanthotic epidermis with mild spongiosis and overlying parakeratosis  The superficial dermis shows mild lymphocytic inflammation with some eosinophils  Atypical epidermotropic lymphocytes are not seen  In an appropriate clinical setting, the changes can be seen in allergic contact dermatitis  Clinical correlation is recommended  Simona Day of Lesion/Condition:  BENIGN    Provider has personally called patient and relayed results and plan:  yes    Plan:  I called patient x2 on his cell phone and x2 on his home phone; I left a voicemail on his home phone answering machine  The results of his biopsy confirm my clinical suspicion of an allergic contact dermatitis  This is likely from something he is contacting from his environment  DERM TEAM:  Please call Silvia Pineda back and review with him my desire for him to discontinue using any skin products other than my prescriptions and Eucerin cream to the area            Tissue Exam: [id number]   Order: [id number]   Status:  Final result   Visible to patient:  No (Not Released) Dx:  Rash   Component   Case Report  Surgical Pathology Report                         Case: [id number]                                   Authorizing Provider: Jose Roberto Barrett MD     Collected:           08/01/2019 1601              Ordering Location:     [facility]      Received:            08/01/2019 1601                                   [address] [place] [address]                                                                Pathologist:           Clark Deleon MD                                                                  Specimen:    Skin, Other, Left lateral buttock                                                        Final Diagnosis  A  Skin, Left lateral buttock, punch biopsy:  - Acanthosis with mild spongiosis and dermal inflammation with eosinophils, consistent with allergic contact dermatitis; see note      Note: Sections show a mildly acanthotic epidermis with mild spongiosis and overlying parakeratosis  The superficial dermis shows mild lymphocytic inflammation with some eosinophils  Atypical epidermotropic lymphocytes are not seen  In an appropriate clinical setting, the changes can be seen in allergic contact dermatitis  Clinical correlation is recommended         Interpretation performed at [facility], [address] [place] [address], [address] [place] [address]           Electronically signed by Clark Deleon MD on 8/6/2019 at  1:04 PM  Additional Information   All controls performed with the immunohistochemical stains reported above reacted appropriately  These tests were developed and their performance characteristics determined by [facility] or [facility]  They may not be cleared or approved by the [place]  Food and Drug Administration  The FDA has determined that such clearance or approval is not necessary  These tests are used for clinical purposes  They should not be regarded as investigational or for research  This laboratory has been approved by CLIA 88, designated as a high-complexity laboratory and is qualified to perform these tests  Gross Description   A  The specimen is received in formalin, labeled with the patient's name and hospital number, and is designated "left lateral buttock"  The specimen consists of a 0 3 x 0 3 cm punch biopsy of tan skin with underlying soft tissue to a depth of 0 5 cm    The specimen surface is keratotic and inked red and the margin of resection is inked green  The specimen is entirely submitted between sponges, 1 cassette      Note: The estimated total formalin fixation time based upon information provided by the submitting clinician and the standard processing schedule is under 72 hours      Cinda     Clinical Information   Specimen A; Skin; Punch biopsy; Left lateral buttock; 74year old male with scaling pink plaques;  Allergic contact dermatitis versus eczema craquelae versus much less likely mycosis fungoides  Resulting Agency [facility]      Specimen Collected: 08/01/19  4:01 PM Last Resulted: 08/06/19  1:04 PM      Order Details      View Encounter      Lab and Collection Details      Routing      Result History         Scans on Order [id number]       Lab Result Document - Document on 8/6/2019  1:05 PM

## 2019-08-19 ENCOUNTER — OFFICE VISIT (OUTPATIENT)
Dept: DERMATOLOGY | Facility: CLINIC | Age: 74
End: 2019-08-19
Payer: MEDICARE

## 2019-08-19 VITALS — HEIGHT: 70 IN | WEIGHT: 149.91 LBS | BODY MASS INDEX: 21.46 KG/M2

## 2019-08-19 DIAGNOSIS — L25.9 CONTACT DERMATITIS, UNSPECIFIED CONTACT DERMATITIS TYPE, UNSPECIFIED TRIGGER: Primary | ICD-10-CM

## 2019-08-19 DIAGNOSIS — Z48.02 VISIT FOR SUTURE REMOVAL: ICD-10-CM

## 2019-08-19 PROCEDURE — 99212 OFFICE O/P EST SF 10 MIN: CPT | Performed by: DERMATOLOGY

## 2019-08-19 RX ORDER — TRIAMCINOLONE ACETONIDE 1 MG/G
CREAM TOPICAL
Qty: 80 G | Refills: 3 | Status: SHIPPED | OUTPATIENT
Start: 2019-08-19 | End: 2019-08-30 | Stop reason: SDUPTHER

## 2019-08-19 NOTE — PATIENT INSTRUCTIONS
Triamcinolone Cream: Apply two times a day for 14 days STRAIGHT to affected areas  Then use Monday-Friday for 8 week  s

## 2019-08-19 NOTE — PROGRESS NOTES
[address] [facility] Note     Patient Name: Alen Ruiz  Encounter Date: 8/19/2019    Today's Chief Concerns:  Priscilla Kaur Concern #1:  Suture removal   Concern #2:  Pathology review    Past Medical History:  Have you ever had or currently have any of the following medical conditions or treatments? · HIV/AIDS: No  · Hepatitis B: No  · Hepatitis C: No   · Diabetes: No  · Tuberculosis: No  · Biologic Therapy/Chemotherapy: No  · Organ or Bone Marrow Transplantation: No  · Radiation Treatment: YES  · Cancer (If Yes, which types)- YES, prostate       Have you ever had any of the following skin conditions? · Melanoma? (If Yes, please provide more detail)- No  · Basal Cell Carcinoma: No  · Squamous Cell Carcinoma: No  · Sebaceous Cell Carcinoma: No  · Merkel Cell Carcinoma: No  · Angiosarcoma: No  · Blistering Sunburns: YES  · Eczema: No  · Psoriasis: No    Social History:    What is your current Smoking Status? Non smoker    What is/was your primary occupation? Retired    What are your hobbies/past-times? Family history:  Do any of your "first degree relatives" (parent, brother, sister, or child) have any of the following conditions? · Melanoma? (If Yes, which relatives?) No  · Eczema: No  · Asthma: No  · Hay Fever/Seasonal Allergies: No  · Psoriasis: No  · Arthritis: No  · Thyroid Problems: No  · Lupus/Connective Tissue Disease: No  · Diabetes: No  · Stroke: YES  · Blood Clots: YES  · IBD/Crohn's/Ulcerative Colitis: No  · Vitiligo: No  · Scarring/Keloids: No  · Severe Acne: No  · Pancreatic Cancer: No  · Other known Skin Condition? If Yes, what condition and which relatives?   No    Current Medications:    Current Outpatient Medications:     aspirin 81 MG tablet, Take 1 tablet by mouth daily, Disp: , Rfl:     atorvastatin (LIPITOR) 10 mg tablet, Take 10 mg by mouth daily  , Disp: , Rfl:     Cholecalciferol (VITAMIN D3) 2000 units capsule, Take 1 capsule by mouth daily, Disp: , Rfl:     digoxin (LANOXIN) 0 125 mg tablet, Take 125 mcg by mouth daily  , Disp: , Rfl:     hydrocortisone 2 5 % cream, Apply topically 3 (three) times a day, Disp: 120 g, Rfl: 12    hydrocortisone 2 5 % ointment, BEFORE applying your Eucerin cream, apply this medication twice a day fo no more then 10 days straight  (Patient not taking: Reported on 8/1/2019), Disp: 453 6 g, Rfl: 1    metoprolol succinate (TOPROL-XL) 25 mg 24 hr tablet, 1/2 tab daily , Disp: , Rfl:     ranitidine (ZANTAC) 150 mg tablet, Take one tablet after dinner, Disp: 90 tablet, Rfl: 1    tamsulosin (FLOMAX) 0 4 mg, Take 2 capsules (0 8 mg total) by mouth daily with dinner, Disp: 180 capsule, Rfl: 3    triamcinolone (KENALOG) 0 1 % cream, Apply two times a day to affected area for two weeks straight  Then Monday-Friday for 8 weeks  , Disp: 80 g, Rfl: 3    triamcinolone (KENALOG) 0 1 % ointment, Apply topically twice daily for 2 weeks, repeat as needed, Disp: 453 6 g, Rfl: 3    Specific Alerts:    Are you pregnant or planning to become pregnant? N/A    Are you currently or planning to be nursing or breast feeding? N/A    No Known Allergies    May we call your Preferred Phone number to discuss your specific medical information? YES    May we leave a detailed message that includes your specific medical information? YES    Have you traveled outside of the [facility] in the past 3 months? No    Do you currently have a pacemaker or defibrillator? No    Do you have any artificial heart valves, joints, plates, screws, rods, stents, pins, etc? No   - If Yes, were any placed within the last 2 years? Do you require any medications prior to a surgical procedure? No   - If Yes, for which procedure? n/a   - If Yes, what medications to you require? n/a    Are you taking any medications that cause you to bleed more easily ("blood thinners") No    Have you ever experienced a rapid heartbeat with epinephrine?  No    Have you ever been treated with "gold" (gold sodium thiomalate) therapy? No    [facility] can help with wrinkles, "laugh lines," facial volume loss, "double chin," "love handles," age spots, and more  Are you interested in learning today about some of the skin enhancement procedures that we offer? (If Yes, please provide more detail) No    Review of Systems:  Have you recently had or currently have any of the following? · Fever or chills: No  · Night Sweats: No  · Headaches: No  · Weight Gain: {No  · Weight Loss: No  · Blurry Vision: No  · Nausea: No  · Vomiting: No  · Diarrhea: No  · Blood in Stool: No  · Abdominal Pain: No  · Itchy Skin: YES  · Painful Joints: No  · Swollen Joints: No  · Muscle Pain: No  · Irregular Mole: No  · Sun Burn: No  · Dry Skin: YES  · Skin Color Changes: No  · Scar or Keloid: No  · Cold Sores/Fever Blisters: No  · Bacterial Infections/MRSA: No  · Anxiety: No  · Depression: No  · Suicidal or Homicidal Thoughts: No      PHYSICAL EXAM:      Was a chaperone (Derm Clinical Assistant) present for the entirety of the Physical Exam? YES    Did the Dermatology Team specifically ask and  the patient on the importance of a Full Skin Exam to be sure that nothing is missed clinically?  YES    Did the patient request or accept a Full Skin Exam?  No    Did the patient specifically refuse to have the areas "under-the-bra" examined by the Dermatologist? No    Did the patient specifically refuse to have the areas "under-the-underwear" examined by the Dermatologist? No      CONSTITUTIONAL:   Vitals:    08/19/19 1050   Weight: 68 kg (149 lb 14 6 oz)   Height: 5' 10" (1 778 m)         PSYCH: Normal mood and affect  EYES: Normal conjunctiva  ENT: Normal lips and oral mucosa  CARDIOVASCULAR: No edema  RESPIRATORY: Normal respirations  HEME/LYMPH/IMMUNO:  No regional lymphadenopathy except as noted below in [address] (SKIN)   Normal except as noted below in Assessment    Normal except as noted below in Assessment   Right Arm/Hand/Fingers Normal except as noted below in Assessment   Left Arm/Hand/Fingers Normal except as noted below in Assessment    Viewed areas Normal except as noted below in Assessment    Normal except as noted below in Assessment    Normal except as noted below in Assessment   Groin/Genitalia/Buttocks Viewed areas Normal except as noted below in Assessment                ASSESSMENT AND PLAN BY DIAGNOSIS:    History of Present Condition:     Duration:  How long has this been an issue for you?    o  Months   Location Affected:  Where on the body is this affecting you?    o  Bilateral buttock   Quality:  Is there any bleeding, pain, itch, burning/irritation, or redness associated with the skin lesion? o  Redness and itching   Severity:  Describe any bleeding, pain, itch, burning/irritation, or redness on a scale of 1 to 10 (with 10 being the worst)  o  4, area has become better   Timing:  Does this condition seem to be there pretty constantly or do you notice it more at specific times throughout the day?    o  Constantly   Context:  Have you ever noticed that this condition seems to be associated with specific activities you do?    o  Denies, cannot find the source    Modifying Factors:    o Anything that seems to make the condition worse?    -  Denies  o What have you tried to do to make the condition better?    -  Triamcinolone ointment    Associated Signs and Symptoms:  Does this skin lesion seem to be associated with any of the following:  o  DERM ASSOCIATED SIGNS AND SYMPTOMS: Redness and Itching and Scratching     Suture removal  Date/Time: 8/19/2019 10:56 AM  Performed by: Yasmin Fournier RN  Authorized by: Richa Angela MD     Patient location:  Clinic  Consent:     Consent obtained:  Verbal  Location:     Laterality:  Left    Location:  Trunk    Trunk location: Lateral buttock  Procedure details:      Tools used:  Suture removal kit  Post-procedure details:     Post-removal:  No dressing applied      2  PATHOLOGY REVIEW: CONTACT DERMATITIS  Physical Exam:   Anatomic Location Affected:  Bilateral buttock   Morphological Description:  Light pink somehat scaly plaques   Pertinent Positives:   Pertinent Negatives: Additional History of Present Condition:  Punch biopsy preformed during last visit    Assessment and Plan:  Based on a thorough discussion of this condition and the management approach to it (including a comprehensive discussion of the known risks, side effects and potential benefits of treatment), the patient (family) agrees to implement the following specific plan:   Discussed results with patient  Pt stated the Triamcinolone ointment is very thick and sticky   Prescribed Triamcinolone Cream, less stick and thick  Explained it is the same medication but with more preservatives  Pt may prefer cream verses ointment   Pt to apply Triamcinolone Cream two times a day for 14 days to affected areas then Monday-Friday for 8 weeks      Scribe Attestation    I,:   Jarvis Blum RN am acting as a scribe while in the presence of the attending physician :        I,:   Devante Day MD personally performed the services described in this documentation    as scribed in my presence :

## 2019-08-27 ENCOUNTER — TELEPHONE (OUTPATIENT)
Dept: UROLOGY | Facility: MEDICAL CENTER | Age: 74
End: 2019-08-27

## 2019-08-27 ENCOUNTER — APPOINTMENT (OUTPATIENT)
Dept: LAB | Facility: CLINIC | Age: 74
End: 2019-08-27
Payer: MEDICARE

## 2019-08-27 DIAGNOSIS — N39.0 URINARY TRACT INFECTION WITH HEMATURIA, SITE UNSPECIFIED: ICD-10-CM

## 2019-08-27 DIAGNOSIS — R31.9 URINARY TRACT INFECTION WITH HEMATURIA, SITE UNSPECIFIED: Primary | ICD-10-CM

## 2019-08-27 DIAGNOSIS — N39.0 URINARY TRACT INFECTION WITH HEMATURIA, SITE UNSPECIFIED: Primary | ICD-10-CM

## 2019-08-27 DIAGNOSIS — R31.9 URINARY TRACT INFECTION WITH HEMATURIA, SITE UNSPECIFIED: ICD-10-CM

## 2019-08-27 LAB
BACTERIA UR QL AUTO: ABNORMAL /HPF
BILIRUB UR QL STRIP: NEGATIVE
CLARITY UR: ABNORMAL
COLOR UR: YELLOW
GLUCOSE UR STRIP-MCNC: NEGATIVE MG/DL
HGB UR QL STRIP.AUTO: ABNORMAL
KETONES UR STRIP-MCNC: NEGATIVE MG/DL
LEUKOCYTE ESTERASE UR QL STRIP: ABNORMAL
NITRITE UR QL STRIP: POSITIVE
NON-SQ EPI CELLS URNS QL MICRO: ABNORMAL /HPF
PH UR STRIP.AUTO: 6 [PH]
PROT UR STRIP-MCNC: ABNORMAL MG/DL
RBC #/AREA URNS AUTO: ABNORMAL /HPF
SP GR UR STRIP.AUTO: 1.02 (ref 1–1.03)
UROBILINOGEN UR QL STRIP.AUTO: 0.2 E.U./DL
WBC #/AREA URNS AUTO: ABNORMAL /HPF

## 2019-08-27 PROCEDURE — 87077 CULTURE AEROBIC IDENTIFY: CPT

## 2019-08-27 PROCEDURE — 81001 URINALYSIS AUTO W/SCOPE: CPT

## 2019-08-27 PROCEDURE — 87186 SC STD MICRODIL/AGAR DIL: CPT

## 2019-08-27 PROCEDURE — 87086 URINE CULTURE/COLONY COUNT: CPT

## 2019-08-27 NOTE — TELEPHONE ENCOUNTER
Patient of Dr Aminah Buck with a history of BPH  I returned call and patient stated that he had urinary urgency, frequency in small amounts, and had seen a small amount of blood 1 time  He denies fever or chills  Order placed for ua, c/s    Patient will go to [facility]

## 2019-08-27 NOTE — TELEPHONE ENCOUNTER
Patient of Dr Heather Gan seen in [place], last seen by Nicole Hernandez at [facility] 02/01/19  Patient saw a very small amount of blood in his urine about a week ago  He's asking for a urinanalysis  He can be reached at [phone]

## 2019-08-28 ENCOUNTER — TELEPHONE (OUTPATIENT)
Dept: UROLOGY | Facility: CLINIC | Age: 74
End: 2019-08-28

## 2019-08-28 DIAGNOSIS — N39.0 URINARY TRACT INFECTION WITH HEMATURIA, SITE UNSPECIFIED: Primary | ICD-10-CM

## 2019-08-28 DIAGNOSIS — R31.9 URINARY TRACT INFECTION WITH HEMATURIA, SITE UNSPECIFIED: Primary | ICD-10-CM

## 2019-08-28 RX ORDER — CIPROFLOXACIN 500 MG/1
500 TABLET, FILM COATED ORAL EVERY 12 HOURS SCHEDULED
Qty: 10 TABLET | Refills: 0 | Status: SHIPPED | OUTPATIENT
Start: 2019-08-28 | End: 2019-09-02

## 2019-08-28 RX ORDER — NITROFURANTOIN 25; 75 MG/1; MG/1
100 CAPSULE ORAL 2 TIMES DAILY
Qty: 10 CAPSULE | Refills: 0 | Status: SHIPPED | OUTPATIENT
Start: 2019-08-28 | End: 2019-09-02

## 2019-08-28 NOTE — TELEPHONE ENCOUNTER
Called and spoke to patient  Made patient aware a new prescription of ciprofloxacin was sent to patients preferred pharmacy  Made patient aware that we will continue to monitor culture for results and call with these results  Patient verbalized understanding and denies any other questions

## 2019-08-28 NOTE — TELEPHONE ENCOUNTER
Patient transferred to me  Patient was given name of antibiotic that was prescribed  Patient states that this medication is very expensive  Patient is asking if he is able to wait until we have the results of the urine culture before getting this antibiotic? Explained to patient that we will send this note to the PA and someone from clinical staff will call with instructions  Patient understood

## 2019-08-28 NOTE — TELEPHONE ENCOUNTER
Called and spoke to patient in regards to urine samples  Explained to him that the GONZALO has initiated an antibiotic due to the result of the urine  Explained to him that it was sent to his pharmacy and that once we get the urine culture results we will call him whether or not he needs a new antibiotic or to complete the Nitrofurantoin  Patient was confused as to why we should wait for the urine culture instead of starting an antibiotic  Explained to him that we want to begin treating it so it does not get worse  Patient understood and will go get his antibiotic and start it right away       ----- Message from Tessa Eugene PA-C sent at 8/28/2019  8:20 AM EDT -----  I have sent a 5 day course of nitrofurantoin to the patient's preferred pharmacy  I will continue to monitor for culture results and adjust antibiotic if needed

## 2019-08-28 NOTE — TELEPHONE ENCOUNTER
I have not heard the complain about nitrofurantoin in the past   I have sent a prescription of ciprofloxacin to the patient's preferred pharmacy  Hopefully this is a more affordable option  Will continue to monitor culture for results

## 2019-08-29 ENCOUNTER — TELEPHONE (OUTPATIENT)
Dept: UROLOGY | Facility: CLINIC | Age: 74
End: 2019-08-29

## 2019-08-29 LAB — BACTERIA UR CULT: ABNORMAL

## 2019-08-29 NOTE — TELEPHONE ENCOUNTER
----- Message from Aileen León PA-C sent at 8/29/2019 12:00 PM EDT -----  Can you please confirm watch antibiotic this patient is taking? My note says 5 days of nitrofurantoin but the patient also has a prescription of ciprofloxacin  Susceptibility show that the UTI is intermediately susceptible to nitrofurantoin  I wou  ld recommend continuing ciprofloxacin if the patient has that  If he needs another prescription, please let me know

## 2019-08-29 NOTE — TELEPHONE ENCOUNTER
Left message to advise patient to complete antibiotics as prescribed  Patient to call office back with any questions  Office number left on voicemail

## 2019-08-30 ENCOUNTER — TELEPHONE (OUTPATIENT)
Dept: UROLOGY | Facility: AMBULATORY SURGERY CENTER | Age: 74
End: 2019-08-30

## 2019-08-30 DIAGNOSIS — L25.9 CONTACT DERMATITIS, UNSPECIFIED CONTACT DERMATITIS TYPE, UNSPECIFIED TRIGGER: ICD-10-CM

## 2019-08-30 RX ORDER — TRIAMCINOLONE ACETONIDE 1 MG/G
CREAM TOPICAL
Qty: 453.6 G | Refills: 3 | Status: SHIPPED | OUTPATIENT
Start: 2019-08-30 | End: 2019-12-23

## 2019-08-30 NOTE — TELEPHONE ENCOUNTER
Pt called and would like a call back with the Urine Culture Results can not see them on his chart    Please call him [phone]

## 2019-08-30 NOTE — TELEPHONE ENCOUNTER
Call placed to patient, discussed urine culture and advised to take Cipro as prescribed  Patient verbalized understanding and agrees with plan

## 2019-08-30 NOTE — TELEPHONE ENCOUNTER
Please see 08/29/19 message Test Results Questions pt intended for clinical instead went to Dr Blandon

## 2019-09-17 ENCOUNTER — TELEPHONE (OUTPATIENT)
Dept: UROLOGY | Facility: MEDICAL CENTER | Age: 74
End: 2019-09-17

## 2019-09-17 ENCOUNTER — TELEPHONE (OUTPATIENT)
Dept: UROLOGY | Facility: AMBULATORY SURGERY CENTER | Age: 74
End: 2019-09-17

## 2019-09-17 ENCOUNTER — APPOINTMENT (OUTPATIENT)
Dept: LAB | Facility: HOSPITAL | Age: 74
End: 2019-09-17
Payer: MEDICARE

## 2019-09-17 DIAGNOSIS — R39.15 URINARY URGENCY: Primary | ICD-10-CM

## 2019-09-17 DIAGNOSIS — R39.15 URINARY URGENCY: ICD-10-CM

## 2019-09-17 LAB
BACTERIA UR QL AUTO: ABNORMAL /HPF
BILIRUB UR QL STRIP: NEGATIVE
CLARITY UR: ABNORMAL
COLOR UR: YELLOW
GLUCOSE UR STRIP-MCNC: NEGATIVE MG/DL
HGB UR QL STRIP.AUTO: ABNORMAL
KETONES UR STRIP-MCNC: NEGATIVE MG/DL
LEUKOCYTE ESTERASE UR QL STRIP: ABNORMAL
NITRITE UR QL STRIP: NEGATIVE
NON-SQ EPI CELLS URNS QL MICRO: ABNORMAL /HPF
PH UR STRIP.AUTO: 5 [PH]
PROT UR STRIP-MCNC: ABNORMAL MG/DL
RBC #/AREA URNS AUTO: ABNORMAL /HPF
SP GR UR STRIP.AUTO: 1.02 (ref 1–1.03)
UROBILINOGEN UR QL STRIP.AUTO: 0.2 E.U./DL
WBC #/AREA URNS AUTO: ABNORMAL /HPF

## 2019-09-17 PROCEDURE — 81001 URINALYSIS AUTO W/SCOPE: CPT

## 2019-09-17 PROCEDURE — 87086 URINE CULTURE/COLONY COUNT: CPT

## 2019-09-17 PROCEDURE — 87077 CULTURE AEROBIC IDENTIFY: CPT

## 2019-09-17 PROCEDURE — 87186 SC STD MICRODIL/AGAR DIL: CPT

## 2019-09-17 NOTE — TELEPHONE ENCOUNTER
Patient of Dr Vishal Garcia seen at [place]  Patient called stating that he is experiencing urgency of urination and urine is cloudy and appears to have oil floating on top of it  Patient also sent an electronic message to Dr Vishal Garcia today  He would like to speak to a nurse  He can be reached at [phone]

## 2019-09-17 NOTE — TELEPHONE ENCOUNTER
----- Message from Marguerite Servin sent at 9/17/2019 10:20 AM EDT -----  Regarding: Non-Urgent Medical Question  Contact: [phone]  Within a few weeks after taking the antibiotic medication for the urinary tract infection, I have seen cloudiness in my urine  It looks like thousands of tiny droplets of clear oil floating on top  Also, recently I have scene a stringy type of slightly dark mass that comes out with the urine  Also, I often have strong, quick urges to urinate, sometimes as close as 15 minutes after having urinated  When can I come in to have a look at the urine?

## 2019-09-17 NOTE — TELEPHONE ENCOUNTER
Patient of Dr Antonio Bhatt, managed at [facility]  Patient was last seen in May for cysto  History prostate cancer s/p radiation in 2016  Patient treated in August for positive urine culture  Patient know complaining of "stringy dark material", also urgency and frequency  Advised patient order placed for urine testing to rule out infection  Office will call with results in 2-3 days, patient to hydrate well with water in meantime and call office with any worsening symptoms  Patient verbalized understanding and agrees with plan

## 2019-09-19 ENCOUNTER — TELEPHONE (OUTPATIENT)
Dept: UROLOGY | Facility: CLINIC | Age: 74
End: 2019-09-19

## 2019-09-19 DIAGNOSIS — N39.0 URINARY TRACT INFECTION WITH HEMATURIA, SITE UNSPECIFIED: Primary | ICD-10-CM

## 2019-09-19 DIAGNOSIS — R31.9 URINARY TRACT INFECTION WITH HEMATURIA, SITE UNSPECIFIED: Primary | ICD-10-CM

## 2019-09-19 LAB — BACTERIA UR CULT: ABNORMAL

## 2019-09-19 RX ORDER — CEPHALEXIN 500 MG/1
500 CAPSULE ORAL EVERY 8 HOURS SCHEDULED
Qty: 15 CAPSULE | Refills: 0 | Status: SHIPPED | OUTPATIENT
Start: 2019-09-19 | End: 2019-09-24

## 2019-09-19 NOTE — TELEPHONE ENCOUNTER
Patient was instructed to call the last day of his antibiotics if he is still having urinary symptoms and we can order a few more days of an antibiotic  He stated that he wanted to have a urine test done whether he had symptoms or not  I explained that we do not like to treat UTI's that are asymptomatic    He verbalized understanding

## 2019-09-19 NOTE — TELEPHONE ENCOUNTER
Patient called back  Called regarding using the same medication prior to this  Patient requested to have another UA scheduled prior to finish of the medication in order to assure the infection is gone  He can be reached at [phone]    Thank you

## 2019-09-19 NOTE — TELEPHONE ENCOUNTER
Called and spoke to patient  Made him aware that his urine testing was positive for a UTI and that Keflex was sent to [facility] for him  Patient verbalized understanding and denies any other questions or concerns

## 2019-09-23 ENCOUNTER — TELEPHONE (OUTPATIENT)
Dept: DERMATOLOGY | Facility: CLINIC | Age: 74
End: 2019-09-23

## 2019-09-23 NOTE — TELEPHONE ENCOUNTER
Telephone call to patient, spoke to patient in regards above message  Pt is to used [facility] only as needed not as a moisturizer  Recommendation of Craved and Cetaphil cream given to patient  And repeated f used [facility] to used only for two weeks straight                 ----- Message from Miriam Lopez sent at 9/23/2019 12:40 PM EDT -----  Regarding: Prescription Question  Contact: [phone]  Dr Ayah Schulte originally had prescribed the hydrocortisone cream 2 5% for me, back in May of this year  Upon visiting my family doctor, several weeks ago, I had him write out a prescription for a few "one ounce TUBES" of cortisone cream, not knowing that this would confuse the pharmacy in the order coming from Dr Ayah Schulte for the tub of 2 5% cortisone cream   Although I have one refill left on the cortisone cream in the tub amount, that doctor Ayah Schulte prescribed, because of law, the pharmacy will not send me another tub of the cortisone cream unless Dr Ayah Schulte sends in another new prescription for the tub and also notes that this is a change in the dosage, even although it really is not  Please have Dr Ayah Schulte send in a new prescription for the TUB AMOUNT of 2 5% cortisone CREAM  And also state that this prescription is a change in the dosage  Thank you! Sherre Soulier

## 2019-09-30 ENCOUNTER — TELEPHONE (OUTPATIENT)
Dept: DERMATOLOGY | Facility: CLINIC | Age: 74
End: 2019-09-30

## 2019-09-30 NOTE — TELEPHONE ENCOUNTER
Telephone call to patient   Spoke to patient in regards above message  Explained to patient the used of topical ointment  Advise patient to not over used the steroid cream, pt request refills explained pharmacy would not refill due to the over used of it   Patient request   To go on the patch testing list pt aware we will call him back with date and times

## 2019-09-30 NOTE — TELEPHONE ENCOUNTER
----- Message from Quentin Richards sent at 9/30/2019 10:37 AM EDT -----  Regarding: Non-Urgent Medical Question  Contact: [phone]  I'm itching to get a patch on my back that will tell me what I'm allergic to  How soon can I come in to get the patch?

## 2019-10-01 ENCOUNTER — APPOINTMENT (OUTPATIENT)
Dept: LAB | Facility: HOSPITAL | Age: 74
End: 2019-10-01
Payer: MEDICARE

## 2019-10-01 ENCOUNTER — TELEPHONE (OUTPATIENT)
Dept: UROLOGY | Facility: MEDICAL CENTER | Age: 74
End: 2019-10-01

## 2019-10-01 DIAGNOSIS — N39.0 URINARY TRACT INFECTION WITHOUT HEMATURIA, SITE UNSPECIFIED: ICD-10-CM

## 2019-10-01 DIAGNOSIS — N39.0 URINARY TRACT INFECTION WITHOUT HEMATURIA, SITE UNSPECIFIED: Primary | ICD-10-CM

## 2019-10-01 PROCEDURE — 87086 URINE CULTURE/COLONY COUNT: CPT

## 2019-10-01 NOTE — TELEPHONE ENCOUNTER
Patient of Dr Martín Elliott seen in the [place] office  Patient called and requested a urine culture script as he is at the lab waiting to take a urine culture  Please issue order ad fax to [phone]  Please advise

## 2019-10-02 LAB — BACTERIA UR CULT: NORMAL

## 2019-10-17 ENCOUNTER — TELEPHONE (OUTPATIENT)
Dept: DERMATOLOGY | Facility: CLINIC | Age: 74
End: 2019-10-17

## 2019-10-17 DIAGNOSIS — L23.9 ALLERGIC CONTACT DERMATITIS, UNSPECIFIED TRIGGER: Primary | ICD-10-CM

## 2019-10-17 NOTE — TELEPHONE ENCOUNTER
Spoke with patient script was sent to [facility] and needs to be [facility]  Called pharmacy gave verbal order of hydrocortisone cream 2 5% cream apply once a day Monday through Friday  Avoid face and groin

## 2019-10-17 NOTE — TELEPHONE ENCOUNTER
Patient states he wants a refill on the hydrocortisone since its the only thing that worked and he has itching  Patient refusing to use the triamcinolone cream   Advised the patch testing day still not avilable and will call him once it  Patient wants to talk to Cece Bowden since there a misunderstanding

## 2019-10-17 NOTE — TELEPHONE ENCOUNTER
Script for Hydrocortisone 2 5% e-scribed    Please call patient back and let him know to discontinue Triamcinolone

## 2019-10-21 ENCOUNTER — TELEPHONE (OUTPATIENT)
Dept: UROLOGY | Facility: AMBULATORY SURGERY CENTER | Age: 74
End: 2019-10-21

## 2019-10-21 NOTE — TELEPHONE ENCOUNTER
Patient managed by Dr William called to say he will continue his care with Dr Duncan January when Barbara Carpenter  He will also keep his follow up with Melissa Song

## 2019-11-08 ENCOUNTER — TELEPHONE (OUTPATIENT)
Dept: UROLOGY | Facility: MEDICAL CENTER | Age: 74
End: 2019-11-08

## 2019-11-08 ENCOUNTER — APPOINTMENT (OUTPATIENT)
Dept: LAB | Facility: HOSPITAL | Age: 74
End: 2019-11-08
Payer: MEDICARE

## 2019-11-08 DIAGNOSIS — R35.0 FREQUENCY OF URINATION: Primary | ICD-10-CM

## 2019-11-08 DIAGNOSIS — R39.15 URGENCY OF URINATION: ICD-10-CM

## 2019-11-08 DIAGNOSIS — Z87.440 HX: UTI (URINARY TRACT INFECTION): ICD-10-CM

## 2019-11-08 LAB
BACTERIA UR QL AUTO: ABNORMAL /HPF
BILIRUB UR QL STRIP: NEGATIVE
CAOX CRY URNS QL MICRO: ABNORMAL /HPF
CLARITY UR: CLEAR
COLOR UR: YELLOW
GLUCOSE UR STRIP-MCNC: NEGATIVE MG/DL
HGB UR QL STRIP.AUTO: NEGATIVE
KETONES UR STRIP-MCNC: NEGATIVE MG/DL
LEUKOCYTE ESTERASE UR QL STRIP: NEGATIVE
NITRITE UR QL STRIP: NEGATIVE
NON-SQ EPI CELLS URNS QL MICRO: ABNORMAL /HPF
PH UR STRIP.AUTO: 5 [PH]
PROT UR STRIP-MCNC: NEGATIVE MG/DL
PSA SERPL-MCNC: 0.3 NG/ML
RBC #/AREA URNS AUTO: ABNORMAL /HPF
SP GR UR STRIP.AUTO: >=1.03 (ref 1–1.03)
UROBILINOGEN UR QL STRIP.AUTO: 0.2 E.U./DL
WBC #/AREA URNS AUTO: ABNORMAL /HPF

## 2019-11-08 PROCEDURE — 87086 URINE CULTURE/COLONY COUNT: CPT

## 2019-11-08 PROCEDURE — 81001 URINALYSIS AUTO W/SCOPE: CPT

## 2019-11-08 NOTE — TELEPHONE ENCOUNTER
Called and spoke with patient, states he has severe frequency, urgency, incomplete emptying and nocturia (3-4 times a night) for the last 1-2 weeks  States urine is yellow in color but has "stuff in it"  Patient describes it as "clear oil"  Patient denies, fever, chills, nausea, vomiting  Patient would like urine testing to be done to have for appointment Tuesday 11/12 with Wallace  Patient states he will go to lab after phone call to be tested  Reiterated the importance of hydration and bladder irritants  Patient verbalized understanding, all questions answered at this time

## 2019-11-08 NOTE — TELEPHONE ENCOUNTER
Patient of Dr Miguelito Irvin seen in [place] office  Patient requesting to speak to nurse in regards to urgency and frequency  Patient believes he may have infection please advise

## 2019-11-09 LAB — BACTERIA UR CULT: NORMAL

## 2019-11-10 NOTE — TELEPHONE ENCOUNTER
Please inform patient results of recent urine culture is negative for infection  Will discuss symptoms at upcoming office visit

## 2019-11-11 NOTE — PROGRESS NOTES
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)  -Urethral stricture  -BPH with LUTS    Discussion/Plan  Jessica Myers is a 66 y o  male being managed by Dr Onur Juarez        -We reviewed results of his recent PSA which is 0 3, previously 0 2  He is status post radiation therapy in October 2016  We will continue to monitor PSA every 6 months  -Discussed his lower urinary tract symptoms and history of urethral stricture  I recommend patient increase CIC to at least once/week to maintain patency  Encouraged patient to use coude catheter for history of BPH and lubricant to assist with passing catheter  He will continue to take Flomax 0 8mg HS  -Reviewed results of recent UA with microscopic which identified calcium oxalate crystals  Patient denies any flank pain, but does feel his lower urinary tract symptoms have gotten worse over the last few months  He also reports a remote history of kidney stones  I recommend obtaining a renal ultrasound for further evaluation  Advised patient that someone from office will call with results  -He will return to the office in 6 months with PSA/TOSIN and PVR assessment  Patient instructed to call with any issues  -All questions answered, patient agrees with plan      History of Present Illness  66 y o  male with a history of Gl 7 prostate cancer s/p radiation therapy (10/2016) and urethral stricture presents today for follow up  Patient continues to perform CIC as needed for history of urethral stricture  He underwent dilation performed in office in May 2019  Cystoscopic evaluation identified +1 enlarged prostate and bulbar stricture  Patient states the last time he performed CIC was 2 months ago  He reports occasional difficulty with passing catheter, but denies any pain or bleeding  His last PSA 5/6/19 was 0 2  Previously 0 3 (10/24/18), 0 4 (4/17/18)  He continues to take Flomax 0 8mg HS  Patient reports symptoms of weak urinary stream and incomplete bladder emptying  He denies any gross hematuria or dysuria  Patient was treated for UTI in August and September 2019 for Klebsiella pneumoniae  Recent urine culture was negative for infection  UA with microscopic identified 1-2 RBC and calcium oxalate crystals  Review of Systems  Review of Systems   Constitutional: Negative  HENT: Negative  Respiratory: Negative  Cardiovascular: Negative  Gastrointestinal: Negative  Genitourinary: Positive for decreased urine volume  Negative for difficulty urinating, dysuria, flank pain, frequency, hematuria and urgency  Musculoskeletal: Negative  Skin: Negative  Neurological: Negative  Psychiatric/Behavioral: Negative  AUA SYMPTOM SCORE      Most Recent Value   AUA SYMPTOM SCORE   How often have you had a sensation of not emptying your bladder completely after you finished urinating? 0   How often have you had to urinate again less than two hours after you finished urinating? 3   How often have you found you stopped and started again several times when you urinate? 2   How often have you found it difficult to postpone urination? 3   How often have you had a weak urinary stream?  5   How often have you had to push or strain to begin urination? 0   How many times did you most typically get up to urinate from the time you went to bed at night until the time you got up in the morning?   3   Quality of Life: If you were to spend the rest of your life with your urinary condition just the way it is now, how would you feel about that?  3   AUA SYMPTOM SCORE  16          Past Medical History  Past Medical History:   Diagnosis Date    Diverticulitis w/o hemorrhage 2008    Dyspepsia 2005    Nephrolithiasis     PVC (premature ventricular contraction) 03/30/2012       Past Social History  Past Surgical History:   Procedure Laterality Date    CATARACT EXTRACTION Right 06/2018  CATARACT EXTRACTION Left 08/30/2018    COLONOSCOPY W/ POLYPECTOMY  04/04/2006    PROSTATE BIOPSY  06/03/2016    Prostate Needle Biopsy     SHOULDER SURGERY Left     Arthrocentesis of the shoulder sub-=acronial bursa area    UMBILICAL HERNIA REPAIR      UPPER GASTROINTESTINAL ENDOSCOPY         Past Family History  Family History   Problem Relation Age of Onset    Hypertension Mother     Stroke Mother     Diabetes Mother     Gout Father     Alzheimer's disease Father     Diabetes Family         Mellitus       Past Social history  Social History     Socioeconomic History    Marital status:       Spouse name: Not on file    Number of children: Not on file    Years of education: Not on file    Highest education level: Not on file   Occupational History    Not on file   Social Needs    Financial resource strain: Not on file    Food insecurity:     Worry: Not on file     Inability: Not on file    Transportation needs:     Medical: Not on file     Non-medical: Not on file   Tobacco Use    Smoking status: Never Smoker    Smokeless tobacco: Never Used   Substance and Sexual Activity    Alcohol use: No    Drug use: No    Sexual activity: Never   Lifestyle    Physical activity:     Days per week: Not on file     Minutes per session: Not on file    Stress: Not on file   Relationships    Social connections:     Talks on phone: Not on file     Gets together: Not on file     Attends [employer] service: Not on file     Active member of club or organization: Not on file     Attends meetings of clubs or organizations: Not on file     Relationship status: Not on file    Intimate partner violence:     Fear of current or ex partner: Not on file     Emotionally abused: Not on file     Physically abused: Not on file     Forced sexual activity: Not on file   Other Topics Concern    Not on file   Social History Narrative    Not on file       Current Medications  Current Outpatient Medications Medication Sig Dispense Refill    aspirin 81 MG tablet Take 1 tablet by mouth daily      atorvastatin (LIPITOR) 10 mg tablet Take 10 mg by mouth daily        Cholecalciferol (VITAMIN D3) 2000 units capsule Take 1 capsule by mouth daily      digoxin (LANOXIN) 0 125 mg tablet Take 125 mcg by mouth daily        hydrocortisone 2 5 % cream Apply no more than 1 time a day on Mondays through Fridays to trunk and buttocks  Do NOT use on face or groin  453 6 g 1    metoprolol succinate (TOPROL-XL) 25 mg 24 hr tablet 1/2 tab daily       ranitidine (ZANTAC) 150 mg tablet Take one tablet after dinner 90 tablet 1    tamsulosin (FLOMAX) 0 4 mg Take 2 capsules (0 8 mg total) by mouth daily with dinner 180 capsule 3    triamcinolone (KENALOG) 0 1 % cream Apply two times a day to affected area for two weeks straight  Then Monday-Friday for 8 weeks  453 6 g 3    triamcinolone (KENALOG) 0 1 % ointment Apply topically twice daily for 2 weeks, repeat as needed 453 6 g 3     No current facility-administered medications for this visit  Allergies  No Known Allergies    Past Medical History, Social History, Family History, medications and allergies were reviewed  Vitals  There were no vitals filed for this visit  Physical Exam  Physical Exam   Constitutional: He is oriented to person, place, and time  He appears well-developed and well-nourished  HENT:   Head: Normocephalic  Eyes: Pupils are equal, round, and reactive to light  Neck: Normal range of motion  Cardiovascular: Normal rate and regular rhythm  Pulmonary/Chest: Effort normal    Abdominal: Soft  Normal appearance  He exhibits no distension  There is no tenderness  There is no CVA tenderness  Genitourinary:   Genitourinary Comments: Negative CVA tenderness  No suprapubic discomfort or distension    Musculoskeletal: Normal range of motion  Neurological: He is alert and oriented to person, place, and time  Skin: Skin is warm and dry  Psychiatric: He has a normal mood and affect  His behavior is normal  Judgment and thought content normal        Results    I have personally reviewed all pertinent lab results and reviewed with patient  Lab Results   Component Value Date    PSA 0 3 11/07/2019    PSA 0 2 05/06/2019    PSA 0 3 10/24/2018     Lab Results   Component Value Date    CALCIUM 8 8 07/03/2019     06/18/2016    K 4 1 07/03/2019    CO2 28 07/03/2019     07/03/2019    BUN 17 07/03/2019    CREATININE 1 11 07/03/2019     Lab Results   Component Value Date    WBC 4 7 07/03/2019    HGB 13 7 07/03/2019    HCT 42 0 07/03/2019    MCV 89 2 07/03/2019     07/03/2019     No results found for this or any previous visit (from the past 1 hour(s))

## 2019-11-12 ENCOUNTER — OFFICE VISIT (OUTPATIENT)
Dept: UROLOGY | Facility: AMBULATORY SURGERY CENTER | Age: 74
End: 2019-11-12
Payer: MEDICARE

## 2019-11-12 VITALS
WEIGHT: 148 LBS | BODY MASS INDEX: 21.19 KG/M2 | HEIGHT: 70 IN | SYSTOLIC BLOOD PRESSURE: 124 MMHG | DIASTOLIC BLOOD PRESSURE: 70 MMHG | HEART RATE: 60 BPM

## 2019-11-12 DIAGNOSIS — R31.29 MICROSCOPIC HEMATURIA: ICD-10-CM

## 2019-11-12 DIAGNOSIS — N13.8 BPH WITH OBSTRUCTION/LOWER URINARY TRACT SYMPTOMS: ICD-10-CM

## 2019-11-12 DIAGNOSIS — N40.1 BPH WITH OBSTRUCTION/LOWER URINARY TRACT SYMPTOMS: ICD-10-CM

## 2019-11-12 DIAGNOSIS — C61 ADENOCARCINOMA OF PROSTATE (HCC): Primary | ICD-10-CM

## 2019-11-12 PROCEDURE — 99214 OFFICE O/P EST MOD 30 MIN: CPT | Performed by: NURSE PRACTITIONER

## 2019-11-13 ENCOUNTER — TELEPHONE (OUTPATIENT)
Dept: UROLOGY | Facility: AMBULATORY SURGERY CENTER | Age: 74
End: 2019-11-13

## 2019-11-13 ENCOUNTER — HOSPITAL ENCOUNTER (OUTPATIENT)
Dept: ULTRASOUND IMAGING | Facility: HOSPITAL | Age: 74
Discharge: HOME | End: 2019-11-13
Payer: MEDICARE

## 2019-11-13 DIAGNOSIS — R31.29 MICROSCOPIC HEMATURIA: ICD-10-CM

## 2019-11-13 PROCEDURE — 76770 US EXAM ABDO BACK WALL COMP: CPT

## 2019-12-23 ENCOUNTER — OFFICE VISIT (OUTPATIENT)
Dept: FAMILY MEDICINE CLINIC | Facility: CLINIC | Age: 74
End: 2019-12-23
Payer: MEDICARE

## 2019-12-23 VITALS
HEIGHT: 71 IN | BODY MASS INDEX: 20.59 KG/M2 | SYSTOLIC BLOOD PRESSURE: 100 MMHG | WEIGHT: 147.1 LBS | HEART RATE: 76 BPM | RESPIRATION RATE: 16 BRPM | OXYGEN SATURATION: 98 % | TEMPERATURE: 99 F | DIASTOLIC BLOOD PRESSURE: 62 MMHG

## 2019-12-23 DIAGNOSIS — R97.20 ELEVATED PSA: ICD-10-CM

## 2019-12-23 DIAGNOSIS — E55.9 VITAMIN D DEFICIENCY: ICD-10-CM

## 2019-12-23 DIAGNOSIS — I25.10 CHRONIC CORONARY ARTERY DISEASE: ICD-10-CM

## 2019-12-23 DIAGNOSIS — E03.8 SUBCLINICAL HYPOTHYROIDISM: ICD-10-CM

## 2019-12-23 DIAGNOSIS — L29.9 PRURITUS: Chronic | ICD-10-CM

## 2019-12-23 DIAGNOSIS — E78.5 HYPERLIPIDEMIA, UNSPECIFIED HYPERLIPIDEMIA TYPE: ICD-10-CM

## 2019-12-23 DIAGNOSIS — N13.8 BPH WITH OBSTRUCTION/LOWER URINARY TRACT SYMPTOMS: ICD-10-CM

## 2019-12-23 DIAGNOSIS — C61 ADENOCARCINOMA OF PROSTATE (HCC): ICD-10-CM

## 2019-12-23 DIAGNOSIS — N40.1 BPH WITH OBSTRUCTION/LOWER URINARY TRACT SYMPTOMS: ICD-10-CM

## 2019-12-23 DIAGNOSIS — Z23 IMMUNIZATION DUE: Primary | ICD-10-CM

## 2019-12-23 PROBLEM — Z01.818 PREPROCEDURAL GENERAL PHYSICAL EXAMINATION: Status: RESOLVED | Noted: 2018-08-28 | Resolved: 2019-12-23

## 2019-12-23 LAB
T4 FREE SERPL-MCNC: 0.82 NG/DL (ref 0.76–1.46)
TSH SERPL DL<=0.05 MIU/L-ACNC: 4.16 UIU/ML (ref 0.36–3.74)

## 2019-12-23 PROCEDURE — 36415 COLL VENOUS BLD VENIPUNCTURE: CPT | Performed by: FAMILY MEDICINE

## 2019-12-23 PROCEDURE — G0008 ADMIN INFLUENZA VIRUS VAC: HCPCS

## 2019-12-23 PROCEDURE — 84439 ASSAY OF FREE THYROXINE: CPT | Performed by: FAMILY MEDICINE

## 2019-12-23 PROCEDURE — G0439 PPPS, SUBSEQ VISIT: HCPCS | Performed by: FAMILY MEDICINE

## 2019-12-23 PROCEDURE — 90662 IIV NO PRSV INCREASED AG IM: CPT

## 2019-12-23 PROCEDURE — 84443 ASSAY THYROID STIM HORMONE: CPT | Performed by: FAMILY MEDICINE

## 2019-12-23 PROCEDURE — 99214 OFFICE O/P EST MOD 30 MIN: CPT | Performed by: FAMILY MEDICINE

## 2019-12-23 RX ORDER — NITROGLYCERIN 0.4 MG/1
TABLET SUBLINGUAL
Refills: 2 | COMMUNITY
Start: 2019-11-25

## 2019-12-23 NOTE — PROGRESS NOTES
Assessment and Plan:     Problem List Items Addressed This Visit     None      Visit Diagnoses     Immunization due    -  Primary           Preventive health issues were discussed with patient, and age appropriate screening tests were ordered as noted in patient's After Visit Summary  Personalized health advice and appropriate referrals for health education or preventive services given if needed, as noted in patient's After Visit Summary       History of Present Illness:     Patient presents for Medicare Annual Wellness visit    Patient Care Team:  Katy Warner MD as PCP - General  MD Natalia Lieberman MD Robbert Sleeper, MD Alisa Raisin, MD     Problem List:     Patient Active Problem List   Diagnosis    Adenocarcinoma of prostate [facility])    BPH with obstruction/lower urinary tract symptoms    Elevated PSA    Chronic coronary artery disease    Environmental and seasonal allergies    Hyperlipidemia    Nuclear sclerosis of right eye    Osteoporosis    Lung field abnormal    Primary localized osteoarthritis of knees, bilateral    Vitamin D deficiency    Preprocedural general physical examination    Dermatitis    Pruritus      Past Medical and Surgical History:     Past Medical History:   Diagnosis Date    Diverticulitis w/o hemorrhage 2008    Dyspepsia 2005    Nephrolithiasis     PVC (premature ventricular contraction) 03/30/2012     Past Surgical History:   Procedure Laterality Date    CATARACT EXTRACTION Right 06/2018    CATARACT EXTRACTION Left 08/30/2018    COLONOSCOPY W/ POLYPECTOMY  04/04/2006    PROSTATE BIOPSY  06/03/2016    Prostate Needle Biopsy     SHOULDER SURGERY Left     Arthrocentesis of the shoulder sub-=acronial bursa area    UMBILICAL HERNIA REPAIR      UPPER GASTROINTESTINAL ENDOSCOPY        Family History:     Family History   Problem Relation Age of Onset    Hypertension Mother     Stroke Mother     Diabetes Mother     Gout Father     Alzheimer's disease Father     Diabetes Family         Mellitus      Social History:     Social History     Socioeconomic History    Marital status:       Spouse name: None    Number of children: None    Years of education: None    Highest education level: None   Occupational History    None   Social Needs    Financial resource strain: None    Food insecurity:     Worry: None     Inability: None    Transportation needs:     Medical: None     Non-medical: None   Tobacco Use    Smoking status: Never Smoker    Smokeless tobacco: Never Used   Substance and Sexual Activity    Alcohol use: No    Drug use: No    Sexual activity: Never   Lifestyle    Physical activity:     Days per week: None     Minutes per session: None    Stress: None   Relationships    Social connections:     Talks on phone: None     Gets together: None     Attends Mandaen service: None     Active member of club or organization: None     Attends meetings of clubs or organizations: None     Relationship status: None    Intimate partner violence:     Fear of current or ex partner: None     Emotionally abused: None     Physically abused: None     Forced sexual activity: None   Other Topics Concern    None   Social History Narrative    None       Medications and Allergies:     Current Outpatient Medications   Medication Sig Dispense Refill    aspirin 81 MG tablet Take 1 tablet by mouth daily      atorvastatin (LIPITOR) 10 mg tablet Take 10 mg by mouth daily        Cholecalciferol (VITAMIN D3) 2000 units capsule Take 1 capsule by mouth daily      digoxin (LANOXIN) 0 125 mg tablet Take 125 mcg by mouth daily        metoprolol succinate (TOPROL-XL) 25 mg 24 hr tablet 1/2 tab daily       nitroglycerin (NITROSTAT) 0 4 mg SL tablet PLEASE SEE ATTACHED FOR DETAILED DIRECTIONS  2    tamsulosin (FLOMAX) 0 4 mg Take 2 capsules (0 8 mg total) by mouth daily with dinner 180 capsule 3    hydrocortisone 2 5 % cream Apply no more than 1 time a day on Mondays through Fridays to trunk and buttocks  Do NOT use on face or groin  (Patient not taking: Reported on 11/12/2019) 453 6 g 1    ranitidine (ZANTAC) 150 mg tablet Take one tablet after dinner (Patient not taking: Reported on 11/12/2019) 90 tablet 1    triamcinolone (KENALOG) 0 1 % cream Apply two times a day to affected area for two weeks straight  Then Monday-Friday for 8 weeks  (Patient not taking: Reported on 11/12/2019) 453 6 g 3    triamcinolone (KENALOG) 0 1 % ointment Apply topically twice daily for 2 weeks, repeat as needed (Patient not taking: Reported on 11/12/2019) 453 6 g 3     No current facility-administered medications for this visit  No Known Allergies   Immunizations:     Immunization History   Administered Date(s) Administered    INFLUENZA 12/24/2015, 12/24/2015, 02/16/2018, 10/26/2018    Influenza Split 12/26/2012    Influenza Split High Dose Preservative Free IM 02/16/2018    Influenza, high dose seasonal 0 5 mL 10/26/2018    Pneumococcal Conjugate 13-Valent 05/29/2015    Pneumococcal Polysaccharide PPV23 11/13/2008    Tuberculin Skin Test-PPD Intradermal 07/13/2007    Zoster 10/27/2014      Health Maintenance:         Topic Date Due    CRC Screening: Colonoscopy  04/11/2021         Topic Date Due    DTaP,Tdap,and Td Vaccines (1 - Tdap) 10/26/1956    Influenza Vaccine  07/01/2019      Medicare Health Risk Assessment:     /62 (BP Location: Left arm, Patient Position: Sitting, Cuff Size: Large)   Pulse 76   Temp 99 °F (37 2 °C) (Tympanic)   Resp 16   Ht 5' 10 5" (1 791 m)   Wt 66 7 kg (147 lb 1 6 oz)   SpO2 98%   BMI 20 81 kg/m²      Quique Augustine is here for his Subsequent Wellness visit  Health Risk Assessment:   Patient rates overall health as good  Patient feels that their physical health rating is same  Eyesight was rated as same  Hearing was rated as same  Patient feels that their emotional and mental health rating is same   Pain experienced in the last 7 days has been none  Patient states that he has experienced no weight loss or gain in last 6 months  Depression Screening:   PHQ-2 Score: 0      Home Safety:  Patient does not have trouble with stairs inside or outside of their home  Patient has working smoke alarms and has working carbon monoxide detector  Home safety hazards include: none  Nutrition:   Current diet is Regular  Medications:   Patient is currently taking over-the-counter supplements  OTC medications include: see medication list  Patient is able to manage medications  Activities of Daily Living (ADLs)/Instrumental Activities of Daily Living (IADLs):   Walk and transfer into and out of bed and chair?: Yes  Dress and groom yourself?: Yes    Bathe or shower yourself?: Yes    Feed yourself?  Yes  Do your laundry/housekeeping?: Yes  Manage your money, pay your bills and track your expenses?: Yes  Make your own meals?: Yes    Do your own shopping?: Yes    Previous Hospitalizations:   Any hospitalizations or ED visits within the last 12 months?: No      Advance Care Planning:   Living will: Yes    Advanced directive: Yes      PREVENTIVE SCREENINGS      Cardiovascular Screening:    General: Screening Not Indicated and History Lipid Disorder      Diabetes Screening:     General: Screening Current      Colorectal Cancer Screening:     General: Screening Current      Prostate Cancer Screening:    General: History Prostate Cancer and Screening Not Indicated      Osteoporosis Screening:    General: Screening Not Indicated and History Osteoporosis      Ely Pool MD

## 2019-12-23 NOTE — PATIENT INSTRUCTIONS
Medicare Preventive Visit Patient Instructions  Thank you for completing your Welcome to Medicare Visit or Medicare Annual Wellness Visit today  Your next wellness visit will be due in one year (12/23/2020)  The screening/preventive services that you may require over the next 5-10 years are detailed below  Some tests may not apply to you based off risk factors and/or age  Screening tests ordered at today's visit but not completed yet may show as past due  Also, please note that scanned in results may not display below  Preventive Screenings:  Service Recommendations Previous Testing/Comments   Colorectal Cancer Screening  · Colonoscopy    · Fecal Occult Blood Test (FOBT)/Fecal Immunochemical Test (FIT)  · Fecal DNA/Cologuard Test  · Flexible Sigmoidoscopy Age: 54-65 years old   Colonoscopy: every 10 years (May be performed more frequently if at higher risk)  OR  FOBT/FIT: every 1 year  OR  Cologuard: every 3 years  OR  Sigmoidoscopy: every 5 years  Screening may be recommended earlier than age 48 if at higher risk for colorectal cancer  Also, an individualized decision between you and your healthcare provider will decide whether screening between the ages of 74-80 would be appropriate   Colonoscopy: 04/11/2011  FOBT/FIT: Not on file  Cologuard: Not on file  Sigmoidoscopy: Not on file    Screening Current     Prostate Cancer Screening Individualized decision between patient and health care provider in men between ages of 53-78   Medicare will cover every 12 months beginning on the day after your 50th birthday PSA: 0 3 ng/mL     History Prostate Cancer  Screening Not Indicated     Hepatitis C Screening Once for adults born between 1945 and 1965  More frequently in patients at high risk for Hepatitis C Hep C Antibody: Not on file       Diabetes Screening 1-2 times per year if you're at risk for diabetes or have pre-diabetes Fasting glucose: No results in last 5 years   A1C: No results in last 5 years    Screening Current   Cholesterol Screening Once every 5 years if you don't have a lipid disorder  May order more often based on risk factors  Lipid panel: 10/24/2018    Screening Not Indicated  History Lipid Disorder      Other Preventive Screenings Covered by Medicare:  1  Abdominal Aortic Aneurysm (AAA) Screening: covered once if your at risk  You're considered to be at risk if you have a family history of AAA or a male between the age of 73-68 who smoking at least 100 cigarettes in your lifetime  2  Lung Cancer Screening: covers low dose CT scan once per year if you meet all of the following conditions: (1) Age 50-69; (2) No signs or symptoms of lung cancer; (3) Current smoker or have quit smoking within the last 15 years; (4) You have a tobacco smoking history of at least 30 pack years (packs per day x number of years you smoked); (5) You get a written order from a healthcare provider  3  Glaucoma Screening: covered annually if you're considered high risk: (1) You have diabetes OR (2) Family history of glaucoma OR (3)  aged 48 and older OR (3)  American aged 72 and older  3  Osteoporosis Screening: covered every 2 years if you meet one of the following conditions: (1) Have a vertebral abnormality; (2) On glucocorticoid therapy for more than 3 months; (3) Have primary hyperparathyroidism; (4) On osteoporosis medications and need to assess response to drug therapy  5  HIV Screening: covered annually if you're between the age of 12-76  Also covered annually if you are younger than 13 and older than 72 with risk factors for HIV infection  For pregnant patients, it is covered up to 3 times per pregnancy      Immunizations:  Immunization Recommendations   Influenza Vaccine Annual influenza vaccination during flu season is recommended for all persons aged >= 6 months who do not have contraindications   Pneumococcal Vaccine (Prevnar and Pneumovax)  * Prevnar = PCV13  * Pneumovax = PPSV23 Adults 19-64 years old: 1-3 doses may be recommended based on certain risk factors  Adults 72 years old: Prevnar (PCV13) vaccine recommended followed by Pneumovax (PPSV23) vaccine  If already received PPSV23 since turning 65, then PCV13 recommended at least one year after PPSV23 dose  Hepatitis B Vaccine 3 dose series if at intermediate or high risk (ex: diabetes, end stage renal disease, liver disease)   Tetanus (Td) Vaccine - COST NOT COVERED BY MEDICARE PART B Following completion of primary series, a booster dose should be given every 10 years to maintain immunity against tetanus  Td may also be given as tetanus wound prophylaxis  Tdap Vaccine - COST NOT COVERED BY MEDICARE PART B Recommended at least once for all adults  For pregnant patients, recommended with each pregnancy  Shingles Vaccine (Shingrix) - COST NOT COVERED BY MEDICARE PART B  2 shot series recommended in those aged 48 and above     Health Maintenance Due:      Topic Date Due    CRC Screening: Colonoscopy  04/11/2021     Immunizations Due:      Topic Date Due    DTaP,Tdap,and Td Vaccines (1 - Tdap) 10/26/1956    Influenza Vaccine  07/01/2019     Advance Directives   What are advance directives? Advance directives are legal documents that state your wishes and plans for medical care  These plans are made ahead of time in case you lose your ability to make decisions for yourself  Advance directives can apply to any medical decision, such as the treatments you want, and if you want to donate organs  What are the types of advance directives? There are many types of advance directives, and each state has rules about how to use them  You may choose a combination of any of the following:  · Living will: This is a written record of the treatment you want  You can also choose which treatments you do not want, which to limit, and which to stop at a certain time  This includes surgery, medicine, IV fluid, and tube feedings     · Durable power of  for healthcare [facility]): This is a written record that states who you want to make healthcare choices for you when you are unable to make them for yourself  This person, called a proxy, is usually a family member or a friend  You may choose more than 1 proxy  · Do not resuscitate (DNR) order:  A DNR order is used in case your heart stops beating or you stop breathing  It is a request not to have certain forms of treatment, such as CPR  A DNR order may be included in other types of advance directives  · Medical directive: This covers the care that you want if you are in a coma, near death, or unable to make decisions for yourself  You can list the treatments you want for each condition  Treatment may include pain medicine, surgery, blood transfusions, dialysis, IV or tube feedings, and a ventilator (breathing machine)  · Values history: This document has questions about your views, beliefs, and how you feel and think about life  This information can help others choose the care that you would choose  Why are advance directives important? An advance directive helps you control your care  Although spoken wishes may be used, it is better to have your wishes written down  Spoken wishes can be misunderstood, or not followed  Treatments may be given even if you do not want them  An advance directive may make it easier for your family to make difficult choices about your care  © Copyright [employer] 2018 Information is for End User's use only and may not be sold, redistributed or otherwise used for commercial purposes  All illustrations and images included in CareNotes® are the copyrighted property of A D A M , Inc  or [employer] 32 RECOMMEND GETTING THE 2- DOSE SHINGLES VACCINE ([address] [place] [address] [address]) FROM YOUR PHARMACY  CHECK WITH THEM ON THE COST  YOU SHOULD HAVE THIS IF OVER AGE 48, EVEN IF YOU HAVE HAD THE ORIGINAL VACCINE (ZOSTRIX)       If you add more fiber to her diet it may improve the all were all bowel pattern  You could also use a little Metamucil or Citrucel every day with some extra fluids

## 2019-12-23 NOTE — PROGRESS NOTES
Assessment/Plan:    [facility] )  PSA stable        Diagnoses and all orders for this visit:    Immunization due  -     influenza vaccine, 0623-5961, high-dose, PF 0 5 mL (FLUZONE HIGH-DOSE)    Chronic coronary artery disease    Pruritus    Elevated PSA    Hyperlipidemia, unspecified hyperlipidemia type    Vitamin D deficiency    BPH with obstruction/lower urinary tract symptoms    [facility] ([facility] )    Other orders  -     nitroglycerin (NITROSTAT) 0 4 mg SL tablet; PLEASE SEE ATTACHED FOR DETAILED DIRECTIONS          Subjective:      Patient ID: Nubia Arredondo is a 66 y o  male  PATIENT RETURNS FOR FOLLOW-UP OF CHRONIC MEDICAL CONDITIONS  NO HOSPITAL STAYS OR EMERGENCY VISITS RECENTLY  MEDS WERE REVIEWED AND NO SIDE EFFECTS  NO NEW ISSUES  UNLESS NOTED BELOW  NO NEW MEDICAL PROVIDER REPORTED  THE CHRONIC DISEASES LISTED ABOVE ARE STABLE AND UNCHANGED/ THE PLAN OF CARE FOR THOSE WILL REMAIN UNCHANGED UNLESS NOTED BELOW       awv / sub           The following portions of the patient's history were reviewed and updated as appropriate: allergies, current medications, past family history, past medical history, past social history, past surgical history and problem list     Review of Systems   Constitutional: Negative for activity change and appetite change  HENT: Negative for trouble swallowing  Eyes: Negative for visual disturbance  Respiratory: Negative for cough and shortness of breath  Cardiovascular: Negative for chest pain, palpitations and leg swelling  Gastrointestinal: Negative for abdominal pain and blood in stool  Endocrine: Negative for polyuria  Genitourinary: Negative for difficulty urinating and hematuria  Skin: Negative for rash  Neurological: Negative for dizziness  Psychiatric/Behavioral: Negative for behavioral problems           Objective:  Vitals:    12/23/19 1211   BP: 100/62   BP Location: Left arm   Patient Position: Sitting   Cuff Size: Large   Pulse: 76   Resp: 16   Temp: 99 °F (37 2 °C)   TempSrc: Tympanic   SpO2: 98%   Weight: 66 7 kg (147 lb 1 6 oz)   Height: 5' 10 5" (1 791 m)      Physical Exam   Constitutional: He appears well-developed and well-nourished  HENT:   Head: Normocephalic and atraumatic  Eyes: Conjunctivae are normal    Neck: Neck supple  No thyromegaly present  Cardiovascular: Normal rate, regular rhythm, normal heart sounds and intact distal pulses  No murmur heard  Pulmonary/Chest: Effort normal and breath sounds normal  No respiratory distress  Musculoskeletal: He exhibits no edema  Lymphadenopathy:     He has no cervical adenopathy  Skin: Skin is warm and dry  Psychiatric: He has a normal mood and affect  His behavior is normal        Patient's chronic problems that were reviewed today are stable  Recent hospital stays reviewed  Recent labs and imaging reviewed  Recent visits to other providers reviewed  Meds reviewed and no changes made  Appropriate labs and imaging were ordered  Preventive measures appropriate for age and sex were reviewed with patient  Immunizations were updated as appropriate  Assessment and Plan:     Problem List Items Addressed This Visit        Cardiovascular and Mediastinum    Chronic coronary artery disease    Relevant Medications    nitroglycerin (NITROSTAT) 0 4 mg SL tablet       Musculoskeletal and Integument    Pruritus (Chronic)       Genitourinary    Adenocarcinoma of prostate (HCC)     PSA stable          RESOLVED: BPH with obstruction/lower urinary tract symptoms       Other    Hyperlipidemia    Vitamin D deficiency    RESOLVED: Elevated PSA      Other Visit Diagnoses     Immunization due    -  Primary           Preventive health issues were discussed with patient, and age appropriate screening tests were ordered as noted in patient's After Visit Summary    Personalized health advice and appropriate referrals for health education or preventive services given if needed, as noted in patient's After Visit Summary  History of Present Illness:     Patient presents for Medicare Annual Wellness visit    Patient Care Team:  Dave Pappas MD as PCP - General  MD Amber Tomlinson MD  [facility], MD Bello Regalado MD     Problem List:     Patient Active Problem List   Diagnosis    [facility])    Chronic coronary artery disease    Hyperlipidemia    Nuclear sclerosis of right eye    Osteoporosis    Lung field abnormal    Primary localized osteoarthritis of knees, bilateral    Vitamin D deficiency    Dermatitis    Pruritus    Subclinical hypothyroidism      Past Medical and Surgical History:     Past Medical History:   Diagnosis Date    Diverticulitis w/o hemorrhage 2008    Dyspepsia 2005    Nephrolithiasis     PVC (premature ventricular contraction) 03/30/2012     Past Surgical History:   Procedure Laterality Date    CATARACT EXTRACTION Right 06/2018    CATARACT EXTRACTION Left 08/30/2018    COLONOSCOPY W/ POLYPECTOMY  04/04/2006    PROSTATE BIOPSY  06/03/2016    Prostate Needle Biopsy     SHOULDER SURGERY Left     Arthrocentesis of the shoulder sub-=acronial bursa area    UMBILICAL HERNIA REPAIR      UPPER GASTROINTESTINAL ENDOSCOPY        Family History:     Family History   Problem Relation Age of Onset    Hypertension Mother     Stroke Mother     Diabetes Mother     Gout Father     Alzheimer's disease Father     Diabetes Family         Mellitus      Social History:     Social History     Socioeconomic History    Marital status:       Spouse name: None    Number of children: None    Years of education: None    Highest education level: None   Occupational History    None   Social Needs    Financial resource strain: None    Food insecurity:     Worry: None     Inability: None    Transportation needs:     Medical: None     Non-medical: None   Tobacco Use    Smoking status: Never Smoker    Smokeless tobacco: Never Used   Substance and Sexual Activity    Alcohol use: No    Drug use: No    Sexual activity: Never   Lifestyle    Physical activity:     Days per week: None     Minutes per session: None    Stress: None   Relationships    Social connections:     Talks on phone: None     Gets together: None     Attends [employer] service: None     Active member of club or organization: None     Attends meetings of clubs or organizations: None     Relationship status: None    Intimate partner violence:     Fear of current or ex partner: None     Emotionally abused: None     Physically abused: None     Forced sexual activity: None   Other Topics Concern    None   Social History Narrative    None       Medications and Allergies:     Current Outpatient Medications   Medication Sig Dispense Refill    aspirin 81 MG tablet Take 1 tablet by mouth daily      atorvastatin (LIPITOR) 10 mg tablet Take 10 mg by mouth daily        Cholecalciferol (VITAMIN D3) 2000 units capsule Take 1 capsule by mouth daily      digoxin (LANOXIN) 0 125 mg tablet Take 125 mcg by mouth daily        metoprolol succinate (TOPROL-XL) 25 mg 24 hr tablet 1/2 tab daily       nitroglycerin (NITROSTAT) 0 4 mg SL tablet PLEASE SEE ATTACHED FOR DETAILED DIRECTIONS  2    tamsulosin (FLOMAX) 0 4 mg Take 2 capsules (0 8 mg total) by mouth daily with dinner 180 capsule 3    hydrocortisone 2 5 % cream Apply no more than 1 time a day on Mondays through Fridays to trunk and buttocks  Do NOT use on face or groin  (Patient not taking: Reported on 11/12/2019) 453 6 g 1    ranitidine (ZANTAC) 150 mg tablet Take one tablet after dinner (Patient not taking: Reported on 11/12/2019) 90 tablet 1    triamcinolone (KENALOG) 0 1 % ointment Apply topically twice daily for 2 weeks, repeat as needed (Patient not taking: Reported on 11/12/2019) 453 6 g 3     No current facility-administered medications for this visit        No Known Allergies Immunizations:     Immunization History   Administered Date(s) Administered    INFLUENZA 12/24/2015, 12/24/2015, 02/16/2018, 10/26/2018    Influenza Split 12/26/2012    Influenza Split High Dose Preservative Free IM 02/16/2018    Influenza, high dose seasonal 0 5 mL 10/26/2018    Pneumococcal Conjugate 13-Valent 05/29/2015    Pneumococcal Polysaccharide PPV23 11/13/2008    Tuberculin Skin Test-PPD Intradermal 07/13/2007    Zoster 10/27/2014      Health Maintenance:         Topic Date Due    CRC Screening: Colonoscopy  04/11/2021         Topic Date Due    DTaP,Tdap,and Td Vaccines (1 - Tdap) 10/26/1954    Influenza Vaccine  07/01/2019      Medicare Health Risk Assessment:     /62 (BP Location: Left arm, Patient Position: Sitting, Cuff Size: Large)   Pulse 76   Temp 99 °F (37 2 °C) (Tympanic)   Resp 16   Ht 5' 10 5" (1 791 m)   Wt 66 7 kg (147 lb 1 6 oz)   SpO2 98%   BMI 20 81 kg/m²          Depression Screening:   PHQ-2 Score: 0      Previous Hospitalizations:   Any hospitalizations or ED visits within the last 12 months?: No      Advance Care Planning:   Living will: Yes    Durable POA for healthcare:  Yes    Advanced directive: Yes      Cognitive Screening:   Provider or family/friend/caregiver concerned regarding cognition?: No    PREVENTIVE SCREENINGS      Cardiovascular Screening:    General: Screening Not Indicated, History Lipid Disorder and Screening Current      Diabetes Screening:     General: Screening Current      Colorectal Cancer Screening:     General: Screening Current      Prostate Cancer Screening:    General: History Prostate Cancer and Screening Not Indicated      Osteoporosis Screening:    General: Screening Not Indicated and History Osteoporosis      Abdominal Aortic Aneurysm (AAA) Screening:        General: Screening Not Indicated      Lung Cancer Screening:     General: Screening Not Indicated      Hepatitis C Screening:    General: Screening Current      Ely Pool MD

## 2020-01-14 ENCOUNTER — OFFICE VISIT (OUTPATIENT)
Dept: DERMATOLOGY | Facility: CLINIC | Age: 75
End: 2020-01-14
Payer: MEDICARE

## 2020-01-14 VITALS — WEIGHT: 146 LBS | BODY MASS INDEX: 20.9 KG/M2 | HEIGHT: 70 IN

## 2020-01-14 DIAGNOSIS — L23.9 ALLERGIC CONTACT DERMATITIS, UNSPECIFIED TRIGGER: Primary | ICD-10-CM

## 2020-01-14 PROCEDURE — 95044 PATCH/APPLICATION TESTS: CPT | Performed by: DERMATOLOGY

## 2020-01-14 NOTE — PROGRESS NOTES
[facility] Follow Up Note    Patient Name: Ernesto Hsieh  Encounter Date: 1/14/2020    Today's Chief Concerns:  Sara Booth Concern #1:  Patch test day 1    Current Medications:    Current Outpatient Medications:     aspirin 81 MG tablet, Take 1 tablet by mouth daily, Disp: , Rfl:     atorvastatin (LIPITOR) 10 mg tablet, Take 10 mg by mouth daily  , Disp: , Rfl:     Cholecalciferol (VITAMIN D3) 2000 units capsule, Take 1 capsule by mouth daily, Disp: , Rfl:     digoxin (LANOXIN) 0 125 mg tablet, Take 125 mcg by mouth daily  , Disp: , Rfl:     hydrocortisone 2 5 % cream, Apply no more than 1 time a day on Mondays through Fridays to trunk and buttocks  Do NOT use on face or groin  (Patient not taking: Reported on 11/12/2019), Disp: 453 6 g, Rfl: 1    metoprolol succinate (TOPROL-XL) 25 mg 24 hr tablet, 1/2 tab daily , Disp: , Rfl:     nitroglycerin (NITROSTAT) 0 4 mg SL tablet, PLEASE SEE ATTACHED FOR DETAILED DIRECTIONS, Disp: , Rfl: 2    ranitidine (ZANTAC) 150 mg tablet, Take one tablet after dinner (Patient not taking: Reported on 11/12/2019), Disp: 90 tablet, Rfl: 1    tamsulosin (FLOMAX) 0 4 mg, Take 2 capsules (0 8 mg total) by mouth daily with dinner, Disp: 180 capsule, Rfl: 3    triamcinolone (KENALOG) 0 1 % ointment, Apply topically twice daily for 2 weeks, repeat as needed (Patient not taking: Reported on 11/12/2019), Disp: 453 6 g, Rfl: 3    CONSTITUTIONAL:   Vitals:    01/14/20 1353   Weight: 66 2 kg (146 lb)   Height: 5' 10" (1 778 m)           Specific Alerts:    Have you been seen by a [facility] in the last 3 years? YES    Are you pregnant or planning to become pregnant? N/A    Are you currently or planning to be nursing or breast feeding? N/A    No Known Allergies    May we call your Preferred Phone number to discuss your specific medical information? YES    May we leave a detailed message that includes your specific medical information?  YES    Have you traveled outside of the [place] in the past 3 months? No    Do you currently have a pacemaker or defibrillator? No    Do you have any artificial heart valves, joints, plates, screws, rods, stents, pins, etc? No   - If Yes, were any placed within the last 2 years? Do you require any medications prior to a surgical procedure? No   - If Yes, for which procedure? - If Yes, what medications to you require? Are you taking any medications that cause you to bleed more easily ("blood thinners") No    Have you ever experienced a rapid heartbeat with epinephrine? No    Have you ever been treated with "gold" (gold sodium thiomalate) therapy? No    [facility] can help with wrinkles, "laugh lines," facial volume loss, "double chin," "love handles," age spots, and more  Are you interested in learning today about some of the skin enhancement procedures that we offer? (If Yes, please provide more detail) No    Review of Systems:  Have you recently had or currently have any of the following?     · Fever or chills: No  · Night Sweats: No  · Headaches: No  · Weight Gain: No  · Weight Loss: No  · Blurry Vision: No  · Nausea: No  · Vomiting: No  · Diarrhea: No  · Blood in Stool: No  · Abdominal Pain: No  · Itchy Skin: YES  · Painful Joints: No  · Swollen Joints: No  · Muscle Pain: No  · Irregular Mole: No  · Sun Burn: No  · Dry Skin: No  · Skin Color Changes: YES  · Scar or Keloid: No  · Cold Sores/Fever Blisters: No  · Bacterial Infections/MRSA: No  · Anxiety: No  · Depression: No  · Suicidal or Homicidal Thoughts: No      PSYCH: Normal mood and affect  EYES: Normal conjunctiva  ENT: Normal lips and oral mucosa  CARDIOVASCULAR: No edema  RESPIRATORY: Normal respirations  HEME/LYMPH/IMMUNO:  No regional lymphadenopathy except as noted below in ASSESSMENT AND PLAN BY DIAGNOSIS    FULL ORGAN SYSTEM SKIN EXAM (SKIN)  Hair, Scalp, Ears, Face Normal except as noted below in Assessment   Neck, Cervical Chain Nodes Normal except as noted below in Assessment   Right Arm/Hand/Fingers Normal except as noted below in Assessment   Left Arm/Hand/Fingers Normal except as noted below in Assessment           Back/Spine Normal except as noted below in Assessment                   1  PATCH TEST DAY 1    Assessment and Plan:   Area of body affected: Trunk and extremities   Prior treatment: Triamcinolone, Hydrocortisone    Indication for patch test: Generalized rash    Plan is to patch test using T R U E Test   Allergic contact dermatitis patch testing was explained to the patient  The patient understands that this testing is only a test, not a treatment  Therefore, avoidance of any allergen is the key to improvement of the eruption if an allergy is found  Additionally, the patient understands that there is a possibility of a negative test as there are over 4,300 known allergens and it is impossible to test for everything so we will test for the more common causes that can cause this pattern of pruritis   Recommended no showering, sweating or excessive moisture as this reduces the effectiveness of the test   Also, no oral steroids and do not apply topical steroids to the testing field   The patient understands that they must come to the clinic on Monday to have the patches placed, Wednesday to have the patches removed and an initial read performed,  and Friday of the testing week for the final reading  PROCEDURE: PATCH TEST APPLICATION    Total number of patches applied: 36 individual patches    The first panel was placed on the upper left side of patient's back with 1 3 marked in the upper left corner  The entire patch was smoothed, making sure each chamber made adequate contact with the skin  Grid 1 3 was placed over applied panel, a surgical marker was used to appropriately crystal notches on skin  Hypafix was applied over patches  This was repeated for patches 1 2 and 1 3          PATIENT INSTRUCTIONS     After your patch tests are applied, you will need to return in two days (48 hours) to have your patch-test sites read  The appointments should have been made at the time your initial appointment was scheduled  Please do not take a bath or get your back wet until after you have completed all your patch test visits  Do not get your back wet between the time the patch tests are removed and the time of your final visit  Please do not take part in any strenuous activities that will loosen the tape on your back or cause you to perspire heavily  If the tape becomes loose, it may be reinforced with a medical tape from your home  If one or more patches hurt or become very itchy (more so than the others), they may be cut out and brought in separately  Ordinarily, this is not necessary  Leave the patches alone if you feel a generalized itch from the tape and cannot pinpoint exactly which patch is causing the discomfort  You may take an antihistamine for the itching, such as Benadryl  You may want to wear an OLD undershirt to prevent the adhesives and/or patch test substances from sticking to or staining your clothes during and after the patch tests are removed  Please call the patch test nurse at: [phone] to report any reactions occurring after your final patch test appointment        Scribe Attestation    I,:   Ju Rios RN am acting as a scribe while in the presence of the attending physician :        I,:   Gael Dueñas MD personally performed the services described in this documentation    as scribed in my presence :

## 2020-01-14 NOTE — PATIENT INSTRUCTIONS
PATIENT INSTRUCTIONS     After your patch tests are applied, you will need to return in two days (48 hours) to have your patch-test sites read  The appointments should have been made at the time your initial appointment was scheduled  Please do not take a bath or get your back wet until after you have completed all your patch test visits  Do not get your back wet between the time the patch tests are removed and the time of your final visit  Please do not take part in any strenuous activities that will loosen the tape on your back or cause you to perspire heavily  If the tape becomes loose, it may be reinforced with a medical tape from your home  If one or more patches hurt or become very itchy (more so than the others), they may be cut out and brought in separately  Ordinarily, this is not necessary  Leave the patches alone if you feel a generalized itch from the tape and cannot pinpoint exactly which patch is causing the discomfort  You may take an antihistamine for the itching, such as Benadryl  You may want to wear an OLD undershirt to prevent the adhesives and/or patch test substances from sticking to or staining your clothes during and after the patch tests are removed  Please call the patch test nurse at: [phone] to report any reactions occurring after your final patch test appointment

## 2020-01-16 ENCOUNTER — OFFICE VISIT (OUTPATIENT)
Dept: DERMATOLOGY | Facility: CLINIC | Age: 75
End: 2020-01-16
Payer: MEDICARE

## 2020-01-16 DIAGNOSIS — L25.9 CONTACT DERMATITIS, UNSPECIFIED CONTACT DERMATITIS TYPE, UNSPECIFIED TRIGGER: Primary | ICD-10-CM

## 2020-01-16 PROCEDURE — 99213 OFFICE O/P EST LOW 20 MIN: CPT | Performed by: DERMATOLOGY

## 2020-01-16 NOTE — PROGRESS NOTES
[facility] Follow Up Note    Patient Name: Tisha Soriano  Encounter Date: 1/16/2020    Today's Chief Concerns:   [facility] Concern #1:  Patch test Day     Current Medications:    Current Outpatient Medications:     aspirin 81 MG tablet, Take 1 tablet by mouth daily, Disp: , Rfl:     atorvastatin (LIPITOR) 10 mg tablet, Take 10 mg by mouth daily  , Disp: , Rfl:     Cholecalciferol (VITAMIN D3) 2000 units capsule, Take 1 capsule by mouth daily, Disp: , Rfl:     digoxin (LANOXIN) 0 125 mg tablet, Take 125 mcg by mouth daily  , Disp: , Rfl:     hydrocortisone 2 5 % cream, Apply no more than 1 time a day on Mondays through Fridays to trunk and buttocks  Do NOT use on face or groin  (Patient not taking: Reported on 11/12/2019), Disp: 453 6 g, Rfl: 1    metoprolol succinate (TOPROL-XL) 25 mg 24 hr tablet, 1/2 tab daily , Disp: , Rfl:     nitroglycerin (NITROSTAT) 0 4 mg SL tablet, PLEASE SEE ATTACHED FOR DETAILED DIRECTIONS, Disp: , Rfl: 2    ranitidine (ZANTAC) 150 mg tablet, Take one tablet after dinner (Patient not taking: Reported on 11/12/2019), Disp: 90 tablet, Rfl: 1    tamsulosin (FLOMAX) 0 4 mg, Take 2 capsules (0 8 mg total) by mouth daily with dinner, Disp: 180 capsule, Rfl: 3    triamcinolone (KENALOG) 0 1 % ointment, Apply topically twice daily for 2 weeks, repeat as needed (Patient not taking: Reported on 11/12/2019), Disp: 453 6 g, Rfl: 3    CONSTITUTIONAL:   There were no vitals filed for this visit  Specific Alerts:    Have you been seen by a [facility]ologist in the last 3 years? YES    Are you pregnant or planning to become pregnant? N/A    Are you currently or planning to be nursing or breast feeding? N/A    No Known Allergies    May we call your Preferred Phone number to discuss your specific medical information? YES    May we leave a detailed message that includes your specific medical information?  YES    Have you traveled outside of the [place] in the past 3 months? No    Do you currently have a pacemaker or defibrillator? No    Do you have any artificial heart valves, joints, plates, screws, rods, stents, pins, etc? No   - If Yes, were any placed within the last 2 years? Do you require any medications prior to a surgical procedure? No   - If Yes, for which procedure? - If Yes, what medications to you require? Are you taking any medications that cause you to bleed more easily ("blood thinners") No    Have you ever experienced a rapid heartbeat with epinephrine? No    Have you ever been treated with "gold" (gold sodium thiomalate) therapy? No    [facility] can help with wrinkles, "laugh lines," facial volume loss, "double chin," "love handles," age spots, and more  Are you interested in learning today about some of the skin enhancement procedures that we offer? (If Yes, please provide more detail) No    Review of Systems:  Have you recently had or currently have any of the following?     · Fever or chills: No  · Night Sweats: No  · Headaches: No  · Weight Gain: No  · Weight Loss: No  · Blurry Vision: No  · Nausea: No  · Vomiting: No  · Diarrhea: No  · Blood in Stool: No  · Abdominal Pain: No  · Itchy Skin: YES  · Painful Joints: No  · Swollen Joints: No  · Muscle Pain: No  · Irregular Mole: No  · Sun Burn: No  · Dry Skin: YES  · Skin Color Changes: No  · Scar or Keloid: No  · Cold Sores/Fever Blisters: No  · Bacterial Infections/MRSA: No  · Anxiety: No  · Depression: No  · Suicidal or Homicidal Thoughts: No      PSYCH: Normal mood and affect  EYES: Normal conjunctiva  ENT: Normal lips and oral mucosa  CARDIOVASCULAR: No edema  RESPIRATORY: Normal respirations  HEME/LYMPH/IMMUNO:  No regional lymphadenopathy except as noted below in ASSESSMENT AND PLAN BY DIAGNOSIS    FULL ORGAN SYSTEM SKIN EXAM (SKIN)                          Back/Spine Normal except as noted below in Assessment                   1  PATCH TEST DAY 2: NURSE VISIT ONLY    Physical Exam   Observation that tape stayed on correctly: YES   Itching or burning where allergens were placed: YES      Additional History of Present Condition:      Assessment and Plan:  Based on a thorough discussion of this condition and the management approach to it (including a comprehensive discussion of the known risks, side effects and potential benefits of treatment), the patient (family) agrees to implement the following specific plan:   Patches and tape were removed, marking were identified and darkened with surgical marking pen, all patient questions were answered   o Carba Mix and Gold showed possible slight reaction    Scribe Attestation    I,:   Jonathan Roland RN am acting as a scribe while in the presence of the attending physician :        I,:   Arielle Richter MD personally performed the services described in this documentation    as scribed in my presence :

## 2020-01-20 ENCOUNTER — OFFICE VISIT (OUTPATIENT)
Dept: DERMATOLOGY | Facility: CLINIC | Age: 75
End: 2020-01-20
Payer: MEDICARE

## 2020-01-20 VITALS — BODY MASS INDEX: 20.93 KG/M2 | HEIGHT: 70 IN | WEIGHT: 146.16 LBS

## 2020-01-20 DIAGNOSIS — L23.5 ALLERGIC DERMATITIS DUE TO OTHER CHEMICAL PRODUCT: Primary | ICD-10-CM

## 2020-01-20 PROCEDURE — 99213 OFFICE O/P EST LOW 20 MIN: CPT | Performed by: DERMATOLOGY

## 2020-01-20 NOTE — PROGRESS NOTES
[facility] Follow Up Note    Patient Name: Anup Murdock  Encounter Date: 1/20/2020    Today's Chief Concerns:  [facility] Concern #1:  Patch test day 3    Current Medications:    Current Outpatient Medications:     aspirin 81 MG tablet, Take 1 tablet by mouth daily, Disp: , Rfl:     atorvastatin (LIPITOR) 10 mg tablet, Take 10 mg by mouth daily  , Disp: , Rfl:     Cholecalciferol (VITAMIN D3) 2000 units capsule, Take 1 capsule by mouth daily, Disp: , Rfl:     digoxin (LANOXIN) 0 125 mg tablet, Take 125 mcg by mouth daily  , Disp: , Rfl:     hydrocortisone 2 5 % cream, Apply no more than 1 time a day on Mondays through Fridays to trunk and buttocks  Do NOT use on face or groin   (Patient not taking: Reported on 11/12/2019), Disp: 453 6 g, Rfl: 1    metoprolol succinate (TOPROL-XL) 25 mg 24 hr tablet, 1/2 tab daily , Disp: , Rfl:     nitroglycerin (NITROSTAT) 0 4 mg SL tablet, PLEASE SEE ATTACHED FOR DETAILED DIRECTIONS, Disp: , Rfl: 2    ranitidine (ZANTAC) 150 mg tablet, Take one tablet after dinner (Patient not taking: Reported on 11/12/2019), Disp: 90 tablet, Rfl: 1    tamsulosin (FLOMAX) 0 4 mg, Take 2 capsules (0 8 mg total) by mouth daily with dinner, Disp: 180 capsule, Rfl: 3    triamcinolone (KENALOG) 0 1 % ointment, Apply topically twice daily for 2 weeks, repeat as needed (Patient not taking: Reported on 11/12/2019), Disp: 453 6 g, Rfl: 3    CONSTITUTIONAL:   Vitals:    01/20/20 1511   Weight: 66 3 kg (146 lb 2 6 oz)   Height: 5' 10" (1 778 m)       PSYCH: Normal mood and affect  EYES: Normal conjunctiva  ENT: Normal lips and oral mucosa  CARDIOVASCULAR: No edema  RESPIRATORY: Normal respirations  HEME/LYMPH/IMMUNO:  No regional lymphadenopathy except as noted below in ASSESSMENT AND PLAN BY DIAGNOSIS    FULL   Back/Spine Normal except as noted below in Assessment                   1  PATCH TEST DAY 3      Patch Positive  During this previous week, the patient was patch tested to the TRUE TEST  Upon review, the patient had positive reactions to:     o Cell Number 8: Paraben Mix: +1   o Cell Number 28: Gold: +1      Assessment and Plan:    Pt to continue with current regime of moisturizer and topical steroid for flares     Hand outs of positive allergens given to patient for reference  All questions were answered  Pt should follow up as needed   Added both to allergies in EMR     We explained the interpretation of the results to the patient and provided the patient with a semi-comprehensive list of appropriate products that they could use, while avoiding the allergens most effectively  Once again, we explained that the patch testing was only a test, and that the best outcome for the patient, would happen only if they adhered to the results found today  Patient was provided information sheets regarding the common and not so common locations of each positive allergen, which should be avoided  Any products that goes on patient's skin should be carefully reviewed, and if the chemicals or their potential cross-reactors (listed) are present, then these products should not be used  Patch Negative    During this previous week, the patient was patch tested to the [employer] D[facility]atitis standard series  Upon review, the patient had no positive reactions  We explained the interpretation of the results and that patient does not appear to be allergic to the products tested  Additionally, the patient understands that there is a possibility of a negative test as there are over 4,300 known allergens and it is impossible to test for everything but testing was performed for the more common causes that can cause this pattern of pruritis      Scribe Attestation    I,:   Lemuel Ambrose RN am acting as a scribe while in the presence of the attending physician :        I,:   Marcela Del Real MD personally performed the services described in this documentation    as scribed in my presence :

## 2020-01-20 NOTE — PATIENT INSTRUCTIONS
1  PATCH TEST DAY 3      Patch Positive  During this previous week, the patient was patch tested to the TRUE TEST  Upon review, the patient had positive reactions to:     o Cell Number 8: Paraben Mix: +1   o Cell Number 28: Gold: +1      Assessment and Plan:    Pt to continue with current regime of moisturizer and topical steroid for flares     Hand outs of positive allergens given to patient for reference  All questions were answered  Pt should follow up as needed  We explained the interpretation of the results to the patient and provided the patient with a semi-comprehensive list of appropriate products that they could use, while avoiding the allergens most effectively  Once again, we explained that the patch testing was only a test, and that the best outcome for the patient, would happen only if they adhered to the results found today  Patient was provided information sheets regarding the common and not so common locations of each positive allergen, which should be avoided  Any products that goes on patient's skin should be carefully reviewed, and if the chemicals or their potential cross-reactors (listed) are present, then these products should not be used

## 2020-02-13 ENCOUNTER — APPOINTMENT (OUTPATIENT)
Dept: LAB | Facility: HOSPITAL | Age: 75
End: 2020-02-13
Attending: INTERNAL MEDICINE
Payer: MEDICARE

## 2020-02-13 ENCOUNTER — TELEPHONE (OUTPATIENT)
Dept: CARDIOLOGY CLINIC | Facility: CLINIC | Age: 75
End: 2020-02-13

## 2020-02-13 DIAGNOSIS — R94.39 ABNORMAL STRESS TEST: Primary | ICD-10-CM

## 2020-02-13 LAB
ANION GAP SERPL CALCULATED.3IONS-SCNC: 6 MMOL/L (ref 4–13)
BASOPHILS # BLD AUTO: 0.04 THOUSANDS/ΜL (ref 0–0.1)
BASOPHILS NFR BLD AUTO: 1 % (ref 0–1)
BUN SERPL-MCNC: 21 MG/DL (ref 5–25)
CALCIUM SERPL-MCNC: 8.8 MG/DL (ref 8.3–10.1)
CHLORIDE SERPL-SCNC: 105 MMOL/L (ref 100–108)
CO2 SERPL-SCNC: 30 MMOL/L (ref 21–32)
CREAT SERPL-MCNC: 1.12 MG/DL (ref 0.6–1.3)
EOSINOPHIL # BLD AUTO: 0.24 THOUSAND/ΜL (ref 0–0.61)
EOSINOPHIL NFR BLD AUTO: 4 % (ref 0–6)
ERYTHROCYTE [DISTWIDTH] IN BLOOD BY AUTOMATED COUNT: 13 % (ref 11.6–15.1)
GFR SERPL CREATININE-BSD FRML MDRD: 63 ML/MIN/1.73SQ M
GLUCOSE P FAST SERPL-MCNC: 101 MG/DL (ref 65–99)
HCT VFR BLD AUTO: 38.2 % (ref 36.5–49.3)
HGB BLD-MCNC: 12.4 G/DL (ref 12–17)
IMM GRANULOCYTES # BLD AUTO: 0.03 THOUSAND/UL (ref 0–0.2)
IMM GRANULOCYTES NFR BLD AUTO: 1 % (ref 0–2)
LYMPHOCYTES # BLD AUTO: 1.07 THOUSANDS/ΜL (ref 0.6–4.47)
LYMPHOCYTES NFR BLD AUTO: 20 % (ref 14–44)
MCH RBC QN AUTO: 29.5 PG (ref 26.8–34.3)
MCHC RBC AUTO-ENTMCNC: 32.5 G/DL (ref 31.4–37.4)
MCV RBC AUTO: 91 FL (ref 82–98)
MONOCYTES # BLD AUTO: 0.46 THOUSAND/ΜL (ref 0.17–1.22)
MONOCYTES NFR BLD AUTO: 9 % (ref 4–12)
NEUTROPHILS # BLD AUTO: 3.57 THOUSANDS/ΜL (ref 1.85–7.62)
NEUTS SEG NFR BLD AUTO: 65 % (ref 43–75)
NRBC BLD AUTO-RTO: 0 /100 WBCS
PLATELET # BLD AUTO: 175 THOUSANDS/UL (ref 149–390)
PMV BLD AUTO: 9.6 FL (ref 8.9–12.7)
POTASSIUM SERPL-SCNC: 4.2 MMOL/L (ref 3.5–5.3)
RBC # BLD AUTO: 4.2 MILLION/UL (ref 3.88–5.62)
SODIUM SERPL-SCNC: 141 MMOL/L (ref 136–145)
WBC # BLD AUTO: 5.41 THOUSAND/UL (ref 4.31–10.16)

## 2020-02-13 PROCEDURE — 85025 COMPLETE CBC W/AUTO DIFF WBC: CPT

## 2020-02-13 PROCEDURE — 80048 BASIC METABOLIC PNL TOTAL CA: CPT

## 2020-02-13 PROCEDURE — 36415 COLL VENOUS BLD VENIPUNCTURE: CPT

## 2020-02-13 NOTE — TELEPHONE ENCOUNTER
Patient schedule for LHC at [facility] on 02/17/2020 will be perform by Dr Lorenzo Alcazar  Patient aware of general instructions and blood test required for the procedure  Patient cover under medicare A+B

## 2020-02-14 PROBLEM — R94.39 ABNORMAL NUCLEAR STRESS TEST: Chronic | Status: ACTIVE | Noted: 2020-02-14

## 2020-02-14 RX ORDER — ASPIRIN 81 MG/1
324 TABLET, CHEWABLE ORAL ONCE
Status: CANCELLED | OUTPATIENT
Start: 2020-02-14 | End: 2020-02-14

## 2020-02-14 RX ORDER — SODIUM CHLORIDE 9 MG/ML
125 INJECTION, SOLUTION INTRAVENOUS CONTINUOUS
Status: CANCELLED | OUTPATIENT
Start: 2020-02-14

## 2020-02-16 ENCOUNTER — TELEPHONE (OUTPATIENT)
Dept: INPATIENT UNIT | Facility: HOSPITAL | Age: 75
End: 2020-02-16

## 2020-02-17 ENCOUNTER — HOSPITAL ENCOUNTER (OUTPATIENT)
Dept: NON INVASIVE DIAGNOSTICS | Facility: HOSPITAL | Age: 75
Discharge: HOME | End: 2020-02-17
Attending: INTERNAL MEDICINE | Admitting: INTERNAL MEDICINE
Payer: MEDICARE

## 2020-02-17 VITALS
OXYGEN SATURATION: 97 % | RESPIRATION RATE: 18 BRPM | SYSTOLIC BLOOD PRESSURE: 127 MMHG | HEART RATE: 68 BPM | TEMPERATURE: 97.9 F | DIASTOLIC BLOOD PRESSURE: 65 MMHG

## 2020-02-17 DIAGNOSIS — R94.39 ABNORMAL STRESS TEST: ICD-10-CM

## 2020-02-17 LAB
ATRIAL RATE: 58 BPM
CHOLEST SERPL-MCNC: 87 MG/DL (ref 50–200)
HDLC SERPL-MCNC: 35 MG/DL
LDLC SERPL CALC-MCNC: 42 MG/DL (ref 0–100)
NONHDLC SERPL-MCNC: 52 MG/DL
P AXIS: 51 DEGREES
PR INTERVAL: 140 MS
QRS AXIS: -3 DEGREES
QRSD INTERVAL: 126 MS
QT INTERVAL: 436 MS
QTC INTERVAL: 428 MS
T WAVE AXIS: 9 DEGREES
TRIGL SERPL-MCNC: 49 MG/DL
VENTRICULAR RATE: 58 BPM

## 2020-02-17 PROCEDURE — 99153 MOD SED SAME PHYS/QHP EA: CPT | Performed by: INTERNAL MEDICINE

## 2020-02-17 PROCEDURE — NC001 PR NO CHARGE: Performed by: STUDENT IN AN ORGANIZED HEALTH CARE EDUCATION/TRAINING PROGRAM

## 2020-02-17 PROCEDURE — 93571 IV DOP VEL&/PRESS C FLO 1ST: CPT | Performed by: INTERNAL MEDICINE

## 2020-02-17 PROCEDURE — C1769 GUIDE WIRE: HCPCS | Performed by: INTERNAL MEDICINE

## 2020-02-17 PROCEDURE — 99152 MOD SED SAME PHYS/QHP 5/>YRS: CPT | Performed by: INTERNAL MEDICINE

## 2020-02-17 PROCEDURE — C1894 INTRO/SHEATH, NON-LASER: HCPCS | Performed by: INTERNAL MEDICINE

## 2020-02-17 PROCEDURE — 93458 L HRT ARTERY/VENTRICLE ANGIO: CPT | Performed by: INTERNAL MEDICINE

## 2020-02-17 PROCEDURE — 80061 LIPID PANEL: CPT | Performed by: INTERNAL MEDICINE

## 2020-02-17 PROCEDURE — 93010 ELECTROCARDIOGRAM REPORT: CPT | Performed by: INTERNAL MEDICINE

## 2020-02-17 PROCEDURE — 93005 ELECTROCARDIOGRAM TRACING: CPT

## 2020-02-17 PROCEDURE — C1887 CATHETER, GUIDING: HCPCS | Performed by: INTERNAL MEDICINE

## 2020-02-17 RX ORDER — SODIUM CHLORIDE 9 MG/ML
125 INJECTION, SOLUTION INTRAVENOUS CONTINUOUS
Status: DISCONTINUED | OUTPATIENT
Start: 2020-02-17 | End: 2020-02-17

## 2020-02-17 RX ORDER — ACETAMINOPHEN 325 MG/1
650 TABLET ORAL EVERY 4 HOURS PRN
Status: DISCONTINUED | OUTPATIENT
Start: 2020-02-17 | End: 2020-02-17 | Stop reason: HOSPADM

## 2020-02-17 RX ORDER — HEPARIN SODIUM 1000 [USP'U]/ML
INJECTION, SOLUTION INTRAVENOUS; SUBCUTANEOUS CODE/TRAUMA/SEDATION MEDICATION
Status: COMPLETED | OUTPATIENT
Start: 2020-02-17 | End: 2020-02-17

## 2020-02-17 RX ORDER — ASPIRIN 81 MG/1
324 TABLET, CHEWABLE ORAL ONCE
Status: COMPLETED | OUTPATIENT
Start: 2020-02-17 | End: 2020-02-17

## 2020-02-17 RX ORDER — SODIUM CHLORIDE 9 MG/ML
100 INJECTION, SOLUTION INTRAVENOUS CONTINUOUS
Status: CANCELLED | OUTPATIENT
Start: 2020-02-17 | End: 2020-02-17

## 2020-02-17 RX ORDER — VERAPAMIL HYDROCHLORIDE 2.5 MG/ML
INJECTION, SOLUTION INTRAVENOUS CODE/TRAUMA/SEDATION MEDICATION
Status: COMPLETED | OUTPATIENT
Start: 2020-02-17 | End: 2020-02-17

## 2020-02-17 RX ORDER — NITROGLYCERIN 20 MG/100ML
INJECTION INTRAVENOUS CODE/TRAUMA/SEDATION MEDICATION
Status: COMPLETED | OUTPATIENT
Start: 2020-02-17 | End: 2020-02-17

## 2020-02-17 RX ORDER — FENTANYL CITRATE 50 UG/ML
INJECTION, SOLUTION INTRAMUSCULAR; INTRAVENOUS CODE/TRAUMA/SEDATION MEDICATION
Status: COMPLETED | OUTPATIENT
Start: 2020-02-17 | End: 2020-02-17

## 2020-02-17 RX ORDER — ONDANSETRON 2 MG/ML
4 INJECTION INTRAMUSCULAR; INTRAVENOUS EVERY 6 HOURS PRN
Status: DISCONTINUED | OUTPATIENT
Start: 2020-02-17 | End: 2020-02-17 | Stop reason: HOSPADM

## 2020-02-17 RX ORDER — SODIUM CHLORIDE 9 MG/ML
150 INJECTION, SOLUTION INTRAVENOUS CONTINUOUS
Status: DISPENSED | OUTPATIENT
Start: 2020-02-17 | End: 2020-02-17

## 2020-02-17 RX ORDER — MIDAZOLAM HYDROCHLORIDE 2 MG/2ML
INJECTION, SOLUTION INTRAMUSCULAR; INTRAVENOUS CODE/TRAUMA/SEDATION MEDICATION
Status: COMPLETED | OUTPATIENT
Start: 2020-02-17 | End: 2020-02-17

## 2020-02-17 RX ORDER — LIDOCAINE HYDROCHLORIDE 10 MG/ML
INJECTION, SOLUTION EPIDURAL; INFILTRATION; INTRACAUDAL; PERINEURAL CODE/TRAUMA/SEDATION MEDICATION
Status: COMPLETED | OUTPATIENT
Start: 2020-02-17 | End: 2020-02-17

## 2020-02-17 RX ADMIN — ASPIRIN 81 MG 324 MG: 81 TABLET ORAL at 10:33

## 2020-02-17 RX ADMIN — Medication 200 MCG: at 15:03

## 2020-02-17 RX ADMIN — MIDAZOLAM 2 MG: 1 INJECTION INTRAMUSCULAR; INTRAVENOUS at 14:56

## 2020-02-17 RX ADMIN — Medication 200 MCG: at 15:28

## 2020-02-17 RX ADMIN — FENTANYL CITRATE 50 MCG: 50 INJECTION, SOLUTION INTRAMUSCULAR; INTRAVENOUS at 15:04

## 2020-02-17 RX ADMIN — LIDOCAINE HYDROCHLORIDE 1 ML: 10 INJECTION, SOLUTION EPIDURAL; INFILTRATION; INTRACAUDAL; PERINEURAL at 14:57

## 2020-02-17 RX ADMIN — IOHEXOL 100 ML: 350 INJECTION, SOLUTION INTRAVENOUS at 15:24

## 2020-02-17 RX ADMIN — FENTANYL CITRATE 50 MCG: 50 INJECTION, SOLUTION INTRAMUSCULAR; INTRAVENOUS at 14:56

## 2020-02-17 RX ADMIN — IOHEXOL 30 ML: 350 INJECTION, SOLUTION INTRAVENOUS at 15:37

## 2020-02-17 RX ADMIN — HEPARIN SODIUM 4000 UNITS: 1000 INJECTION INTRAVENOUS; SUBCUTANEOUS at 15:13

## 2020-02-17 RX ADMIN — HEPARIN SODIUM 4000 UNITS: 1000 INJECTION INTRAVENOUS; SUBCUTANEOUS at 15:05

## 2020-02-17 RX ADMIN — SODIUM CHLORIDE 125 ML/HR: 0.9 INJECTION, SOLUTION INTRAVENOUS at 10:30

## 2020-02-17 RX ADMIN — MIDAZOLAM 1 MG: 1 INJECTION INTRAMUSCULAR; INTRAVENOUS at 15:01

## 2020-02-17 RX ADMIN — IOHEXOL 36 ML: 350 INJECTION, SOLUTION INTRAVENOUS at 15:37

## 2020-02-17 RX ADMIN — VERAPAMIL HYDROCHLORIDE 2.5 MG: 2.5 INJECTION INTRAVENOUS at 15:05

## 2020-02-17 NOTE — H&P
Interventional Cardiology   Tray Sahni 66 y o  male MRN: [id number]  Unit/Bed#: [facility] 213-02 Encounter: [id number]      PCP: Dannielle Love MD      Assessment/Plan     Assessment/Plan:  1  Progressive MIRANDA with abnormal stress test    2  Coronary disease with history of LAD PCI  3  Hypertension    Plan for coronary angiography  History of Present Illness     HPI:  Tray Sahni is a 66 y o  male who presents for elective coronary angiography  The patient has several month history of dyspnea on exertion, was found to have abnormal stress test  The patient states his symptoms have progressively been worsening  The patient denies any other acute complaints of chest pain, HA,N,V, abd pain, blood per urine or stools               Historical Information   Past Medical History:   Diagnosis Date    Diverticulitis w/o hemorrhage 2008    Dyspepsia 2005    Nephrolithiasis     PVC (premature ventricular contraction) 03/30/2012     Past Surgical History:   Procedure Laterality Date    CATARACT EXTRACTION Right 06/2018    CATARACT EXTRACTION Left 08/30/2018    COLONOSCOPY W/ POLYPECTOMY  04/04/2006    PROSTATE BIOPSY  06/03/2016    Prostate Needle Biopsy     SHOULDER SURGERY Left     Arthrocentesis of the shoulder sub-=acronial bursa area    UMBILICAL HERNIA REPAIR      UPPER GASTROINTESTINAL ENDOSCOPY       Social History   Social History     Substance and Sexual Activity   Alcohol Use No     Social History     Substance and Sexual Activity   Drug Use No     Social History     Tobacco Use   Smoking Status Never Smoker   Smokeless Tobacco Never Used     Family History:   Family History   Problem Relation Age of Onset    Hypertension Mother     Stroke Mother     Diabetes Mother     Gout Father     Alzheimer's disease Father     Diabetes Family         Mellitus       Meds/Allergies   all medications and allergies reviewed  Allergies   Allergen Reactions    Gold-Containing Drug Products Rash     1+ POSITIVE PATCH TEST    Parabens Rash     POSITIVE 1+ PATCH TEST       Objective   Vitals: There were no vitals taken for this visit  No intake or output data in the 24 hours ending 02/17/20 1451    Invasive Devices     Peripheral Intravenous Line            Peripheral IV 02/17/20 Left Forearm less than 1 day                Review of Systems:  Review of Systems  Completed  See HPI for positives, otherwise negative  Physical Exam   AT/NC  MMM  No JVD  Lungs clear bilateral  No acrocyanosis  Regular rate and rhythm  No S3 or S4  No MRG  Abdomen soft  NT  ND  Good BS  No leg edema  Lab Results: I have personally reviewed pertinent lab results  Imaging: I have personally reviewed pertinent reports  EKG:  SR    Stress:  abnormal    Cardiac catheterization: history of LAD PCI    Tele:SR    Code Status:    Epic/ dilitronics/Care Everywhere records reviewed: yes    ** Please Note: Fluency DirectDictation voice to text software may have been used in the creation of this document   **

## 2020-02-17 NOTE — DISCHARGE INSTRUCTIONS
1  Please see the post cardiac catheterization dishcarge instructions  No heavy lifting, greater than 10 lbs  or strenuous  activity for 48 hrs  2 Remove band aid tomorrow  Shower and wash area- wrist gently with soap and water- beginning tomorrow  Rinse and pat dry  Apply new water seal band aid  Repeat this process for 5 days  No powders, creams lotions or antibiotic ointments  for 5 days  No tub baths, hot tubs or swimming for 5 days  3  Please call our office ([phone]) if you have any fever, redness, swelling, discharge from your wrist access site  4 No driving for 2 days    5  Please follow up with your cardiologist     Left Heart Catheterization   WHAT YOU NEED TO KNOW:   A left heart catheterization is a procedure to look at your heart and its arteries  You may need this procedure if you have chest pain, heart disease, or your heart is not working as it should  DISCHARGE INSTRUCTIONS:   Follow up with your healthcare provider as directed:  Write down your questions so you remember to ask them during your visits  Limit activity as directed:   · Avoid unnecessary stair climbing for 48 hours, if a catheter was put in your groin  · Do not place pressure on your arm, hand, or wrist, if the catheter was placed in your wrist  Avoid pushing, pulling, or heavy lifting with that arm  · If you need to cough, support the area where the catheter was inserted with your hand  · Ask your healthcare provider how long you need to limit movement and avoid certain activities  · You may feel like resting more after your procedure  Slowly start to do more each day  Rest when you feel it is needed  Drink liquids as directed:  Liquids help flush the dye used for your procedure out of your body  Ask your healthcare provider how much liquid to drink each day, and which liquids to drink  Some foods, such as soup and fruit, also provide liquid     Wound care:  Ask your healthcare provider about how to care for your incision wound  Ask when you can get into a tub, shower, or pool  Contact your healthcare provider if:   · You have a fever  · The skin around your wound is red, swollen, or has pus coming from it  · You have trouble breathing, or your skin is itchy, swollen, or has a rash  · You have questions or concerns about your condition or care  Seek care immediately or call 911 if:   · The area where the catheter was placed is swollen and filled with blood or is bleeding  · The leg or arm used for the procedure becomes numb or turns white or blue  · You feel lightheaded, short of breath, and have chest pain  · You cough up blood  · You have any of the following signs of a heart attack:      ¨ Squeezing, pressure, or pain in your chest that lasts longer than 5 minutes or returns    ¨ Discomfort or pain in your back, neck, jaw, stomach, or arm     ¨ Trouble breathing    ¨ Nausea or vomiting    ¨ Lightheadedness or a sudden cold sweat, especially with chest pain or trouble breathing    · Your arm or leg feels warm, tender, and painful  It may look swollen and red  · You have any of the following signs of a stroke:     ¨ Part of your face droops or is numb    ¨ Weakness in an arm or leg    ¨ Confusion or difficulty speaking    ¨ Dizziness, a severe headache, or vision loss  © 2017 [employer] Information is for End User's use only and may not be sold, redistributed or otherwise used for commercial purposes  All illustrations and images included in CareNotes® are the copyrighted property of A D A M [employer]  or [employer]  The above information is an  only  It is not intended as medical advice for individual conditions or treatments  Talk to your doctor, nurse or pharmacist before following any medical regimen to see if it is safe and effective for you

## 2020-04-27 ENCOUNTER — TELEPHONE (OUTPATIENT)
Dept: UROLOGY | Facility: AMBULATORY SURGERY CENTER | Age: 75
End: 2020-04-27

## 2020-05-02 LAB — PSA SERPL-MCNC: 0.2 NG/ML

## 2020-06-17 DIAGNOSIS — N40.1 BPH WITH OBSTRUCTION/LOWER URINARY TRACT SYMPTOMS: ICD-10-CM

## 2020-06-17 DIAGNOSIS — N13.8 BPH WITH OBSTRUCTION/LOWER URINARY TRACT SYMPTOMS: ICD-10-CM

## 2020-06-17 RX ORDER — TAMSULOSIN HYDROCHLORIDE 0.4 MG/1
0.8 CAPSULE ORAL
Qty: 180 CAPSULE | Refills: 1 | Status: SHIPPED | OUTPATIENT
Start: 2020-06-17 | End: 2020-12-22 | Stop reason: SDUPTHER

## 2020-06-19 ENCOUNTER — TELEPHONE (OUTPATIENT)
Dept: UROLOGY | Facility: MEDICAL CENTER | Age: 75
End: 2020-06-19

## 2020-07-09 ENCOUNTER — OFFICE VISIT (OUTPATIENT)
Dept: FAMILY MEDICINE CLINIC | Facility: CLINIC | Age: 75
End: 2020-07-09
Payer: MEDICARE

## 2020-07-09 VITALS
TEMPERATURE: 97.5 F | DIASTOLIC BLOOD PRESSURE: 80 MMHG | HEIGHT: 70 IN | WEIGHT: 148.1 LBS | OXYGEN SATURATION: 99 % | BODY MASS INDEX: 21.2 KG/M2 | HEART RATE: 65 BPM | SYSTOLIC BLOOD PRESSURE: 120 MMHG

## 2020-07-09 DIAGNOSIS — E78.5 HYPERLIPIDEMIA, UNSPECIFIED HYPERLIPIDEMIA TYPE: ICD-10-CM

## 2020-07-09 DIAGNOSIS — E03.8 SUBCLINICAL HYPOTHYROIDISM: Chronic | ICD-10-CM

## 2020-07-09 DIAGNOSIS — C61 ADENOCARCINOMA OF PROSTATE (HCC): Primary | ICD-10-CM

## 2020-07-09 PROCEDURE — 99214 OFFICE O/P EST MOD 30 MIN: CPT | Performed by: FAMILY MEDICINE

## 2020-07-09 PROCEDURE — 1036F TOBACCO NON-USER: CPT | Performed by: FAMILY MEDICINE

## 2020-07-09 PROCEDURE — 3008F BODY MASS INDEX DOCD: CPT | Performed by: FAMILY MEDICINE

## 2020-07-09 PROCEDURE — 4040F PNEUMOC VAC/ADMIN/RCVD: CPT | Performed by: FAMILY MEDICINE

## 2020-07-09 PROCEDURE — 1160F RVW MEDS BY RX/DR IN RCRD: CPT | Performed by: FAMILY MEDICINE

## 2020-07-09 RX ORDER — ATROPINE SULFATE 10 MG/ML
1 SOLUTION/ DROPS OPHTHALMIC 2 TIMES DAILY
COMMUNITY
End: 2021-06-18

## 2020-07-09 NOTE — PATIENT INSTRUCTIONS
Stop the metoprolol  If your blood pressure climbs above 626 systolic on a regular basis let me know  I would expect her pulse rate to go up into the 70s or 80s maybe the blood pressure goes up 120-130  If her started to get any chest pain at all let me know  WE RECOMMEND GETTING THE 2- DOSE SHINGLES VACCINE ([address]) FROM YOUR PHARMACY  CHECK WITH THEM ON THE COST  YOU SHOULD HAVE THIS IF OVER AGE 48, EVEN IF YOU HAVE HAD THE ORIGINAL VACCINE (ZOSTRIX)  TO AVOID COVID (CORONAVIRUS) INFECTION THE FOLLOWING ARE HELPFUL:    1  Avoid areas where there are a lot of people  Examples would be small restaurants churches small grocery stores etc   Keep 6 feet between you  2  If you must go out around people use a mask  When around people use a hand  after touching surfaces  Important areas are grocery stores, gas pumps, pharmacies, peck, etc  Remember: masks protect the "other luana"  They are not a substitute for staying 6 feet away  3  KEEP YOUR HANDS AWAY FROM YOUR FACE  IT IS EXTREMELY DIFFICULT TO GET COVID INFECTION IF YOU DO NOT TOUCH YOUR FACE AND ARE NOT AROUND PEOPLE  3  If you wish to work in the yard or walk around the neighborhood or in a park and you are not around a lot of people it is okay to keep your mask in your pocket  Casually passing someone without a mask does not put you at risk  4  Try to avoid having people coming in and out of your home  This would include cleaning personnel delivery people unnecessary service people etc   after anyone comes into your home including family be careful to wipe all surfaces with a home

## 2020-07-09 NOTE — PROGRESS NOTES
Assessment/Plan:    Adenocarcinoma of prostate ([facility] [address] )  Current PSA ok     Abnormal nuclear stress test  Cath OK 2/20       Diagnoses and all orders for this visit:    [facility] )    Subclinical hypothyroidism    Hyperlipidemia, unspecified hyperlipidemia type    Other orders  -     atropine (ISOPTO ATROPINE) 1 % ophthalmic solution; Apply 1 drop to eye 2 (two) times a day          Subjective:      Patient ID: Dylon Saldana is a 66 y o  male  PATIENT RETURNS FOR FOLLOW-UP OF CHRONIC MEDICAL CONDITIONS  NO HOSPITAL STAYS OR EMERGENCY VISITS RECENTLY  MEDS WERE REVIEWED AND NO SIDE EFFECTS  NO NEW ISSUES  UNLESS NOTED BELOW  NO NEW MEDICAL PROVIDER REPORTED  THE CHRONIC DISEASES LISTED ABOVE ARE STABLE AND UNCHANGED/ THE PLAN OF CARE FOR THOSE WILL REMAIN UNCHANGED UNLESS NOTED BELOW  Patient is concerned somewhat about the low blood pressures he does get down into the 72-42 systolic range several times a even over the last several weeks  On occasion he also feels lightheaded at those times he records the pulse rate and that is usually in the 50s or 60s  He has no chest pain and no bowel issues      The following portions of the patient's history were reviewed and updated as appropriate: allergies, current medications, past family history, past medical history, past social history, past surgical history and problem list     Review of Systems   Constitutional: Negative for activity change and appetite change  HENT: Negative for trouble swallowing  Eyes: Negative for visual disturbance  Respiratory: Negative for cough and shortness of breath  Cardiovascular: Negative for chest pain, palpitations and leg swelling  Gastrointestinal: Negative for abdominal pain and blood in stool  Endocrine: Negative for polyuria  Genitourinary: Negative for difficulty urinating and hematuria  Skin: Negative for rash  Neurological: Negative for dizziness     Psychiatric/Behavioral: Negative for behavioral problems  Objective:  Vitals:    07/09/20 1328   BP: 120/80   BP Location: Left arm   Patient Position: Sitting   Cuff Size: Adult   Pulse: 65   Temp: 97 5 °F (36 4 °C)   TempSrc: Temporal   SpO2: 99%   Weight: 67 2 kg (148 lb 1 6 oz)   Height: 5' 10" (1 778 m)      Physical Exam   Constitutional: He appears well-developed and well-nourished  HENT:   Head: Normocephalic and atraumatic  Eyes: Conjunctivae are normal    Neck: Neck supple  No thyromegaly present  Cardiovascular: Normal rate, regular rhythm, normal heart sounds and intact distal pulses  No murmur heard  Pulmonary/Chest: Effort normal and breath sounds normal  No respiratory distress  Musculoskeletal: He exhibits no edema  Lymphadenopathy:     He has no cervical adenopathy  Skin: Skin is warm and dry  Psychiatric: He has a normal mood and affect  His behavior is normal        Patient's chronic problems that were reviewed today are stable  Recent hospital stays reviewed  Recent labs and imaging reviewed  Recent visits to other providers reviewed  Meds reviewed and no changes made  Appropriate labs and imaging were ordered  Preventive measures appropriate for age and sex were reviewed with patient  Immunizations were updated as appropriate

## 2020-09-08 ENCOUNTER — TELEPHONE (OUTPATIENT)
Dept: UROLOGY | Facility: MEDICAL CENTER | Age: 75
End: 2020-09-08

## 2020-09-08 NOTE — TELEPHONE ENCOUNTER
This is a patient of Dr Roque Ruby and seen by Amari Mchugh in [place]  Patient called and asked for his PSA script  I mailed it out to him

## 2020-09-15 ENCOUNTER — TELEPHONE (OUTPATIENT)
Dept: FAMILY MEDICINE CLINIC | Facility: CLINIC | Age: 75
End: 2020-09-15

## 2020-09-15 ENCOUNTER — TREATMENT (OUTPATIENT)
Dept: FAMILY MEDICINE CLINIC | Facility: CLINIC | Age: 75
End: 2020-09-15

## 2020-09-15 DIAGNOSIS — E78.5 HYPERLIPIDEMIA, UNSPECIFIED HYPERLIPIDEMIA TYPE: ICD-10-CM

## 2020-09-15 DIAGNOSIS — R97.20 PSA ELEVATION: ICD-10-CM

## 2020-09-15 DIAGNOSIS — E03.8 SUBCLINICAL HYPOTHYROIDISM: Primary | Chronic | ICD-10-CM

## 2020-09-15 NOTE — TELEPHONE ENCOUNTER
Patient called requesting to have blood work scipts generated  Wants to have everything checked including psa

## 2020-11-07 LAB
ALBUMIN SERPL-MCNC: 4.2 G/DL (ref 3.6–5.1)
ALBUMIN/GLOB SERPL: 2.1 (CALC) (ref 1–2.5)
ALP SERPL-CCNC: 55 U/L (ref 35–144)
ALT SERPL-CCNC: 22 U/L (ref 9–46)
AST SERPL-CCNC: 20 U/L (ref 10–35)
BASOPHILS # BLD AUTO: 28 CELLS/UL (ref 0–200)
BASOPHILS NFR BLD AUTO: 0.5 %
BILIRUB SERPL-MCNC: 0.8 MG/DL (ref 0.2–1.2)
BUN SERPL-MCNC: 20 MG/DL (ref 7–25)
BUN/CREAT SERPL: ABNORMAL (CALC) (ref 6–22)
CALCIUM SERPL-MCNC: 9.1 MG/DL (ref 8.6–10.3)
CHLORIDE SERPL-SCNC: 104 MMOL/L (ref 98–110)
CHOLEST SERPL-MCNC: 133 MG/DL
CHOLEST/HDLC SERPL: 3 (CALC)
CO2 SERPL-SCNC: 30 MMOL/L (ref 20–32)
CREAT SERPL-MCNC: 0.94 MG/DL (ref 0.7–1.18)
EOSINOPHIL # BLD AUTO: 168 CELLS/UL (ref 15–500)
EOSINOPHIL NFR BLD AUTO: 3 %
ERYTHROCYTE [DISTWIDTH] IN BLOOD BY AUTOMATED COUNT: 12.6 % (ref 11–15)
GLOBULIN SER CALC-MCNC: 2 G/DL (CALC) (ref 1.9–3.7)
GLUCOSE SERPL-MCNC: 104 MG/DL (ref 65–99)
HCT VFR BLD AUTO: 42.7 % (ref 38.5–50)
HDLC SERPL-MCNC: 44 MG/DL
HGB BLD-MCNC: 13.9 G/DL (ref 13.2–17.1)
LDLC SERPL CALC-MCNC: 74 MG/DL (CALC)
LYMPHOCYTES # BLD AUTO: 812 CELLS/UL (ref 850–3900)
LYMPHOCYTES NFR BLD AUTO: 14.5 %
MCH RBC QN AUTO: 29.1 PG (ref 27–33)
MCHC RBC AUTO-ENTMCNC: 32.6 G/DL (ref 32–36)
MCV RBC AUTO: 89.5 FL (ref 80–100)
MONOCYTES # BLD AUTO: 420 CELLS/UL (ref 200–950)
MONOCYTES NFR BLD AUTO: 7.5 %
NEUTROPHILS # BLD AUTO: 4172 CELLS/UL (ref 1500–7800)
NEUTROPHILS NFR BLD AUTO: 74.5 %
NONHDLC SERPL-MCNC: 89 MG/DL (CALC)
PLATELET # BLD AUTO: 174 THOUSAND/UL (ref 140–400)
PMV BLD REES-ECKER: 10 FL (ref 7.5–12.5)
POTASSIUM SERPL-SCNC: 4.1 MMOL/L (ref 3.5–5.3)
PROT SERPL-MCNC: 6.2 G/DL (ref 6.1–8.1)
PSA SERPL-MCNC: 0.2 NG/ML
RBC # BLD AUTO: 4.77 MILLION/UL (ref 4.2–5.8)
SL AMB EGFR AFRICAN AMERICAN: 90 ML/MIN/1.73M2
SL AMB EGFR NON AFRICAN AMERICAN: 78 ML/MIN/1.73M2
SODIUM SERPL-SCNC: 141 MMOL/L (ref 135–146)
T4 FREE SERPL-MCNC: 0.8 NG/DL (ref 0.8–1.8)
TRIGL SERPL-MCNC: 71 MG/DL
TSH SERPL-ACNC: 4.68 MIU/L (ref 0.4–4.5)
WBC # BLD AUTO: 5.6 THOUSAND/UL (ref 3.8–10.8)

## 2020-11-30 ENCOUNTER — OFFICE VISIT (OUTPATIENT)
Dept: UROLOGY | Facility: AMBULATORY SURGERY CENTER | Age: 75
End: 2020-11-30
Payer: MEDICARE

## 2020-11-30 VITALS
HEART RATE: 75 BPM | HEIGHT: 70 IN | BODY MASS INDEX: 21.33 KG/M2 | WEIGHT: 149 LBS | DIASTOLIC BLOOD PRESSURE: 78 MMHG | SYSTOLIC BLOOD PRESSURE: 122 MMHG

## 2020-11-30 DIAGNOSIS — Z87.448 HISTORY OF URETHRAL STRICTURE: ICD-10-CM

## 2020-11-30 DIAGNOSIS — N40.1 BPH WITH OBSTRUCTION/LOWER URINARY TRACT SYMPTOMS: Primary | ICD-10-CM

## 2020-11-30 DIAGNOSIS — N13.8 BPH WITH OBSTRUCTION/LOWER URINARY TRACT SYMPTOMS: Primary | ICD-10-CM

## 2020-11-30 DIAGNOSIS — C61 ADENOCARCINOMA OF PROSTATE (HCC): ICD-10-CM

## 2020-11-30 LAB — POST-VOID RESIDUAL VOLUME, ML POC: 26 ML

## 2020-11-30 PROCEDURE — 99214 OFFICE O/P EST MOD 30 MIN: CPT | Performed by: NURSE PRACTITIONER

## 2020-11-30 PROCEDURE — 51798 US URINE CAPACITY MEASURE: CPT | Performed by: NURSE PRACTITIONER

## 2020-12-08 ENCOUNTER — OFFICE VISIT (OUTPATIENT)
Dept: FAMILY MEDICINE CLINIC | Facility: CLINIC | Age: 75
End: 2020-12-08
Payer: MEDICARE

## 2020-12-08 VITALS
HEIGHT: 70 IN | BODY MASS INDEX: 20.92 KG/M2 | SYSTOLIC BLOOD PRESSURE: 100 MMHG | RESPIRATION RATE: 14 BRPM | HEART RATE: 72 BPM | DIASTOLIC BLOOD PRESSURE: 60 MMHG | TEMPERATURE: 97.5 F | OXYGEN SATURATION: 98 % | WEIGHT: 146.1 LBS

## 2020-12-08 DIAGNOSIS — L29.9 PRURITUS: Chronic | ICD-10-CM

## 2020-12-08 DIAGNOSIS — R79.9 ABNORMAL FINDING OF BLOOD CHEMISTRY, UNSPECIFIED: ICD-10-CM

## 2020-12-08 DIAGNOSIS — E03.8 SUBCLINICAL HYPOTHYROIDISM: Chronic | ICD-10-CM

## 2020-12-08 DIAGNOSIS — Z23 ENCOUNTER FOR IMMUNIZATION: Primary | ICD-10-CM

## 2020-12-08 DIAGNOSIS — E55.9 VITAMIN D DEFICIENCY: ICD-10-CM

## 2020-12-08 DIAGNOSIS — E78.5 HYPERLIPIDEMIA, UNSPECIFIED HYPERLIPIDEMIA TYPE: ICD-10-CM

## 2020-12-08 DIAGNOSIS — I25.10 CHRONIC CORONARY ARTERY DISEASE: ICD-10-CM

## 2020-12-08 DIAGNOSIS — C61 ADENOCARCINOMA OF PROSTATE (HCC): ICD-10-CM

## 2020-12-08 LAB — SL AMB POCT HEMOGLOBIN AIC: 6 (ref ?–6.5)

## 2020-12-08 PROCEDURE — 83036 HEMOGLOBIN GLYCOSYLATED A1C: CPT | Performed by: FAMILY MEDICINE

## 2020-12-08 PROCEDURE — 90662 IIV NO PRSV INCREASED AG IM: CPT

## 2020-12-08 PROCEDURE — G0008 ADMIN INFLUENZA VIRUS VAC: HCPCS

## 2020-12-08 PROCEDURE — 99214 OFFICE O/P EST MOD 30 MIN: CPT | Performed by: FAMILY MEDICINE

## 2020-12-08 RX ORDER — DIGOXIN 125 MCG
125 TABLET ORAL DAILY
Qty: 90 TABLET | Refills: 3
Start: 2020-12-08

## 2020-12-22 DIAGNOSIS — N40.1 BPH WITH OBSTRUCTION/LOWER URINARY TRACT SYMPTOMS: ICD-10-CM

## 2020-12-22 DIAGNOSIS — N13.8 BPH WITH OBSTRUCTION/LOWER URINARY TRACT SYMPTOMS: ICD-10-CM

## 2020-12-22 RX ORDER — TAMSULOSIN HYDROCHLORIDE 0.4 MG/1
0.8 CAPSULE ORAL
Qty: 180 CAPSULE | Refills: 1 | Status: SHIPPED | OUTPATIENT
Start: 2020-12-22 | End: 2021-08-17 | Stop reason: SDUPTHER

## 2021-01-18 ENCOUNTER — TELEPHONE (OUTPATIENT)
Dept: UROLOGY | Facility: MEDICAL CENTER | Age: 76
End: 2021-01-18

## 2021-01-18 NOTE — TELEPHONE ENCOUNTER
Patient last seen Caro Yoon [facility]  Patient called in stating for the past  several nights he has frequent urination  Patient stated he voids about 70cc during the night  Patient stated the stream has gotten weaker over the last several month   Patient can be reached at [phone]

## 2021-01-18 NOTE — TELEPHONE ENCOUNTER
Patient with h/o prostate cancer s/p radiation, urethral stricture and BPH  Called and spoke with patient  He reports he is getting up 5 x per night and only voiding 60 mL  During the day the frequency is better but he can only get to two hours and voids about 75 mL  He denies dysuria, hematuria, fever, chills, cloudy urine  He reports his urine stream has been decreasing in strength for the past several months  Patient concerned about infection  I reviewed his symptoms are more consistent with stricture  Offered nurse visit tomorrow for PVR and urine dip and further assessment  Can discuss results with provider at that time and come up with plan  Patient agreeable

## 2021-01-19 ENCOUNTER — PROCEDURE VISIT (OUTPATIENT)
Dept: UROLOGY | Facility: AMBULATORY SURGERY CENTER | Age: 76
End: 2021-01-19
Payer: MEDICARE

## 2021-01-19 VITALS
HEIGHT: 70 IN | SYSTOLIC BLOOD PRESSURE: 110 MMHG | BODY MASS INDEX: 20.73 KG/M2 | HEART RATE: 72 BPM | WEIGHT: 144.8 LBS | DIASTOLIC BLOOD PRESSURE: 70 MMHG

## 2021-01-19 DIAGNOSIS — N40.1 BPH WITH OBSTRUCTION/LOWER URINARY TRACT SYMPTOMS: Primary | ICD-10-CM

## 2021-01-19 DIAGNOSIS — N13.8 BPH WITH OBSTRUCTION/LOWER URINARY TRACT SYMPTOMS: Primary | ICD-10-CM

## 2021-01-19 LAB
POST-VOID RESIDUAL VOLUME, ML POC: 10.27 ML
SL AMB  POCT GLUCOSE, UA: NORMAL
SL AMB LEUKOCYTE ESTERASE,UA: NORMAL
SL AMB POCT BILIRUBIN,UA: NORMAL
SL AMB POCT BLOOD,UA: NORMAL
SL AMB POCT CLARITY,UA: CLEAR
SL AMB POCT COLOR,UA: YELLOW
SL AMB POCT KETONES,UA: NORMAL
SL AMB POCT NITRITE,UA: NORMAL
SL AMB POCT PH,UA: 5
SL AMB POCT SPECIFIC GRAVITY,UA: 1.01
SL AMB POCT URINE PROTEIN: NORMAL
SL AMB POCT UROBILINOGEN: NORMAL

## 2021-01-19 PROCEDURE — 81002 URINALYSIS NONAUTO W/O SCOPE: CPT

## 2021-01-19 PROCEDURE — 51798 US URINE CAPACITY MEASURE: CPT

## 2021-01-19 NOTE — PROGRESS NOTES
1/19/2021  Solomon Gomez is a 66 y o  male  [id number]    Diagnosis:    Chief Complaint     Difficulty Urinating; urethral stricture          Patient presents for symptom triage history of prostate cancer s/p radiation with post treatment stricture managed by our office    Plan:    Per Dr Adele Aguirre, first available cystoscopy to evaluate for possible stricture recurrence vs  OAB symptoms r/t post radiation changes  Call the office in the meantime if symptoms worsen or unable to urinate  History:    h/o prostate cancer s/p radiation, urethral stricture  Last cystoscopy 5/2019  Patient had previously done cic to keep stricture open but has not done this in > 6 months  Assessment:    He reports he is getting up 5 x per night and only voiding 60 mL  During the day the frequency is better but he can only get to two hours and voids about 75 mL  He denies dysuria, hematuria, fever, chills, cloudy urine  He reports his urine stream has been decreasing in strength for the past several months  Patient voided 60 mL in the office, clear zaida urine  Vitals:    01/19/21 1342   BP: 110/70   BP Location: Left arm   Patient Position: Sitting   Cuff Size: Standard   Pulse: 72   Weight: 65 7 kg (144 lb 12 8 oz)   Height: 5' 10" (1 778 m)       Recent Results (from the past 4 hour(s))   POCT urine dip    Collection Time: 01/19/21  2:04 PM   Result Value Ref Range    LEUKOCYTE ESTERASE,UA -     NITRITE,UA -     SL AMB POCT UROBILINOGEN -     POCT URINE PROTEIN -      PH,UA 5 0     BLOOD,UA +     SPECIFIC GRAVITY,UA 1 015     KETONES,UA -     BILIRUBIN,UA -     GLUCOSE, UA -      COLOR,UA yellow     CLARITY,UA clear    POCT Measure PVR    Collection Time: 01/19/21  2:04 PM   Result Value Ref Range    POST-VOID RESIDUAL VOLUME, ML POC 10 27 mL         Discussion:  Patient currently emptying his bladder well  Discussed with Dr Adele Aguirre, see plan    Patient requested a catheter "just in case" as he had a history of CIC to keep stricture open  I provided him with one catheter sample, but encouraged him if he has a problem he should call the office and if catheter does not go in easily he should never force it  Patient verbalized understanding           ESSENCE Duke BSN

## 2021-01-20 DIAGNOSIS — Z23 ENCOUNTER FOR IMMUNIZATION: ICD-10-CM

## 2021-01-28 ENCOUNTER — IMMUNIZATIONS (OUTPATIENT)
Dept: FAMILY MEDICINE CLINIC | Facility: HOSPITAL | Age: 76
End: 2021-01-28

## 2021-01-28 DIAGNOSIS — Z23 ENCOUNTER FOR IMMUNIZATION: Primary | ICD-10-CM

## 2021-01-28 PROCEDURE — 91301 SARS-COV-2 / COVID-19 MRNA VACCINE (MODERNA) 100 MCG: CPT

## 2021-01-28 PROCEDURE — 0011A SARS-COV-2 / COVID-19 MRNA VACCINE (MODERNA) 100 MCG: CPT

## 2021-03-01 ENCOUNTER — IMMUNIZATIONS (OUTPATIENT)
Dept: FAMILY MEDICINE CLINIC | Facility: HOSPITAL | Age: 76
End: 2021-03-01

## 2021-03-01 DIAGNOSIS — Z23 ENCOUNTER FOR IMMUNIZATION: Primary | ICD-10-CM

## 2021-03-01 PROCEDURE — 91301 SARS-COV-2 / COVID-19 MRNA VACCINE (MODERNA) 100 MCG: CPT

## 2021-03-01 PROCEDURE — 0012A SARS-COV-2 / COVID-19 MRNA VACCINE (MODERNA) 100 MCG: CPT

## 2021-04-27 NOTE — TELEPHONE ENCOUNTER
Call returned to patient, advised that he should keep cysto as scheduled  Patient states that he has "made other plans for tomorrow" and wants to cancel  Patient does not want to reschedule at this time

## 2021-04-27 NOTE — TELEPHONE ENCOUNTER
Appointment reminder call was made to patient for 4-28-21 cysto appt with Dr Laya Serra      He would like to know if he definitely still needs the appointment if he is doing better and his stream has significantly improved  Please call the patient back to discuss, and advise whether or not appointment is needed  Thank you!

## 2021-05-12 ENCOUNTER — TELEPHONE (OUTPATIENT)
Dept: FAMILY MEDICINE CLINIC | Facility: CLINIC | Age: 76
End: 2021-05-12

## 2021-05-12 ENCOUNTER — TREATMENT (OUTPATIENT)
Dept: FAMILY MEDICINE CLINIC | Facility: CLINIC | Age: 76
End: 2021-05-12

## 2021-05-12 DIAGNOSIS — E55.9 VITAMIN D DEFICIENCY: Primary | ICD-10-CM

## 2021-05-12 DIAGNOSIS — E03.8 SUBCLINICAL HYPOTHYROIDISM: Chronic | ICD-10-CM

## 2021-05-12 NOTE — TELEPHONE ENCOUNTER
[facility]  Patient wondering if he's due for any blood work before his June appointment  Would like to do everything all at once if he can  Please advise

## 2021-05-26 LAB
25(OH)D3 SERPL-MCNC: 51 NG/ML (ref 30–100)
DIGOXIN SERPL-MCNC: <0.5 MCG/L (ref 0.8–2)
PSA SERPL-MCNC: 0.2 NG/ML
T4 FREE SERPL-MCNC: 0.9 NG/DL (ref 0.8–1.8)
TSH SERPL-ACNC: 6.57 MIU/L (ref 0.4–4.5)

## 2021-06-07 ENCOUNTER — OFFICE VISIT (OUTPATIENT)
Dept: UROLOGY | Facility: AMBULATORY SURGERY CENTER | Age: 76
End: 2021-06-07
Payer: MEDICARE

## 2021-06-07 VITALS
WEIGHT: 140 LBS | DIASTOLIC BLOOD PRESSURE: 70 MMHG | BODY MASS INDEX: 20.04 KG/M2 | HEART RATE: 80 BPM | SYSTOLIC BLOOD PRESSURE: 120 MMHG | HEIGHT: 70 IN

## 2021-06-07 DIAGNOSIS — C61 PROSTATE CANCER (HCC): Primary | ICD-10-CM

## 2021-06-07 PROCEDURE — 99213 OFFICE O/P EST LOW 20 MIN: CPT | Performed by: NURSE PRACTITIONER

## 2021-06-07 NOTE — PROGRESS NOTES
6/7/2021      Chief Complaint   Patient presents with    Benign Prostatic Hypertrophy    Prostate Cancer     Assessment and Plan    66 y o  male managed by Dr Kelli Ash    1  Prostate cancer  ·  status post radiation therapy 10/2016  ·  PSA performed 05/25/2021 resulted 0 2  ·  TOSIN- flat smooth prostate  ·  repeat PSA in 6 months and TOSIN in 1 year  · After this next 6 month check PSA will be done annually    2  History of urethral stricture  ·  patient refused bladder scan PVR in office    3  Benign prostatic hyperplasia with lower urinary tract symptoms  ·  patient refused to provide urine sample in office today  ·  Will continue with tamsulosin as previously prescribed;   Patient requesting to decrease to 0 4 mg p o  daily to see if this affects his urinary symptoms in any way  We discussed that it is okay for him to trial this  If he reports worsening or more bothersome symptoms with his urinary tract he is then to return to 0 8 mg daily  He is in agreement with this plan  ·  maintain adequate hydration upwards to 40 oz of water intake per day  ·  avoid bladder irritating foods and beverages  ·  Ensure complete bladder emptying with urination    History of Present Illness  Stu Ramos is a 66 y o  male here for follow up evaluation of [place] [address] prostate cancer  Patient is status post radiation therapy 10/2016  He also has a history of urethral stricture and benign prostatic hyperplasia with lower urinary tract symptoms  Regarding urinary symptoms, he has been managed well on tamsulosin 0 8 mg p o  daily  He wishes to reduce the amount of tamsulosin he is taking per day to see if there is change in his urinary symptoms  Patient reports his urinary stream to be strong with complete sensation of bladder emptying after urinating  He reports getting up 2 times at night to urinate  He reports these changes to be an improvement over his last office evaluation    His most recent PSA performed 05/25/2021 resulted 0 2  He currently denies changes to his general health since prior office evaluation  He is very happy with his current urinary status  Component       PSA, Total   Latest Ref Rng & Units       < OR = 4 0 ng/mL   4/17/2018      3:06 PM 0 4   10/24/2018      9:42 AM 0 3   5/6/2019      12:49 PM 0 2   11/7/2019      2:29 PM 0 3   5/1/2020      12:54 PM 0 2   11/6/2020      9:06 AM 0 2   5/25/2021      7:05 AM 0 2       Review of Systems   Constitutional: Negative for chills and fever  Respiratory: Negative for cough and shortness of breath  Cardiovascular: Negative for chest pain  Gastrointestinal: Negative for abdominal distention, abdominal pain, blood in stool, nausea and vomiting  Genitourinary: Negative for difficulty urinating, dysuria, enuresis, flank pain, frequency, hematuria and urgency  Musculoskeletal: Negative for back pain  Skin: Negative for rash  Neurological: Negative for dizziness       Past Medical History  Past Medical History:   Diagnosis Date    Diverticulitis w/o hemorrhage 2008    Dyspepsia 2005    Nephrolithiasis     PVC (premature ventricular contraction) 03/30/2012       Past Social History  Past Surgical History:   Procedure Laterality Date    CATARACT EXTRACTION Right 06/2018    CATARACT EXTRACTION Left 08/30/2018    COLONOSCOPY W/ POLYPECTOMY  04/04/2006    PROSTATE BIOPSY  06/03/2016    Prostate Needle Biopsy     SHOULDER SURGERY Left     Arthrocentesis of the shoulder sub-=acronial bursa area    UMBILICAL HERNIA REPAIR      UPPER GASTROINTESTINAL ENDOSCOPY       Social History     Tobacco Use   Smoking Status Never Smoker   Smokeless Tobacco Never Used       Past Family History  Family History   Problem Relation Age of Onset    Hypertension Mother     Stroke Mother     Diabetes Mother     Gout Father     Alzheimer's disease Father     Diabetes Family         Mellitus       Past Social history  Social History     Socioeconomic History    Marital status:       Spouse name: Not on file    Number of children: Not on file    Years of education: Not on file    Highest education level: Not on file   Occupational History    Not on file   Social Needs    Financial resource strain: Not on file    Food insecurity     Worry: Not on file     Inability: Not on file    Transportation needs     Medical: Not on file     Non-medical: Not on file   Tobacco Use    Smoking status: Never Smoker    Smokeless tobacco: Never Used   Substance and Sexual Activity    Alcohol use: No    Drug use: No    Sexual activity: Never   Lifestyle    Physical activity     Days per week: Not on file     Minutes per session: Not on file    Stress: Not on file   Relationships    Social connections     Talks on phone: Not on file     Gets together: Not on file     Attends [employer] service: Not on file     Active member of club or organization: Not on file     Attends meetings of clubs or organizations: Not on file     Relationship status: Not on file    Intimate partner violence     Fear of current or ex partner: Not on file     Emotionally abused: Not on file     Physically abused: Not on file     Forced sexual activity: Not on file   Other Topics Concern    Not on file   Social History Narrative    Not on file       Current Medications  Current Outpatient Medications   Medication Sig Dispense Refill    aspirin 81 MG tablet Take 1 tablet by mouth daily      atorvastatin (LIPITOR) 10 mg tablet Take 10 mg by mouth daily        Cholecalciferol (VITAMIN D3) 2000 units capsule Take 1 capsule by mouth daily      digoxin (LANOXIN) 0 125 mg tablet Take 1 tablet (125 mcg total) by mouth daily 90 tablet 3    nitroglycerin (NITROSTAT) 0 4 mg SL tablet PLEASE SEE ATTACHED FOR DETAILED DIRECTIONS  2    tamsulosin (FLOMAX) 0 4 mg Take 2 capsules (0 8 mg total) by mouth daily with dinner 180 capsule 1    atropine (ISOPTO ATROPINE) 1 % ophthalmic solution Apply 1 drop to eye 2 (two) times a day       No current facility-administered medications for this visit  Allergies  Allergies   Allergen Reactions    Gold-Containing Drug Products Rash     1+ POSITIVE PATCH TEST    Parabens Rash     POSITIVE 1+ PATCH TEST         The following portions of the patient's history were reviewed and updated as appropriate: allergies, current medications, past medical history, past social history, past surgical history and problem list       Vitals  Vitals:    06/07/21 1040   BP: 120/70   Pulse: 80   Weight: 63 5 kg (140 lb)   Height: 5' 10" (1 778 m)           Physical Exam  Physical Exam  Vitals signs reviewed  Constitutional:       General: He is not in acute distress  Appearance: Normal appearance  He is normal weight  Pulmonary:      Effort: No respiratory distress  Breath sounds: Normal breath sounds  Genitourinary:     Comments:   TOSIN-flat, smooth prostate with no nodules noted  Skin:     General: Skin is warm and dry  Neurological:      General: No focal deficit present  Mental Status: He is alert and oriented to person, place, and time     Psychiatric:         Mood and Affect: Mood normal          Behavior: Behavior normal        Results  No results found for this or any previous visit (from the past 1 hour(s)) ]  Lab Results   Component Value Date    PSA 0 2 05/25/2021    PSA 0 2 11/06/2020    PSA 0 2 05/01/2020     Lab Results   Component Value Date    CALCIUM 9 1 11/06/2020     06/18/2016    K 4 1 11/06/2020    CO2 30 11/06/2020     11/06/2020    BUN 20 11/06/2020    CREATININE 0 94 11/06/2020     Lab Results   Component Value Date    WBC 5 6 11/06/2020    HGB 13 9 11/06/2020    HCT 42 7 11/06/2020    MCV 89 5 11/06/2020     11/06/2020     Orders  Orders Placed This Encounter   Procedures    PSA Total, Diagnostic     Standing Status:   Future     Standing Expiration Date:   6/7/2022       MOE Welch

## 2021-06-21 ENCOUNTER — OFFICE VISIT (OUTPATIENT)
Dept: FAMILY MEDICINE CLINIC | Facility: CLINIC | Age: 76
End: 2021-06-21
Payer: MEDICARE

## 2021-06-21 VITALS
BODY MASS INDEX: 20.52 KG/M2 | WEIGHT: 146.6 LBS | HEIGHT: 71 IN | TEMPERATURE: 97.7 F | DIASTOLIC BLOOD PRESSURE: 56 MMHG | SYSTOLIC BLOOD PRESSURE: 102 MMHG | OXYGEN SATURATION: 100 % | HEART RATE: 98 BPM

## 2021-06-21 DIAGNOSIS — E78.5 HYPERLIPIDEMIA, UNSPECIFIED HYPERLIPIDEMIA TYPE: ICD-10-CM

## 2021-06-21 DIAGNOSIS — E03.8 SUBCLINICAL HYPOTHYROIDISM: Primary | Chronic | ICD-10-CM

## 2021-06-21 DIAGNOSIS — C61 ADENOCARCINOMA OF PROSTATE (HCC): ICD-10-CM

## 2021-06-21 DIAGNOSIS — Z23 ENCOUNTER FOR IMMUNIZATION: ICD-10-CM

## 2021-06-21 DIAGNOSIS — Z11.59 NEED FOR HEPATITIS C SCREENING TEST: ICD-10-CM

## 2021-06-21 DIAGNOSIS — I25.10 CHRONIC CORONARY ARTERY DISEASE: ICD-10-CM

## 2021-06-21 DIAGNOSIS — M17.0 PRIMARY LOCALIZED OSTEOARTHRITIS OF KNEES, BILATERAL: ICD-10-CM

## 2021-06-21 PROCEDURE — 99214 OFFICE O/P EST MOD 30 MIN: CPT | Performed by: FAMILY MEDICINE

## 2021-06-21 PROCEDURE — 1123F ACP DISCUSS/DSCN MKR DOCD: CPT | Performed by: FAMILY MEDICINE

## 2021-06-21 PROCEDURE — G0439 PPPS, SUBSEQ VISIT: HCPCS | Performed by: FAMILY MEDICINE

## 2021-06-21 NOTE — PATIENT INSTRUCTIONS
Medicare Preventive Visit Patient Instructions  Thank you for completing your Welcome to Medicare Visit or Medicare Annual Wellness Visit today  Your next wellness visit will be due in one year (6/22/2022)  The screening/preventive services that you may require over the next 5-10 years are detailed below  Some tests may not apply to you based off risk factors and/or age  Screening tests ordered at today's visit but not completed yet may show as past due  Also, please note that scanned in results may not display below  Preventive Screenings:  Service Recommendations Previous Testing/Comments   Colorectal Cancer Screening  · Colonoscopy    · Fecal Occult Blood Test (FOBT)/Fecal Immunochemical Test (FIT)  · Fecal DNA/Cologuard Test  · Flexible Sigmoidoscopy Age: 54-65 years old   Colonoscopy: every 10 years (May be performed more frequently if at higher risk)  OR  FOBT/FIT: every 1 year  OR  Cologuard: every 3 years  OR  Sigmoidoscopy: every 5 years  Screening may be recommended earlier than age 48 if at higher risk for colorectal cancer  Also, an individualized decision between you and your healthcare provider will decide whether screening between the ages of 74-80 would be appropriate  Colonoscopy: 04/11/2011  FOBT/FIT: Not on file  Cologuard: Not on file  Sigmoidoscopy: Not on file          Prostate Cancer Screening Individualized decision between patient and health care provider in men between ages of 53-78   Medicare will cover every 12 months beginning on the day after your 50th birthday PSA: 0 2 ng/mL           Hepatitis C Screening Once for adults born between 1945 and 1965  More frequently in patients at high risk for Hepatitis C Hep C Antibody: Not on file        Diabetes Screening 1-2 times per year if you're at risk for diabetes or have pre-diabetes Fasting glucose: 101 mg/dL   A1C: 6 0        Cholesterol Screening Once every 5 years if you don't have a lipid disorder   May order more often based on risk factors  Lipid panel: 11/06/2020           Other Preventive Screenings Covered by Medicare:  1  Abdominal Aortic Aneurysm (AAA) Screening: covered once if your at risk  You're considered to be at risk if you have a family history of AAA or a male between the age of 73-68 who smoking at least 100 cigarettes in your lifetime  2  Lung Cancer Screening: covers low dose CT scan once per year if you meet all of the following conditions: (1) Age 50-69; (2) No signs or symptoms of lung cancer; (3) Current smoker or have quit smoking within the last 15 years; (4) You have a tobacco smoking history of at least 30 pack years (packs per day x number of years you smoked); (5) You get a written order from a healthcare provider  3  Glaucoma Screening: covered annually if you're considered high risk: (1) You have diabetes OR (2) Family history of glaucoma OR (3)  aged 48 and older OR (3)  American aged 72 and older  3  Osteoporosis Screening: covered every 2 years if you meet one of the following conditions: (1) Have a vertebral abnormality; (2) On glucocorticoid therapy for more than 3 months; (3) Have primary hyperparathyroidism; (4) On osteoporosis medications and need to assess response to drug therapy  5  HIV Screening: covered annually if you're between the age of 12-76  Also covered annually if you are younger than 13 and older than 72 with risk factors for HIV infection  For pregnant patients, it is covered up to 3 times per pregnancy  Immunizations:  Immunization Recommendations   Influenza Vaccine Annual influenza vaccination during flu season is recommended for all persons aged >= 6 months who do not have contraindications   Pneumococcal Vaccine (Prevnar and Pneumovax)  * Prevnar = PCV13  * Pneumovax = PPSV23 Adults 25-60 years old: 1-3 doses may be recommended based on certain risk factors  Adults 72 years old: Prevnar (PCV13) vaccine recommended followed by Pneumovax (PPSV23) vaccine   If already received PPSV23 since turning 65, then PCV13 recommended at least one year after PPSV23 dose  Hepatitis B Vaccine 3 dose series if at intermediate or high risk (ex: diabetes, end stage renal disease, liver disease)   Tetanus (Td) Vaccine - COST NOT COVERED BY MEDICARE PART B Following completion of primary series, a booster dose should be given every 10 years to maintain immunity against tetanus  Td may also be given as tetanus wound prophylaxis  Tdap Vaccine - COST NOT COVERED BY MEDICARE PART B Recommended at least once for all adults  For pregnant patients, recommended with each pregnancy  Shingles Vaccine (Shingrix) - COST NOT COVERED BY MEDICARE PART B  2 shot series recommended in those aged 48 and above     Health Maintenance Due:      Topic Date Due    Hepatitis C Screening  Never done     Immunizations Due:      Topic Date Due    DTaP,Tdap,and Td Vaccines (1 - Tdap) Never done     Advance Directives   What are advance directives? Advance directives are legal documents that state your wishes and plans for medical care  These plans are made ahead of time in case you lose your ability to make decisions for yourself  Advance directives can apply to any medical decision, such as the treatments you want, and if you want to donate organs  What are the types of advance directives? There are many types of advance directives, and each state has rules about how to use them  You may choose a combination of any of the following:  · Living will: This is a written record of the treatment you want  You can also choose which treatments you do not want, which to limit, and which to stop at a certain time  This includes surgery, medicine, IV fluid, and tube feedings  · Durable power of  for healthcare [facility]): This is a written record that states who you want to make healthcare choices for you when you are unable to make them for yourself   This person, called a proxy, is usually a family member or a friend  You may choose more than 1 proxy  · Do not resuscitate (DNR) order:  A DNR order is used in case your heart stops beating or you stop breathing  It is a request not to have certain forms of treatment, such as CPR  A DNR order may be included in other types of advance directives  · Medical directive: This covers the care that you want if you are in a coma, near death, or unable to make decisions for yourself  You can list the treatments you want for each condition  Treatment may include pain medicine, surgery, blood transfusions, dialysis, IV or tube feedings, and a ventilator (breathing machine)  · Values history: This document has questions about your views, beliefs, and how you feel and think about life  This information can help others choose the care that you would choose  Why are advance directives important? An advance directive helps you control your care  Although spoken wishes may be used, it is better to have your wishes written down  Spoken wishes can be misunderstood, or not followed  Treatments may be given even if you do not want them  An advance directive may make it easier for your family to make difficult choices about your care  © Copyright [employer] 2018 Information is for End User's use only and may not be sold, redistributed or otherwise used for commercial purposes  All illustrations and images included in CareNotes® are the copyrighted property of [employer]  or [facility] Preventive Visit Patient Instructions  Thank you for completing your Welcome to Medicare Visit or Medicare Annual Wellness Visit today  Your next wellness visit will be due in one year (6/22/2022)  The screening/preventive services that you may require over the next 5-10 years are detailed below  Some tests may not apply to you based off risk factors and/or age  Screening tests ordered at today's visit but not completed yet may show as past due   Also, please note that scanned in results may not display below  Preventive Screenings:  Service Recommendations Previous Testing/Comments   Colorectal Cancer Screening  · Colonoscopy    · Fecal Occult Blood Test (FOBT)/Fecal Immunochemical Test (FIT)  · Fecal DNA/Cologuard Test  · Flexible Sigmoidoscopy Age: 54-65 years old   Colonoscopy: every 10 years (May be performed more frequently if at higher risk)  OR  FOBT/FIT: every 1 year  OR  Cologuard: every 3 years  OR  Sigmoidoscopy: every 5 years  Screening may be recommended earlier than age 48 if at higher risk for colorectal cancer  Also, an individualized decision between you and your healthcare provider will decide whether screening between the ages of 74-80 would be appropriate  Colonoscopy: 04/11/2011  FOBT/FIT: Not on file  Cologuard: Not on file  Sigmoidoscopy: Not on file          Prostate Cancer Screening Individualized decision between patient and health care provider in men between ages of 53-78   Medicare will cover every 12 months beginning on the day after your 50th birthday PSA: 0 2 ng/mL     History Prostate Cancer  Screening Not Indicated     Hepatitis C Screening Once for adults born between 1945 and 1965  More frequently in patients at high risk for Hepatitis C Hep C Antibody: Not on file        Diabetes Screening 1-2 times per year if you're at risk for diabetes or have pre-diabetes Fasting glucose: 101 mg/dL   A1C: 6 0    Screening Current   Cholesterol Screening Once every 5 years if you don't have a lipid disorder  May order more often based on risk factors  Lipid panel: 11/06/2020    Screening Not Indicated  History Lipid Disorder      Other Preventive Screenings Covered by Medicare:  6  Abdominal Aortic Aneurysm (AAA) Screening: covered once if your at risk  You're considered to be at risk if you have a family history of AAA or a male between the age of 73-68 who smoking at least 100 cigarettes in your lifetime    7  Lung Cancer Screening: covers low dose CT scan once per year if you meet all of the following conditions: (1) Age 50-69; (2) No signs or symptoms of lung cancer; (3) Current smoker or have quit smoking within the last 15 years; (4) You have a tobacco smoking history of at least 30 pack years (packs per day x number of years you smoked); (5) You get a written order from a healthcare provider  8  Glaucoma Screening: covered annually if you're considered high risk: (1) You have diabetes OR (2) Family history of glaucoma OR (3)  aged 48 and older OR (3)  American aged 72 and older  5  Osteoporosis Screening: covered every 2 years if you meet one of the following conditions: (1) Have a vertebral abnormality; (2) On glucocorticoid therapy for more than 3 months; (3) Have primary hyperparathyroidism; (4) On osteoporosis medications and need to assess response to drug therapy  10  HIV Screening: covered annually if you're between the age of 12-76  Also covered annually if you are younger than 13 and older than 72 with risk factors for HIV infection  For pregnant patients, it is covered up to 3 times per pregnancy  Immunizations:  Immunization Recommendations   Influenza Vaccine Annual influenza vaccination during flu season is recommended for all persons aged >= 6 months who do not have contraindications   Pneumococcal Vaccine (Prevnar and Pneumovax)  * Prevnar = PCV13  * Pneumovax = PPSV23 Adults 25-60 years old: 1-3 doses may be recommended based on certain risk factors  Adults 72 years old: Prevnar (PCV13) vaccine recommended followed by Pneumovax (PPSV23) vaccine  If already received PPSV23 since turning 65, then PCV13 recommended at least one year after PPSV23 dose     Hepatitis B Vaccine 3 dose series if at intermediate or high risk (ex: diabetes, end stage renal disease, liver disease)   Tetanus (Td) Vaccine - COST NOT COVERED BY MEDICARE PART B Following completion of primary series, a booster dose should be given every 10 years to maintain immunity against tetanus  Td may also be given as tetanus wound prophylaxis  Tdap Vaccine - COST NOT COVERED BY MEDICARE PART B Recommended at least once for all adults  For pregnant patients, recommended with each pregnancy  Shingles Vaccine (Shingrix) - COST NOT COVERED BY MEDICARE PART B  2 shot series recommended in those aged 48 and above     Health Maintenance Due:      Topic Date Due    Hepatitis C Screening  Never done     Immunizations Due:      Topic Date Due    DTaP,Tdap,and Td Vaccines (1 - Tdap) Never done     Advance Directives   What are advance directives? Advance directives are legal documents that state your wishes and plans for medical care  These plans are made ahead of time in case you lose your ability to make decisions for yourself  Advance directives can apply to any medical decision, such as the treatments you want, and if you want to donate organs  What are the types of advance directives? There are many types of advance directives, and each state has rules about how to use them  You may choose a combination of any of the following:  · Living will: This is a written record of the treatment you want  You can also choose which treatments you do not want, which to limit, and which to stop at a certain time  This includes surgery, medicine, IV fluid, and tube feedings  · Durable power of  for healthcare [place] [facility]): This is a written record that states who you want to make healthcare choices for you when you are unable to make them for yourself  This person, called a proxy, is usually a family member or a friend  You may choose more than 1 proxy  · Do not resuscitate (DNR) order:  A DNR order is used in case your heart stops beating or you stop breathing  It is a request not to have certain forms of treatment, such as CPR  A DNR order may be included in other types of advance directives  · Medical directive:   This covers the care that you want if you are in a coma, near death, or unable to make decisions for yourself  You can list the treatments you want for each condition  Treatment may include pain medicine, surgery, blood transfusions, dialysis, IV or tube feedings, and a ventilator (breathing machine)  · Values history: This document has questions about your views, beliefs, and how you feel and think about life  This information can help others choose the care that you would choose  Why are advance directives important? An advance directive helps you control your care  Although spoken wishes may be used, it is better to have your wishes written down  Spoken wishes can be misunderstood, or not followed  Treatments may be given even if you do not want them  An advance directive may make it easier for your family to make difficult choices about your care  © Copyright [employer] 2018 Information is for End User's use only and may not be sold, redistributed or otherwise used for commercial purposes  All illustrations and images included in CareNotes® are the copyrighted property of [employer]  or [employer]       It is time to update her colonoscopy  Call their office and schedule this in the next year      IF YOU HAVE NEVER HAD A TDAP SHOT (TETANUS/DIPHTHERIA/PERTUSSIS)  TALK TO YOUR PHARMACIST ABOUT GETTING ONE : GOOD FOR 10 YRS:ESPECIALLY IMPORTATN IF YOU HAVE YOUNG CHILDREN OR GRANDCHILDREN    GET A YEARLY FLU SHOT IN THE FALL : IF OVER 65 GET "HIGH DOSE"     ASK PHARMACIST ABOUT "SHINGRIX" THE NEW SHINGLES VACCINE IF OVER AGE 50      IF YOU HAVE NEVER HAD A TDAP SHOT (TETANUS/DIPHTHERIA/PERTUSSIS)  TALK TO YOUR PHARMACIST ABOUT GETTING ONE : GOOD FOR 10 YRS:ESPECIALLY IMPORTATN IF YOU HAVE YOUNG CHILDREN OR GRANDCHILDREN    GET A YEARLY FLU SHOT IN THE FALL : IF OVER 65 GET "HIGH DOSE"     ASK PHARMACIST ABOUT "SHINGRIX" THE NEW SHINGLES VACCINE IF OVER AGE 48      Call Dr Carlotta Rocha for colon exam this year

## 2021-06-21 NOTE — PROGRESS NOTES
Assessment and Plan:     Problem List Items Addressed This Visit        Endocrine    Subclinical hypothyroidism - Primary (Chronic)       Cardiovascular and Mediastinum    Chronic coronary artery disease       Musculoskeletal and Integument    Primary localized osteoarthritis of knees, bilateral       Genitourinary    Adenocarcinoma of prostate ([facility] [address] )       Other    Hyperlipidemia      Other Visit Diagnoses     Need for hepatitis C screening test        Encounter for immunization               Preventive health issues were discussed with patient, and age appropriate screening tests were ordered as noted in patient's After Visit Summary  Personalized health advice and appropriate referrals for health education or preventive services given if needed, as noted in patient's After Visit Summary       History of Present Illness:     Patient presents for Medicare Annual Wellness visit    Patient Care Team:  Wendy Carl MD as PCP - General  MD Sy Mcgill MD Evon Boys, MD Donel Dibbles, MD     Problem List:     Patient Active Problem List   Diagnosis    Adenocarcinoma [facility] prostate [facility])    Chronic coronary artery disease    Hyperlipidemia    Nuclear sclerosis of right eye    Osteoporosis    Lung field abnormal    Primary localized osteoarthritis of knees, bilateral    Vitamin D deficiency    Dermatitis    Pruritus    Subclinical hypothyroidism    Abnormal nuclear stress test      Past Medical and Surgical History:     Past Medical History:   Diagnosis Date    Diverticulitis w/o hemorrhage 2008    Dyspepsia 2005    Nephrolithiasis     PVC (premature ventricular contraction) 03/30/2012     Past Surgical History:   Procedure Laterality Date    CATARACT EXTRACTION Right 06/2018    CATARACT EXTRACTION Left 08/30/2018    COLONOSCOPY W/ POLYPECTOMY  04/04/2006    PROSTATE BIOPSY  06/03/2016    Prostate Needle Biopsy     SHOULDER SURGERY Left     Arthrocentesis of the shoulder sub-=acronial bursa area    UMBILICAL HERNIA REPAIR      UPPER GASTROINTESTINAL ENDOSCOPY        Family History:     Family History   Problem Relation Age of Onset    Hypertension Mother     Stroke Mother     Diabetes Mother     Gout Father     Alzheimer's disease Father     Diabetes Family         Mellitus      Social History:     Social History     Socioeconomic History    Marital status:      Spouse name: None    Number of children: None    Years of education: None    Highest education level: None   Occupational History    None   Tobacco Use    Smoking status: Never Smoker    Smokeless tobacco: Never Used    Tobacco comment: no exposure to passive smoke   Vaping Use    Vaping Use: Never used   Substance and Sexual Activity    Alcohol use: No    Drug use: No    Sexual activity: Never   Other Topics Concern    None   Social History Narrative    None     Social Determinants of Health     Financial Resource Strain:     Difficulty of Paying Living Expenses:    Food Insecurity:     Worried About Running Out of Food in the Last Year:     Ran Out of Food in the Last Year:    Transportation Needs:     Lack of Transportation (Medical):      Lack of Transportation (Non-Medical):    Physical Activity:     Days of Exercise per Week:     Minutes of Exercise per Session:    Stress:     Feeling of Stress :    Social Connections:     Frequency of Communication with Friends and Family:     Frequency of Social Gatherings with Friends and Family:     Attends [employer] Services:     Active Member of Clubs or Organizations:     Attends Club or Organization Meetings:     Marital Status:    Intimate Partner Violence:     Fear of Current or Ex-Partner:     Emotionally Abused:     Physically Abused:     Sexually Abused:       Medications and Allergies:     Current Outpatient Medications   Medication Sig Dispense Refill    aspirin 81 MG tablet Take 1 tablet by mouth daily      atorvastatin (LIPITOR) 10 mg tablet Take 10 mg by mouth daily        Cholecalciferol (VITAMIN D3) 2000 units capsule Take 1 capsule by mouth daily      digoxin (LANOXIN) 0 125 mg tablet Take 1 tablet (125 mcg total) by mouth daily 90 tablet 3    nitroglycerin (NITROSTAT) 0 4 mg SL tablet PLEASE SEE ATTACHED FOR DETAILED DIRECTIONS  2    tamsulosin (FLOMAX) 0 4 mg Take 2 capsules (0 8 mg total) by mouth daily with dinner 180 capsule 1     No current facility-administered medications for this visit  Allergies   Allergen Reactions    Gold-Containing Drug Products Rash     1+ POSITIVE PATCH TEST    Parabens Rash     POSITIVE 1+ PATCH TEST      Immunizations:     Immunization History   Administered Date(s) Administered    INFLUENZA 12/24/2015, 12/24/2015, 02/16/2018, 10/26/2018    Influenza Split 12/26/2012    Influenza Split High Dose Preservative Free IM 02/16/2018    Influenza, high dose seasonal 0 7 mL 10/26/2018, 12/23/2019, 12/08/2020    Pneumococcal Conjugate 13-Valent 05/29/2015    Pneumococcal Polysaccharide PPV23 11/13/2008    SARS-CoV-2 / COVID-19 mRNA IM (Isreal Mcrae) 01/28/2021, 03/01/2021    Tuberculin Skin Test-PPD Intradermal 07/13/2007    Zoster 10/27/2014      Health Maintenance:         Topic Date Due    Hepatitis C Screening  Never done         Topic Date Due    DTaP,Tdap,and Td Vaccines (1 - Tdap) Never done      Medicare Health Risk Assessment:     /56 (BP Location: Left arm, Patient Position: Sitting, Cuff Size: Large)   Pulse 98   Temp 97 7 °F (36 5 °C) (Temporal)   Ht 5' 10 5" (1 791 m) Comment: with shoes  Wt 66 5 kg (146 lb 9 6 oz)   SpO2 100%   BMI 20 74 kg/m²      Odalis Colón is here for his Subsequent Wellness visit  Health Risk Assessment:   Patient rates overall health as good  Patient feels that their physical health rating is same  Patient is satisfied with their life  Eyesight was rated as same  Hearing was rated as same   Patient feels that their emotional and mental health rating is same  Patients states they are never, rarely angry  Patient states they are never, rarely unusually tired/fatigued  Pain experienced in the last 7 days has been none  Patient states that he has experienced no weight loss or gain in last 6 months  Depression Screening:   PHQ-2 Score: 0      Fall Risk Screening: In the past year, patient has experienced: no history of falling in past year      Home Safety:  Patient does not have trouble with stairs inside or outside of their home  Patient has working smoke alarms and has working carbon monoxide detector  Home safety hazards include: none  Nutrition:   Current diet is Regular  Medications:   Patient is currently taking over-the-counter supplements  OTC medications include: see medication list  Patient is able to manage medications  Activities of Daily Living (ADLs)/Instrumental Activities of Daily Living (IADLs):   Walk and transfer into and out of bed and chair?: Yes  Dress and groom yourself?: Yes    Bathe or shower yourself?: Yes    Feed yourself?  Yes  Do your laundry/housekeeping?: Yes  Manage your money, pay your bills and track your expenses?: Yes  Make your own meals?: Yes    Do your own shopping?: Yes    Previous Hospitalizations:   Any hospitalizations or ED visits within the last 12 months?: No      Advance Care Planning:   Living will: Yes    Advanced directive: Yes      PREVENTIVE SCREENINGS      Cardiovascular Screening:    General: Screening Not Indicated and History Lipid Disorder      Diabetes Screening:     General: Screening Current      Prostate Cancer Screening:    General: History Prostate Cancer and Screening Not Indicated      Osteoporosis Screening:    General: Screening Not Indicated and History Osteoporosis      Lung Cancer Screening:     General: Screening Not Indicated    Screening, Brief Intervention, and Referral to Treatment (SBIRT)    Screening  Typical number of drinks in a day: 0    Single Item Drug Screening:  How often have you used an illegal drug (including marijuana) or a prescription medication for non-medical reasons in the past year? never    Single Item Drug Screen Score: 0  Interpretation: Negative screen for possible drug use disorder      Candis Wright MD

## 2021-06-21 NOTE — PROGRESS NOTES
Assessment/Plan:    Vitamin D deficiency  Current level OK        Diagnoses and all orders for this visit:    Subclinical hypothyroidism    Need for hepatitis C screening test    Encounter for immunization    Chronic coronary artery disease    Primary localized osteoarthritis of knees, bilateral    Adenocarcinoma of prostate ([facility] [address] )    Hyperlipidemia, unspecified hyperlipidemia type    Other orders  -     Cancel: Hepatitis C Antibody ([facility]); Future  -     Cancel: TDAP VACCINE GREATER THAN OR EQUAL TO 6YO IM          Subjective:      Patient ID: Tonya Rea is a 66 y o  male  PATIENT RETURNS FOR FOLLOW-UP OF CHRONIC MEDICAL CONDITIONS  NO HOSPITAL STAYS OR EMERGENCY VISITS RECENTLY  MEDS WERE REVIEWED AND NO SIDE EFFECTS  NO NEW ISSUES  UNLESS NOTED BELOW  NO NEW MEDICAL PROVIDER REPORTED  THE CHRONIC DISEASES LISTED ABOVE ARE STABLE AND UNCHANGED/ THE PLAN OF CARE FOR THOSE WILL REMAIN UNCHANGED UNLESS NOTED BELOW       AWV/SUB      The following portions of the patient's history were reviewed and updated as appropriate: allergies, current medications, past family history, past medical history, past social history, past surgical history and problem list     Review of Systems   Constitutional: Negative for activity change and appetite change  HENT: Negative for trouble swallowing  Eyes: Negative for visual disturbance  Respiratory: Negative for cough and shortness of breath  Cardiovascular: Negative for chest pain, palpitations and leg swelling  Gastrointestinal: Negative for abdominal pain and blood in stool  Endocrine: Negative for polyuria  Genitourinary: Negative for difficulty urinating and hematuria  Skin: Negative for rash  Neurological: Negative for dizziness  Psychiatric/Behavioral: Negative for behavioral problems           Objective:  Vitals:    06/21/21 1001   BP: 102/56   BP Location: Left arm   Patient Position: Sitting   Cuff Size: Large   Pulse: 98   Temp: 97 7 °F (36 5 °C)   TempSrc: Temporal   SpO2: 100%   Weight: 66 5 kg (146 lb 9 6 oz)   Height: 5' 10 5" (1 791 m)      Physical Exam  Constitutional:       Appearance: He is well-developed  HENT:      Head: Normocephalic and atraumatic  Eyes:      Conjunctiva/sclera: Conjunctivae normal    Neck:      Thyroid: No thyromegaly  Cardiovascular:      Rate and Rhythm: Normal rate and regular rhythm  Heart sounds: Normal heart sounds  No murmur heard  Pulmonary:      Effort: Pulmonary effort is normal  No respiratory distress  Breath sounds: Normal breath sounds  Musculoskeletal:      Cervical back: Neck supple  Lymphadenopathy:      Cervical: No cervical adenopathy  Skin:     General: Skin is warm and dry  Psychiatric:         Behavior: Behavior normal         Assessment and Plan:     Problem List Items Addressed This Visit        Endocrine    Subclinical hypothyroidism - Primary (Chronic)       Cardiovascular and Mediastinum    Chronic coronary artery disease       Musculoskeletal and Integument    Primary localized osteoarthritis of knees, bilateral       Genitourinary    Adenocarcinoma of prostate ([facility] [address] )       Other    Hyperlipidemia      Other Visit Diagnoses     Need for hepatitis C screening test        Encounter for immunization               Preventive health issues were discussed with patient, and age appropriate screening tests were ordered as noted in patient's After Visit Summary  Personalized health advice and appropriate referrals for health education or preventive services given if needed, as noted in patient's After Visit Summary       History of Present Illness:     Patient presents for Medicare Annual Wellness visit    Patient Care Team:  Candis Wright MD as PCP - General  Laroy Edouard, MD Herschel Pleasant, MD Thirza Aase, MD Edman Broccoli, MD     Problem List:     Patient Active Problem List   Diagnosis    Aden[facility] of prostate [facility])    Chronic coronary artery disease    Hyperlipidemia    Nuclear sclerosis of right eye    Osteoporosis    Lung field abnormal    Primary localized osteoarthritis of knees, bilateral    Vitamin D deficiency    Dermatitis    Pruritus    Subclinical hypothyroidism    Abnormal nuclear stress test      Past Medical and Surgical History:     Past Medical History:   Diagnosis Date    Diverticulitis w/o hemorrhage 2008    Dyspepsia 2005    Nephrolithiasis     PVC (premature ventricular contraction) 03/30/2012     Past Surgical History:   Procedure Laterality Date    CATARACT EXTRACTION Right 06/2018    CATARACT EXTRACTION Left 08/30/2018    COLONOSCOPY W/ POLYPECTOMY  04/04/2006    PROSTATE BIOPSY  06/03/2016    Prostate Needle Biopsy     SHOULDER SURGERY Left     Arthrocentesis of the shoulder sub-=acronial bursa area    UMBILICAL HERNIA REPAIR      UPPER GASTROINTESTINAL ENDOSCOPY        Family History:     Family History   Problem Relation Age of Onset    Hypertension Mother     Stroke Mother     Diabetes Mother     Gout Father     Alzheimer's disease Father     Diabetes Family         Mellitus      Social History:     Social History     Socioeconomic History    Marital status:       Spouse name: None    Number of children: None    Years of education: None    Highest education level: None   Occupational History    None   Tobacco Use    Smoking status: Never Smoker    Smokeless tobacco: Never Used    Tobacco comment: no exposure to passive smoke   Vaping Use    Vaping Use: Never used   Substance and Sexual Activity    Alcohol use: No    Drug use: No    Sexual activity: Never   Other Topics Concern    None   Social History Narrative    None     Social Determinants of Health     Financial Resource Strain:     Difficulty of Paying Living Expenses:    Food Insecurity:     Worried About Running Out of Food in the Last Year:     Papo of Food in the Last Year:    Transportation Needs:     Lack of Transportation (Medical):  Lack of Transportation (Non-Medical):    Physical Activity:     Days of Exercise per Week:     Minutes of Exercise per Session:    Stress:     Feeling of Stress :    Social Connections:     Frequency of Communication with Friends and Family:     Frequency of Social Gatherings with Friends and Family:     Attends Buddhism Services:     Active Member of Clubs or Organizations:     Attends Club or Organization Meetings:     Marital Status:    Intimate Partner Violence:     Fear of Current or Ex-Partner:     Emotionally Abused:     Physically Abused:     Sexually Abused:       Medications and Allergies:     Current Outpatient Medications   Medication Sig Dispense Refill    aspirin 81 MG tablet Take 1 tablet by mouth daily      atorvastatin (LIPITOR) 10 mg tablet Take 10 mg by mouth daily        Cholecalciferol (VITAMIN D3) 2000 units capsule Take 1 capsule by mouth daily      digoxin (LANOXIN) 0 125 mg tablet Take 1 tablet (125 mcg total) by mouth daily 90 tablet 3    nitroglycerin (NITROSTAT) 0 4 mg SL tablet PLEASE SEE ATTACHED FOR DETAILED DIRECTIONS  2    tamsulosin (FLOMAX) 0 4 mg Take 2 capsules (0 8 mg total) by mouth daily with dinner 180 capsule 1     No current facility-administered medications for this visit       Allergies   Allergen Reactions    Gold-Containing Drug Products Rash     1+ POSITIVE PATCH TEST    Parabens Rash     POSITIVE 1+ PATCH TEST      Immunizations:     Immunization History   Administered Date(s) Administered    INFLUENZA 12/24/2015, 12/24/2015, 02/16/2018, 10/26/2018    Influenza Split 12/26/2012    Influenza Split High Dose Preservative Free IM 02/16/2018    Influenza, high dose seasonal 0 7 mL 10/26/2018, 12/23/2019, 12/08/2020    Pneumococcal Conjugate 13-Valent 05/29/2015    Pneumococcal Polysaccharide PPV23 11/13/2008    SARS-CoV-2 / COVID-19 mRNA IM (Moderna) 01/28/2021, 03/01/2021    Tuberculin Skin Test-PPD Intradermal 07/13/2007    Zoster 10/27/2014      Health Maintenance:         Topic Date Due    Hepatitis C Screening  Never done         Topic Date Due    DTaP,Tdap,and Td Vaccines (1 - Tdap) Never done      Medicare Health Risk Assessment:     /56 (BP Location: Left arm, Patient Position: Sitting, Cuff Size: Large)   Pulse 98   Temp 97 7 °F (36 5 °C) (Temporal)   Ht 5' 10 5" (1 791 m) Comment: with shoes  Wt 66 5 kg (146 lb 9 6 oz)   SpO2 100%   BMI 20 74 kg/m²      Edmond Hagan is here for his Subsequent Wellness visit  Last Medicare Wellness visit information reviewed, patient interviewed, no change since last AWV  Depression Screening:   PHQ-2 Score: 0      Previous Hospitalizations:   Any hospitalizations or ED visits within the last 12 months?: No      Advance Care Planning:   Living will: Yes    Durable POA for healthcare: Yes    Advanced directive: Yes      Cognitive Screening:   Provider or family/friend/caregiver concerned regarding cognition?: No    PREVENTIVE SCREENINGS      Cardiovascular Screening:    General: Screening Not Indicated, History Lipid Disorder and Screening Current      Diabetes Screening:     General: Screening Current      Colorectal Cancer Screening:     General: Risks and Benefits Discussed    Due for: Colonoscopy - Low Risk      Prostate Cancer Screening:    General: History Prostate Cancer, Screening Not Indicated and Screening Current      Osteoporosis Screening:    General: Screening Not Indicated and History Osteoporosis      Abdominal Aortic Aneurysm (AAA) Screening:        General: Screening Not Indicated      Lung Cancer Screening:     General: Screening Not Indicated      Hepatitis C Screening:    General: Risks and Benefits Discussed    Screening, Brief Intervention, and Referral to Treatment (SBIRT)      Brief Intervention  Alcohol & drug use screenings were reviewed  No concerns regarding substance use disorder identified       Other Counseling Topics:   Regular weightbearing exercise         Maria R Guerrero MD

## 2021-06-24 ENCOUNTER — TELEPHONE (OUTPATIENT)
Dept: DERMATOLOGY | Facility: CLINIC | Age: 76
End: 2021-06-24

## 2021-07-14 ENCOUNTER — OFFICE VISIT (OUTPATIENT)
Dept: OBGYN CLINIC | Facility: CLINIC | Age: 76
End: 2021-07-14
Payer: MEDICARE

## 2021-07-14 ENCOUNTER — APPOINTMENT (OUTPATIENT)
Dept: RADIOLOGY | Facility: CLINIC | Age: 76
End: 2021-07-14
Payer: MEDICARE

## 2021-07-14 VITALS
DIASTOLIC BLOOD PRESSURE: 68 MMHG | HEIGHT: 71 IN | WEIGHT: 145 LBS | BODY MASS INDEX: 20.3 KG/M2 | SYSTOLIC BLOOD PRESSURE: 116 MMHG

## 2021-07-14 DIAGNOSIS — M25.561 RIGHT KNEE PAIN, UNSPECIFIED CHRONICITY: ICD-10-CM

## 2021-07-14 DIAGNOSIS — M25.562 LEFT KNEE PAIN, UNSPECIFIED CHRONICITY: ICD-10-CM

## 2021-07-14 DIAGNOSIS — M17.11 PRIMARY LOCALIZED OSTEOARTHRITIS OF RIGHT KNEE: ICD-10-CM

## 2021-07-14 DIAGNOSIS — M17.12 PRIMARY OSTEOARTHRITIS OF LEFT KNEE: Primary | ICD-10-CM

## 2021-07-14 PROCEDURE — 99203 OFFICE O/P NEW LOW 30 MIN: CPT | Performed by: ORTHOPAEDIC SURGERY

## 2021-07-14 PROCEDURE — 73564 X-RAY EXAM KNEE 4 OR MORE: CPT

## 2021-07-14 NOTE — PROGRESS NOTES
Assessment:     1  Primary osteoarthritis of left knee    2  Primary localized osteoarthritis of right knee        Plan:     Problem List Items Addressed This Visit     None      Visit Diagnoses     Primary osteoarthritis of left knee    -  Primary    Relevant Orders    XR knee 4+ vw left injury    Ambulatory referral to Physical Therapy    Primary localized osteoarthritis of right knee        Relevant Orders    XR knee 4+ vw right injury    Ambulatory referral to Physical Therapy          Findings consistent with mild bilateral osteoarthritis  Imaging and prognosis reviewed with patient  Patient really has no pain in his knee's so will hold off on CSI today  Discussed patient needs to rehab his lower extremities to strengthen his knee, hip, core to help alleviate pain in his knee's  Avoid high impact activities  Stationary bike, elliptical for exercise  Stationary bike w/o resistance  Use tylenol for pain as needed  If symptoms worse he can call for cortisone injections  All patient's questions were answered to his satisfaction  This note is created using dictation transcription  It may contain typographical errors, grammatical errors, improperly dictated words, background noise and other errors  Subjective:     Patient ID: Felecia Lux is a 66 y o  male  Chief Complaint:  66 yr old male in for evaluation of bilateral knee pain  Seen 2017 by Dr Maura Jimenez and given CSI in left knee  Treating for known osteoarthritis of knee  No new injury or trauma  Recently pain has returned in both knee's, it will fluctuate to which knee bothers him more  Pain does move around the knee  Really has no pain today, but pain can come with activity or sitting  Sitting general dull ache  Denies any locking, instability  No recent treatment  Information on patient's intake form was reviewed      Allergy:  Allergies   Allergen Reactions    Gold-Containing Drug Products Rash     1+ POSITIVE PATCH TEST    Parabens Rash     POSITIVE 1+ PATCH TEST     Medications:  all current active meds have been reviewed  Past Medical History:  Past Medical History:   Diagnosis Date    Diverticulitis w/o hemorrhage 2008    Dyspepsia 2005    Nephrolithiasis     PVC (premature ventricular contraction) 03/30/2012     Past Surgical History:  Past Surgical History:   Procedure Laterality Date    CATARACT EXTRACTION Right 06/2018    CATARACT EXTRACTION Left 08/30/2018    COLONOSCOPY W/ POLYPECTOMY  04/04/2006    PROSTATE BIOPSY  06/03/2016    Prostate Needle Biopsy     SHOULDER SURGERY Left     Arthrocentesis of the shoulder sub-=acronial bursa area    UMBILICAL HERNIA REPAIR      UPPER GASTROINTESTINAL ENDOSCOPY       Family History:  Family History   Problem Relation Age of Onset    Hypertension Mother     Stroke Mother     Diabetes Mother     Gout Father     Alzheimer's disease Father     Diabetes Family         Mellitus     Social History:  Social History     Substance and Sexual Activity   Alcohol Use No     Social History     Substance and Sexual Activity   Drug Use No     Social History     Tobacco Use   Smoking Status Never Smoker   Smokeless Tobacco Never Used   Tobacco Comment    no exposure to passive smoke     Review of Systems   Constitutional: Negative for chills and fever  HENT: Negative for ear pain and sore throat  Eyes: Negative for pain and visual disturbance  Respiratory: Negative for cough and shortness of breath  Cardiovascular: Negative for chest pain and palpitations  Gastrointestinal: Negative for abdominal pain and vomiting  Genitourinary: Negative for dysuria and hematuria  Musculoskeletal: Positive for arthralgias (Bilateral knee intermittently) and back pain  Negative for gait problem and joint swelling  Skin: Negative for color change and rash  Neurological: Negative for seizures and syncope  Psychiatric/Behavioral: Negative  All other systems reviewed and are negative          Objective:  BP Readings from Last 1 Encounters:   07/14/21 116/68      Wt Readings from Last 1 Encounters:   07/14/21 65 8 kg (145 lb)      BMI:   Estimated body mass index is 20 51 kg/m² as calculated from the following:    Height as of this encounter: 5' 10 5" (1 791 m)  Weight as of this encounter: 65 8 kg (145 lb)  BSA:   Estimated body surface area is 1 83 meters squared as calculated from the following:    Height as of this encounter: 5' 10 5" (1 791 m)  Weight as of this encounter: 65 8 kg (145 lb)  Physical Exam  Vitals and nursing note reviewed  Constitutional:       Appearance: Normal appearance  He is well-developed  HENT:      Head: Normocephalic and atraumatic  Right Ear: External ear normal       Left Ear: External ear normal    Eyes:      Extraocular Movements: Extraocular movements intact  Conjunctiva/sclera: Conjunctivae normal    Pulmonary:      Effort: Pulmonary effort is normal    Musculoskeletal:      Cervical back: Neck supple  Right knee: No effusion  Instability Tests: Medial Fartun test negative and lateral Fartun test negative  Left knee: No effusion  Instability Tests: Medial Fartun test negative and lateral Fartun test negative  Skin:     General: Skin is warm  Neurological:      Mental Status: He is alert and oriented to person, place, and time  Psychiatric:         Mood and Affect: Mood normal          Behavior: Behavior normal        Right Knee Exam     Muscle Strength   The patient has normal right knee strength  Tenderness   The patient is experiencing no tenderness  Range of Motion   Extension: normal   Flexion: normal     Tests   Fartun:  Medial - negative Lateral - negative  Varus: negative Valgus: negative  Patellar apprehension: negative    Other   Erythema: absent  Scars: absent  Sensation: normal  Pulse: present  Swelling: none  Effusion: no effusion present    Comments:   Well tracking patella with crepitation       Left Knee Exam     Muscle Strength   The patient has normal left knee strength  Tenderness   The patient is experiencing no tenderness  Range of Motion   Extension: normal   Flexion: normal     Tests   Fartun:  Medial - negative Lateral - negative  Varus: negative Valgus: negative  Patellar apprehension: negative    Other   Erythema: absent  Scars: absent  Sensation: normal  Pulse: present  Swelling: none  Effusion: no effusion present    Comments: Well tracking patella with crepitation             I have personally reviewed pertinent films in PACS and my interpretation is xr right knee demonstrates mild degenerative changes throughtout , left knee moderate narrowing medial spurring, otherwise mild degenerative changes throughout        Scribe Attestation    I,:  Anival Hamilton am acting as a scribe while in the presence of the attending physician :       I,:  Carlos Vera MD personally performed the services described in this documentation    as scribed in my presence :

## 2021-08-17 DIAGNOSIS — N13.8 BPH WITH OBSTRUCTION/LOWER URINARY TRACT SYMPTOMS: ICD-10-CM

## 2021-08-17 DIAGNOSIS — N40.1 BPH WITH OBSTRUCTION/LOWER URINARY TRACT SYMPTOMS: ICD-10-CM

## 2021-08-17 RX ORDER — TAMSULOSIN HYDROCHLORIDE 0.4 MG/1
0.8 CAPSULE ORAL
Qty: 180 CAPSULE | Refills: 0 | Status: SHIPPED | OUTPATIENT
Start: 2021-08-17 | End: 2021-12-13 | Stop reason: SDUPTHER

## 2021-08-20 ENCOUNTER — TELEPHONE (OUTPATIENT)
Dept: UROLOGY | Facility: AMBULATORY SURGERY CENTER | Age: 76
End: 2021-08-20

## 2021-11-10 LAB — PSA SERPL-MCNC: 0.11 NG/ML

## 2021-11-15 ENCOUNTER — NURSE TRIAGE (OUTPATIENT)
Dept: OTHER | Facility: OTHER | Age: 76
End: 2021-11-15

## 2021-12-02 ENCOUNTER — HOSPITAL ENCOUNTER (OUTPATIENT)
Dept: MRI IMAGING | Facility: HOSPITAL | Age: 76
Discharge: HOME | End: 2021-12-02
Attending: SPECIALIST
Payer: MEDICARE

## 2021-12-02 DIAGNOSIS — H93.A2 PULSATILE TINNITUS OF LEFT EAR: ICD-10-CM

## 2021-12-02 PROCEDURE — 70544 MR ANGIOGRAPHY HEAD W/O DYE: CPT

## 2021-12-09 ENCOUNTER — TELEPHONE (OUTPATIENT)
Dept: OTHER | Facility: OTHER | Age: 76
End: 2021-12-09

## 2021-12-10 ENCOUNTER — TELEPHONE (OUTPATIENT)
Dept: UROLOGY | Facility: AMBULATORY SURGERY CENTER | Age: 76
End: 2021-12-10

## 2021-12-13 ENCOUNTER — OFFICE VISIT (OUTPATIENT)
Dept: UROLOGY | Facility: AMBULATORY SURGERY CENTER | Age: 76
End: 2021-12-13
Payer: MEDICARE

## 2021-12-13 VITALS
HEART RATE: 70 BPM | BODY MASS INDEX: 20.47 KG/M2 | DIASTOLIC BLOOD PRESSURE: 60 MMHG | SYSTOLIC BLOOD PRESSURE: 108 MMHG | WEIGHT: 143 LBS | HEIGHT: 70 IN

## 2021-12-13 DIAGNOSIS — C61 PROSTATE CANCER (HCC): ICD-10-CM

## 2021-12-13 DIAGNOSIS — N40.1 BPH WITH OBSTRUCTION/LOWER URINARY TRACT SYMPTOMS: Primary | ICD-10-CM

## 2021-12-13 DIAGNOSIS — Z87.448 HISTORY OF URETHRAL STRICTURE: ICD-10-CM

## 2021-12-13 DIAGNOSIS — N13.8 BPH WITH OBSTRUCTION/LOWER URINARY TRACT SYMPTOMS: Primary | ICD-10-CM

## 2021-12-13 LAB — POST-VOID RESIDUAL VOLUME, ML POC: 32 ML

## 2021-12-13 PROCEDURE — 51798 US URINE CAPACITY MEASURE: CPT | Performed by: NURSE PRACTITIONER

## 2021-12-13 PROCEDURE — 99214 OFFICE O/P EST MOD 30 MIN: CPT | Performed by: NURSE PRACTITIONER

## 2021-12-13 RX ORDER — ASPIRIN 81 MG/1
81 TABLET ORAL
COMMUNITY

## 2021-12-13 RX ORDER — TAMSULOSIN HYDROCHLORIDE 0.4 MG/1
0.8 CAPSULE ORAL
Qty: 180 CAPSULE | Refills: 3 | Status: SHIPPED | OUTPATIENT
Start: 2021-12-13

## 2021-12-13 RX ORDER — ATORVASTATIN CALCIUM 10 MG/1
10 TABLET, FILM COATED ORAL DAILY
COMMUNITY
Start: 2021-08-17 | End: 2021-12-20 | Stop reason: SDUPTHER

## 2021-12-20 ENCOUNTER — OFFICE VISIT (OUTPATIENT)
Dept: FAMILY MEDICINE CLINIC | Facility: CLINIC | Age: 76
End: 2021-12-20
Payer: MEDICARE

## 2021-12-20 VITALS
OXYGEN SATURATION: 100 % | WEIGHT: 144.4 LBS | SYSTOLIC BLOOD PRESSURE: 122 MMHG | TEMPERATURE: 97.2 F | BODY MASS INDEX: 20.72 KG/M2 | HEART RATE: 63 BPM | DIASTOLIC BLOOD PRESSURE: 78 MMHG

## 2021-12-20 DIAGNOSIS — I25.10 CHRONIC CORONARY ARTERY DISEASE: ICD-10-CM

## 2021-12-20 DIAGNOSIS — Z23 ENCOUNTER FOR IMMUNIZATION: ICD-10-CM

## 2021-12-20 DIAGNOSIS — E55.9 VITAMIN D DEFICIENCY: ICD-10-CM

## 2021-12-20 DIAGNOSIS — C61 ADENOCARCINOMA OF PROSTATE (HCC): ICD-10-CM

## 2021-12-20 DIAGNOSIS — M81.0 AGE RELATED OSTEOPOROSIS, UNSPECIFIED PATHOLOGICAL FRACTURE PRESENCE: ICD-10-CM

## 2021-12-20 DIAGNOSIS — E78.5 HYPERLIPIDEMIA, UNSPECIFIED HYPERLIPIDEMIA TYPE: ICD-10-CM

## 2021-12-20 DIAGNOSIS — E03.8 SUBCLINICAL HYPOTHYROIDISM: Primary | Chronic | ICD-10-CM

## 2021-12-20 PROCEDURE — 99214 OFFICE O/P EST MOD 30 MIN: CPT | Performed by: FAMILY MEDICINE

## 2021-12-22 ENCOUNTER — IMMUNIZATIONS (OUTPATIENT)
Dept: FAMILY MEDICINE CLINIC | Facility: HOSPITAL | Age: 76
End: 2021-12-22

## 2021-12-22 DIAGNOSIS — Z23 ENCOUNTER FOR IMMUNIZATION: Primary | ICD-10-CM

## 2021-12-22 PROCEDURE — 91306 COVID-19 MODERNA VACC 0.25 ML BOOSTER: CPT

## 2021-12-22 PROCEDURE — 0064A COVID-19 MODERNA VACC 0.25 ML BOOSTER: CPT

## 2022-01-27 ENCOUNTER — HOSPITAL ENCOUNTER (OUTPATIENT)
Dept: CT IMAGING | Facility: HOSPITAL | Age: 77
Discharge: HOME | End: 2022-01-27
Attending: SPECIALIST
Payer: MEDICARE

## 2022-01-27 ENCOUNTER — HOSPITAL ENCOUNTER (OUTPATIENT)
Dept: MRI IMAGING | Facility: HOSPITAL | Age: 77
End: 2022-01-27
Attending: SPECIALIST
Payer: MEDICARE

## 2022-01-27 DIAGNOSIS — I67.1 CEREBRAL ANEURYSM, NONRUPTURED: ICD-10-CM

## 2022-01-27 DIAGNOSIS — H93.A2 PULSATILE TINNITUS OF LEFT EAR: ICD-10-CM

## 2022-01-27 PROCEDURE — G1004 CDSM NDSC: HCPCS

## 2022-01-27 PROCEDURE — 70496 CT ANGIOGRAPHY HEAD: CPT

## 2022-01-27 RX ADMIN — IOHEXOL 85 ML: 350 INJECTION, SOLUTION INTRAVENOUS at 19:27

## 2022-03-11 ENCOUNTER — TELEPHONE (OUTPATIENT)
Dept: FAMILY MEDICINE CLINIC | Facility: CLINIC | Age: 77
End: 2022-03-11

## 2022-03-13 ENCOUNTER — TREATMENT (OUTPATIENT)
Dept: FAMILY MEDICINE CLINIC | Facility: CLINIC | Age: 77
End: 2022-03-13

## 2022-10-18 ENCOUNTER — TELEPHONE (OUTPATIENT)
Dept: OTHER | Facility: OTHER | Age: 77
End: 2022-10-18

## 2022-10-18 DIAGNOSIS — N13.8 BPH WITH OBSTRUCTION/LOWER URINARY TRACT SYMPTOMS: Primary | ICD-10-CM

## 2022-10-18 DIAGNOSIS — N40.1 BPH WITH OBSTRUCTION/LOWER URINARY TRACT SYMPTOMS: Primary | ICD-10-CM

## 2022-10-18 NOTE — TELEPHONE ENCOUNTER
"I got a post card in the mail that states I need to get a PSA done and make an appointment "    Patient is requesting that a paper prescription for a PSA be sent in the mail he does not want to go to [facility]   He also is requesting to make an appointment he will be available today until 4pm

## 2022-10-24 ENCOUNTER — TELEPHONE (OUTPATIENT)
Dept: FAMILY MEDICINE CLINIC | Facility: CLINIC | Age: 77
End: 2022-10-24

## 2022-11-09 ENCOUNTER — TRANSCRIBE ORDERS (OUTPATIENT)
Dept: FAMILY MEDICINE CLINIC | Facility: CLINIC | Age: 77
End: 2022-11-09

## 2022-11-09 DIAGNOSIS — R05.9 COUGH, UNSPECIFIED TYPE: Primary | ICD-10-CM

## 2022-11-09 NOTE — PROGRESS NOTES
Cough x 3 wks ; wants ct as had nodules 10-12 yrs ago ;     I suggested a new CXR first [facility]

## 2022-11-28 LAB
CREAT ?TM UR-SCNC: 188 UMOL/L
EXT MICROALBUMIN URINE RANDOM: 1.2
MICROALBUMIN/CREAT UR: 6 MG/G{CREAT}
PSA SERPL-MCNC: 0.11 NG/ML

## 2022-11-29 ENCOUNTER — TELEPHONE (OUTPATIENT)
Dept: PULMONOLOGY | Facility: CLINIC | Age: 77
End: 2022-11-29

## 2022-11-29 NOTE — TELEPHONE ENCOUNTER
Received outside imaging disc from patient sent over to Radiology to have scanned in  Patient also attached a letter for Dr Maricruz Peters  The letter was sent over to centralized faxing to scan into the chart I don't see that patient has been seen with us before but he said he has  Letter should be in the chart shortly

## 2022-12-08 ENCOUNTER — OFFICE VISIT (OUTPATIENT)
Dept: FAMILY MEDICINE CLINIC | Facility: CLINIC | Age: 77
End: 2022-12-08

## 2022-12-08 VITALS
OXYGEN SATURATION: 99 % | TEMPERATURE: 97.7 F | BODY MASS INDEX: 20.14 KG/M2 | HEIGHT: 70 IN | SYSTOLIC BLOOD PRESSURE: 136 MMHG | HEART RATE: 84 BPM | RESPIRATION RATE: 16 BRPM | WEIGHT: 140.7 LBS | DIASTOLIC BLOOD PRESSURE: 68 MMHG

## 2022-12-08 DIAGNOSIS — J06.9 UPPER RESPIRATORY TRACT INFECTION, UNSPECIFIED TYPE: ICD-10-CM

## 2022-12-08 DIAGNOSIS — E03.8 SUBCLINICAL HYPOTHYROIDISM: Primary | ICD-10-CM

## 2022-12-08 DIAGNOSIS — Z23 ENCOUNTER FOR IMMUNIZATION: ICD-10-CM

## 2022-12-08 RX ORDER — GUAIFENESIN AND CODEINE PHOSPHATE 100; 10 MG/5ML; MG/5ML
5 SOLUTION ORAL 3 TIMES DAILY PRN
Qty: 118 ML | Refills: 0 | Status: SHIPPED | OUTPATIENT
Start: 2022-12-08

## 2022-12-08 NOTE — ASSESSMENT & PLAN NOTE
· On recent blood work from 11/20/2022, patient's TSH is elevated at 8 24  · There is not a T4 reflex completed    Previous T4's have been within normal limits  · Follow-up TSH with T4 to determine if patient will require thyroid supplementation  · Patient denies symptoms of hypothyroidism

## 2022-12-08 NOTE — PROGRESS NOTES
Assessment/Plan:      1  Subclinical hypothyroidism  Assessment & Plan: On recent blood work from 11/20/2022, patient's TSH is elevated at 8 24  There is not a T4 reflex completed  Previous T4's have been within normal limits  Follow-up TSH with T4 to determine if patient will require thyroid supplementation  Patient denies symptoms of hypothyroidism    Orders:  -     TSH, 3rd generation with Free T4 reflex; Future    2  Upper respiratory tract infection, unspecified type  -     guaifenesin-codeine (GUAIFENESIN AC) 100-10 MG/5ML liquid; Take 5 mL by mouth 3 (three) times a day as needed for cough    3  Encounter for immunization  -     influenza vaccine, high-dose, PF 0 7 mL (FLUZONE HIGH-DOSE)            Subjective:      Patient ID: Parul Santana is a 76 y o  male presents today to discuss blood work  He was seen by his cardiologist who discussed the majority of his blood work  His TSH was elevated and was told to follow-up with his PCP for further evaluation  Has had a cold for about 2 weeks which is improving  Continues to have a night time cough which is preventing him     HPI    The following portions of the patient's history were reviewed and updated as appropriate: allergies, current medications, past family history, past medical history, past social history, past surgical history and problem list     Review of Systems   Constitutional: Negative for activity change, chills, diaphoresis and fever  HENT: Negative for ear pain, hearing loss, postnasal drip, rhinorrhea, sinus pressure, sinus pain, sneezing and sore throat  Respiratory: Negative for cough, chest tightness, shortness of breath and wheezing  Cardiovascular: Negative for chest pain, palpitations and leg swelling  Gastrointestinal: Negative for abdominal pain, blood in stool, constipation, diarrhea, nausea and vomiting  Genitourinary: Negative for dysuria, frequency, hematuria and urgency     Musculoskeletal: Negative for arthralgias and myalgias  Neurological: Negative for dizziness, syncope, weakness, light-headedness, numbness and headaches  Psychiatric/Behavioral: Negative for dysphoric mood  The patient is not nervous/anxious  Objective:      /68 (BP Location: Left arm, Patient Position: Sitting, Cuff Size: Large)   Pulse 84   Temp 97 7 °F (36 5 °C)   Resp 16   Ht 5' 10" (1 778 m)   Wt 63 8 kg (140 lb 11 2 oz)   SpO2 99%   BMI 20 19 kg/m²          Physical Exam  Vitals reviewed  Constitutional:       General: He is not in acute distress  Appearance: He is well-developed  He is not diaphoretic  HENT:      Head: Normocephalic and atraumatic  Right Ear: External ear normal       Left Ear: External ear normal       Nose: Nose normal  No congestion  Mouth/Throat:      Mouth: Mucous membranes are moist       Pharynx: Oropharynx is clear  No oropharyngeal exudate  Eyes:      General: No scleral icterus  Pupils: Pupils are equal, round, and reactive to light  Neck:      Thyroid: No thyromegaly  Vascular: No JVD  Trachea: No tracheal deviation  Cardiovascular:      Rate and Rhythm: Normal rate and regular rhythm  Pulses: Normal pulses  Heart sounds: Normal heart sounds  No murmur heard  No friction rub  Pulmonary:      Effort: Pulmonary effort is normal  No respiratory distress  Breath sounds: Normal breath sounds  No wheezing or rales  Chest:      Chest wall: No tenderness  Abdominal:      General: Bowel sounds are normal  There is no distension  Palpations: Abdomen is soft  Tenderness: There is no abdominal tenderness  There is no right CVA tenderness, left CVA tenderness, guarding or rebound  Musculoskeletal:         General: No tenderness  Normal range of motion  Cervical back: Normal range of motion and neck supple  No tenderness  Right lower leg: No edema  Left lower leg: No edema     Lymphadenopathy:      Cervical: No cervical adenopathy  Skin:     General: Skin is warm and dry  Neurological:      Mental Status: He is alert and oriented to person, place, and time  Mental status is at baseline  Deep Tendon Reflexes: Reflexes are normal and symmetric  Psychiatric:         Mood and Affect: Mood normal          Behavior: Behavior normal          Thought Content:  Thought content normal          Judgment: Judgment normal

## 2022-12-09 ENCOUNTER — TELEPHONE (OUTPATIENT)
Dept: UROLOGY | Facility: AMBULATORY SURGERY CENTER | Age: 77
End: 2022-12-09

## 2022-12-09 NOTE — TELEPHONE ENCOUNTER
Per the OV note from 12/13/21   History of urethral stricture  ? PVR 32 ml  ? Uroflow as below  ?  Schedule cystoscopy with next office visit, per pt request  He is aware to call for a sooner appointment for cysto if his urinary symptoms worsen

## 2022-12-09 NOTE — TELEPHONE ENCOUNTER
Name: Sully Crabtree     Date: 1/19/2023     Arrival Time 1:15 PM     Visit Type: UROLOGY CYSTO PROCEDURE PG [[id number]]     Verbally confirmed the above information with the patient

## 2022-12-09 NOTE — TELEPHONE ENCOUNTER
Pt called and stated that he made a f/u appointment and was told that he needed a cysto as well  Please advice if this is something that he needs to be done       Pt can be reached at [phone]

## 2022-12-13 ENCOUNTER — TELEPHONE (OUTPATIENT)
Dept: FAMILY MEDICINE CLINIC | Facility: CLINIC | Age: 77
End: 2022-12-13

## 2022-12-13 NOTE — TELEPHONE ENCOUNTER
Patient would like to get a phone call from you regarding a pulmonologist  Please advise he would like to get a call today  Patient is aware provider is with patient and might get call tomorrow

## 2022-12-16 DIAGNOSIS — R91.8 LUNG NODULES: Primary | ICD-10-CM

## 2022-12-16 NOTE — PROGRESS NOTES
Patient reports history of lung nodules and is requesting repeat chest ct  Reviewed chart and patient had a ct scan of chest 10/2/2013 which showed 6mm nodule in RLL and 4mm nodule LLL  Patient reports unchanged cough   Follow up repeat ct scan chest
Clothing

## 2022-12-28 ENCOUNTER — OFFICE VISIT (OUTPATIENT)
Dept: UROLOGY | Facility: AMBULATORY SURGERY CENTER | Age: 77
End: 2022-12-28

## 2022-12-28 VITALS
BODY MASS INDEX: 20.36 KG/M2 | OXYGEN SATURATION: 100 % | DIASTOLIC BLOOD PRESSURE: 70 MMHG | WEIGHT: 142.2 LBS | HEART RATE: 77 BPM | SYSTOLIC BLOOD PRESSURE: 110 MMHG | HEIGHT: 70 IN

## 2022-12-28 DIAGNOSIS — N13.8 BPH WITH OBSTRUCTION/LOWER URINARY TRACT SYMPTOMS: ICD-10-CM

## 2022-12-28 DIAGNOSIS — N40.1 BPH WITH OBSTRUCTION/LOWER URINARY TRACT SYMPTOMS: ICD-10-CM

## 2022-12-28 DIAGNOSIS — C61 PROSTATE CANCER (HCC): Primary | ICD-10-CM

## 2022-12-28 RX ORDER — TAMSULOSIN HYDROCHLORIDE 0.4 MG/1
0.8 CAPSULE ORAL
Qty: 180 CAPSULE | Refills: 3 | Status: SHIPPED | OUTPATIENT
Start: 2022-12-28

## 2022-12-28 NOTE — PROGRESS NOTES
12/28/2022      Chief Complaint   Patient presents with   • Follow-up     Assessment and Plan    76 y o  male managed by Dr Serjio Tapia    1  Prostate cancer  · S/p radiation therapy 10/2016  · PSA performed 11/28/2022 resulted 0 11  · TOSIN-flat smooth prostate with no nodules  · Repeat PSA and TOSIN in 1 year    2  History of urethral stricture  · Will continue to monitor for stream characteristics, if changes consider cystoscopy and possible dilatation, if warranted    · No current change to urinary stream characteristics-full stream, no change in length of time for urinating      3   Benign prostatic hyperplasia with lower urinary tract symptoms  · Continue tamsulosin  · To monitor for symptoms progression of lower urinary tract symptoms    History of Present Illness  Heri Davalos is a 76 y o  male here for follow up evaluation of  [place] [facility] prostate cancer   Patient is status post radiation therapy 10/2016  [facility] also has a history of urethral stricture and benign prostatic hyperplasia with lower urinary tract symptoms   Regarding urinary symptoms, he has been managed well on tamsulosin 0 8 mg p o  daily  [facility] wishes to reduce the amount of tamsulosin he is taking per day to see if there is change in his urinary symptoms   Patient reports his urinary stream to be strong with complete sensation of bladder emptying after urinating   He reports getting up 2 times at night to urinate  [facility] reports these changes to be an improvement over his last office evaluation   PSA trend below  [facility] currently denies changes to his general health since prior office evaluation  [facility] is very happy with his current urinary status         Component       PSA, Total   Latest Ref Rng & Units       < OR = 4 00 ng/mL   4/17/2018      3:06 PM 0 4   10/24/2018      9:42 AM 0 3   5/6/2019      12:49 PM 0 2   11/7/2019      2:29 PM 0 3   5/1/2020      12:54 PM 0 2   11/6/2020      9:06 AM 0 2   5/25/2021      7:05 AM 0 2   11/10/2021      8:13 AM 0 11   11/28/2022      7:40 AM 0 11       Review of Systems   Constitutional: Negative for chills and fever  Respiratory: Negative for cough and shortness of breath  Cardiovascular: Negative for chest pain  Gastrointestinal: Negative for abdominal distention, abdominal pain, blood in stool, nausea and vomiting  Genitourinary: Negative for difficulty urinating, dysuria, enuresis, flank pain, frequency, hematuria and urgency  Skin: Negative for rash  Past Medical History  Past Medical History:   Diagnosis Date   • Diverticulitis w/o hemorrhage 2008   • Dyspepsia 2005   • Nephrolithiasis    • PVC (premature ventricular contraction) 03/30/2012       Past Social History  Past Surgical History:   Procedure Laterality Date   • CATARACT EXTRACTION Right 06/2018   • CATARACT EXTRACTION Left 08/30/2018   • COLONOSCOPY W/ POLYPECTOMY  04/04/2006   • PROSTATE BIOPSY  06/03/2016    Prostate Needle Biopsy    • SHOULDER SURGERY Left     Arthrocentesis of the shoulder sub-=acronial bursa area   • UMBILICAL HERNIA REPAIR     • UPPER GASTROINTESTINAL ENDOSCOPY       Social History     Tobacco Use   Smoking Status Never   Smokeless Tobacco Never   Tobacco Comments    no exposure to passive smoke       Past Family History  Family History   Problem Relation Age of Onset   • Hypertension Mother    • Stroke Mother    • Diabetes Mother    • Gout Father    • Alzheimer's disease Father    • Diabetes Family         Mellitus       Past Social history  Social History     Socioeconomic History   • Marital status:      Spouse name: Not on file   • Number of children: Not on file   • Years of education: Not on file   • Highest education level: Not on file   Occupational History   • Occupation: retired - elect[employer]    Tobacco Use   • Smoking status: Never   • Smokeless tobacco: Never   • Tobacco comments:     no exposure to passive smoke   Vaping Use   • Vaping Use: Never used   Substance and Sexual Activity   • Alcohol use:  No • Drug use: No   • Sexual activity: Never   Other Topics Concern   • Not on file   Social History Narrative   • Not on file     Social Determinants of Health     Financial Resource Strain: Not on file   Food Insecurity: Not on file   Transportation Needs: Not on file   Physical Activity: Not on file   Stress: Not on file   Social Connections: Not on file   Intimate Partner Violence: Not on file   Housing Stability: Not on file       Current Medications  Current Outpatient Medications   Medication Sig Dispense Refill   • aspirin (ECOTRIN LOW STRENGTH) 81 mg EC tablet Take 81 mg by mouth     • atorvastatin (LIPITOR) 10 mg tablet Take 10 mg by mouth daily       • Cholecalciferol (VITAMIN D3) 2000 units capsule Take 1 capsule by mouth daily     • digoxin (LANOXIN) 0 125 mg tablet Take 1 tablet (125 mcg total) by mouth daily 90 tablet 3   • guaifenesin-codeine (GUAIFENESIN AC) 100-10 MG/5ML liquid Take 5 mL by mouth 3 (three) times a day as needed for cough 118 mL 0   • nitroglycerin (NITROSTAT) 0 4 mg SL tablet PLEASE SEE ATTACHED FOR DETAILED DIRECTIONS  2   • tamsulosin (FLOMAX) 0 4 mg Take 2 capsules (0 8 mg total) by mouth daily with dinner 180 capsule 3     No current facility-administered medications for this visit  Allergies  Allergies   Allergen Reactions   • Gold-Containing Drug Products Rash     1+ POSITIVE PATCH TEST   • Parabens Rash     POSITIVE 1+ PATCH TEST         The following portions of the patient's history were reviewed and updated as appropriate: allergies, current medications, past medical history, past social history, past surgical history and problem list       Vitals  Vitals:    12/28/22 0919   BP: 110/70   BP Location: Left arm   Patient Position: Sitting   Cuff Size: Standard   Pulse: 77   SpO2: 100%   Weight: 64 5 kg (142 lb 3 2 oz)   Height: 5' 10" (1 778 m)           Physical Exam  Physical Exam  Vitals reviewed  Constitutional:       General: He is not in acute distress  Appearance: Normal appearance  He is normal weight  HENT:      Head: Normocephalic  Pulmonary:      Effort: No respiratory distress  Breath sounds: Normal breath sounds  Skin:     General: Skin is warm and dry  Neurological:      General: No focal deficit present  Mental Status: He is alert and oriented to person, place, and time     Psychiatric:         Mood and Affect: Mood normal          Behavior: Behavior normal            Results  No results found for this or any previous visit (from the past 1 hour(s)) ]  Lab Results   Component Value Date    PSA 0 11 11/28/2022    PSA 0 11 11/10/2021    PSA 0 2 05/25/2021     Lab Results   Component Value Date    CALCIUM 9 1 11/06/2020     06/18/2016    K 4 1 11/06/2020    CO2 30 11/06/2020     11/06/2020    BUN 20 11/06/2020    CREATININE 0 94 11/06/2020     Lab Results   Component Value Date    WBC 5 6 11/06/2020    HGB 13 9 11/06/2020    HCT 42 7 11/06/2020    MCV 89 5 11/06/2020     11/06/2020     Orders  Orders Placed This Encounter   Procedures   • PSA Total, Diagnostic     Standing Status:   Future     Standing Expiration Date:   12/28/2023       [employer]

## 2023-01-06 ENCOUNTER — HOSPITAL ENCOUNTER (OUTPATIENT)
Dept: RADIOLOGY | Facility: HOSPITAL | Age: 78
Discharge: HOME | End: 2023-01-06
Attending: FAMILY MEDICINE

## 2023-01-06 DIAGNOSIS — R91.8 LUNG NODULES: ICD-10-CM

## 2023-01-11 NOTE — ADDENDUM NOTE
SUBJECTIVE:   CC: Lilian is an 55 year old who presents for preventive health visit.   Patient has been advised of split billing requirements and indicates understanding: Yes  Healthy Habits:     Getting at least 3 servings of Calcium per day:  Yes    Bi-annual eye exam:  NO    Dental care twice a year:  NO    Sleep apnea or symptoms of sleep apnea:  None    Diet:  Regular (no restrictions)    Frequency of exercise:  6-7 days/week    Duration of exercise:  Greater than 60 minutes    Taking medications regularly:  Yes    Medication side effects:  None    PHQ-2 Total Score: 0    Additional concerns today:  No    Due for mammogram and pap ( had inadequate sampling)  UTD on colon cancer screen   Due for influenza vaccine and covid booster    HTN--not well controlled. In a lot of pain from fibromyalgia. Taking lisinopril-hydrochlorothiazide 10-12.5 mg daily. Does not miss doses     Fibromyalgia chronic, not well controlled. Taking lyrica 75 mg BID, gabapentin 300-600 mg BID PRN          -------------------------------------    Today's PHQ-2 Score:   PHQ-2 (  [employer]) 2023   Q1: Little interest or pleasure in doing things 0   Q2: Feeling down, depressed or hopeless 0   PHQ-2 Score 0   PHQ-2 Total Score (12-17 Years)- Positive if 3 or more points; Administer PHQ-A if positive -   Q1: Little interest or pleasure in doing things Not at all   Q2: Feeling down, depressed or hopeless Not at all   PHQ-2 Score 0           Social History     Tobacco Use     Smoking status: Every Day     Packs/day: 0.25     Years: 20.00     Pack years: 5.00     Types: Cigarettes     Last attempt to quit: 3/18/2018     Years since quittin.8     Smokeless tobacco: Never     Tobacco comments:     clove cigarettes   Substance Use Topics     Alcohol use: Yes     Comment: 1-2 per year         Alcohol Use 2023   Prescreen: >3 drinks/day or >7 drinks/week? No       Reviewed orders with patient.  Reviewed health maintenance and  Addended by: Roger Valentino on: 10/17/2019 01:03 PM     Modules accepted: Orders updated orders accordingly - Yes  Lab work is in process    Breast Cancer Screening:  Any new diagnosis of family breast, ovarian, or bowel cancer? No    FHS-7: No flowsheet data found.    Mammogram Screening: Recommended mammography every 1-2 years with patient discussion and risk factor consideration  Pertinent mammograms are reviewed under the imaging tab.    History of abnormal Pap smear: NO - age 30-65 PAP every 5 years with negative HPV co-testing recommended  PAP / HPV Latest Ref Rng & Units 11/20/2020   PAP (Historical) - UNSAT   HPV16 NEG:Negative Negative   HPV18 NEG:Negative Negative   HRHPV NEG:Negative Negative     Reviewed and updated as needed this visit by clinical staff   Tobacco  Allergies  Meds              Reviewed and updated as needed this visit by Provider     Meds             Past Medical History:   Diagnosis Date     Borderline hypertension      H/O bee sting allergy      Osteoarthritis      PUD (peptic ulcer disease)      Seasonal allergies       Past Surgical History:   Procedure Laterality Date     COLONOSCOPY N/A 12/15/2020    1 hyperplastic, 1 tubular adenoma, follow up 5 years, 12/15/2025     RECONSTRUCT FOREFOOT WITH METATARSOPHALANGEAL (MTP) FUSION Left 12/18/2020    Procedure: RECONSTRUCTION, FOREFOOT, WITH MTP JOINT FUSION, 2nd metatarsal head resection;  Surgeon: Conrad Parish DPM;  Location:  OR       Review of Systems  CONSTITUTIONAL: NEGATIVE for fever, chills, change in weight  INTEGUMENTARY/SKIN: NEGATIVE for worrisome rashes, moles or lesions  EYES: NEGATIVE for vision changes or irritation  ENT: NEGATIVE for ear, mouth and throat problems  RESP: NEGATIVE for significant cough or SOB  BREAST: NEGATIVE for masses, tenderness or discharge  CV: NEGATIVE for chest pain, palpitations or peripheral edema  GI: NEGATIVE for nausea, abdominal pain, heartburn, or change in bowel habits  : NEGATIVE for unusual urinary or vaginal symptoms. No vaginal  "bleeding.  MUSCULOSKELETAL: NEGATIVE for significant arthralgias or myalgia  NEURO: NEGATIVE for weakness, dizziness or paresthesias  PSYCHIATRIC: NEGATIVE for changes in mood or affect      OBJECTIVE:   BP (!) 164/90 (BP Location: Right arm, Patient Position: Sitting, Cuff Size: Adult Large)   Pulse 78   Temp 99.6  F (37.6  C) (Tympanic)   Resp 18   Ht 1.727 m (5' 8\")   Wt 78.5 kg (173 lb)   LMP  (LMP Unknown)   SpO2 99%   BMI 26.30 kg/m    Physical Exam  GENERAL: healthy, alert and no distress  EYES: Eyes grossly normal to inspection, PERRL and conjunctivae and sclerae normal  HENT: ear canals and TM's normal, nose and mouth without ulcers or lesions  RESP: lungs clear to auscultation - no rales, rhonchi or wheezes  CV: regular rate and rhythm, normal S1 S2, no S3 or S4, no murmur, click or rub, no peripheral edema and peripheral pulses strong  ABDOMEN: soft, nontender, no hepatosplenomegaly, no masses and bowel sounds normal   (female): normal female external genitalia, normal urethral meatus, vaginal mucosa, normal cervix/adnexa/uterus without masses or discharge  MS: multiple muscular areas of pain   SKIN: no suspicious lesions or rashes  NEURO: Normal strength and tone, mentation intact and speech normal  PSYCH: mentation appears normal, affect normal/bright    Diagnostic Test Results:  Labs reviewed in Epic    ASSESSMENT/PLAN:   Lilian was seen today for physical.    Diagnoses and all orders for this visit:    Routine general medical examination at a health care facility    Screening for cervical cancer  -     Pap Screen with HPV - recommended age 30 - 65 years    Need for hepatitis C screening test  Declines     Screening mammogram for high-risk patient  Patient wants to call insurance to discuss of breast MRI is covered.     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 Increase lyrica from 75 mg BID to 150 mg BID. Continue PRN gabapentin   -     pregabalin (LYRICA) 150 MG capsule; Take 1 capsule (150 mg) by mouth 2 times daily    Need for COVID-19 vaccine  Booster given today    Need for Tdap vaccination  Declines     Essential hypertension  Not well controlled. Plan to increase dose from 10-12.5 mg daily to 20-25 mg daily. Control pain as above. Smoking cessation would help   -     lisinopril-hydrochlorothiazide (ZESTORETIC) 20-25 MG tablet; Take 1 tablet by mouth daily  -     Basic Metabolic Panel; Future  -     Basic Metabolic Panel    Other orders  -     COVID-19 VACCINE BIVALENT BOOSTER 12+ (PFIZER)        Patient has been advised of split billing requirements and indicates understanding: Yes      COUNSELING:  Reviewed preventive health counseling, as reflected in patient instructions       Regular exercise       Healthy diet/nutrition       Osteoporosis prevention/bone health       Colorectal Cancer Screening       Consider Hep C screening for all patients one time for ages 18-79 years       HIV screeninx in teen years, 1x in adult years, and at intervals if high risk      BMI:   Estimated body mass index is 26.3 kg/m  as calculated from the following:    Height as of this encounter: 1.727 m (5' 8\").    Weight as of this encounter: 78.5 kg (173 lb).         She reports that she has been smoking cigarettes. She has a 5.00 pack-year smoking history. She has never used smokeless tobacco.  Nicotine/Tobacco Cessation Plan:   Information offered: Patient not interested at this time      Austin Tejeda MD  [facility]  Answers for HPI/ROS submitted by the patient on 2023  If you checked off any problems, how difficult have these problems made it for you to do your work, take care of things at home, or get along with other people?: Not difficult at all  PHQ9 TOTAL SCORE: 3  ROSANA 7 TOTAL SCORE: 0      "

## 2023-01-12 ENCOUNTER — TELEPHONE (OUTPATIENT)
Dept: FAMILY MEDICINE CLINIC | Facility: CLINIC | Age: 78
End: 2023-01-12

## 2023-01-12 NOTE — TELEPHONE ENCOUNTER
Patient called he would like for you to put the complete order for TSH  He states once the order is in to mail it to him   Please advise

## 2023-01-13 NOTE — TELEPHONE ENCOUNTER
Alyson Buck already an order for this in his chart  If he goes to [address] [place] [address] lab he does not need the paper order  If he goes somewhere else please mail him the slip

## 2023-01-20 ENCOUNTER — TELEPHONE (OUTPATIENT)
Dept: FAMILY MEDICINE CLINIC | Facility: CLINIC | Age: 78
End: 2023-01-20

## 2023-01-20 ENCOUNTER — TELEPHONE (OUTPATIENT)
Dept: OTHER | Facility: OTHER | Age: 78
End: 2023-01-20

## 2023-01-20 NOTE — TELEPHONE ENCOUNTER
Patient calling regarding TSH order  Wants to include some other part that was noted that he needs other part  Confused about what is ordered? Total TSH?    Insurance may not pay  Please advise

## 2023-01-20 NOTE — TELEPHONE ENCOUNTER
LM for patient that PSA is to be done in a year per the last OV note  Advised he will get a postcard in the mail to make an appointment and he should get the PSA a week before he comes in for his yearly

## 2023-01-20 NOTE — TELEPHONE ENCOUNTER
Patient called today regarding he received a notification by mail regarding a PSA Blood Work Test  Please call Patient back to discuss when this test is due

## 2023-02-03 LAB
T4 FREE SERPL-MCNC: 0.8 NG/DL (ref 0.8–1.8)
TSH SERPL-ACNC: 6.05 MIU/L (ref 0.4–4.5)

## 2023-02-16 ENCOUNTER — CONSULT (OUTPATIENT)
Dept: PULMONOLOGY | Facility: CLINIC | Age: 78
End: 2023-02-16

## 2023-02-16 VITALS
TEMPERATURE: 97.1 F | OXYGEN SATURATION: 97 % | SYSTOLIC BLOOD PRESSURE: 112 MMHG | HEART RATE: 76 BPM | RESPIRATION RATE: 13 BRPM | WEIGHT: 149.5 LBS | DIASTOLIC BLOOD PRESSURE: 62 MMHG | BODY MASS INDEX: 21.4 KG/M2 | HEIGHT: 70 IN

## 2023-02-16 DIAGNOSIS — R91.8 MULTIPLE PULMONARY NODULES: ICD-10-CM

## 2023-02-16 DIAGNOSIS — J84.9 ILD (INTERSTITIAL LUNG DISEASE) (HCC): Primary | ICD-10-CM

## 2023-02-16 PROBLEM — I47.19 PAROXYSMAL ATRIAL TACHYCARDIA: Status: ACTIVE | Noted: 2022-12-01

## 2023-02-16 PROBLEM — I47.1 PAROXYSMAL ATRIAL TACHYCARDIA (HCC): Status: ACTIVE | Noted: 2022-12-01

## 2023-02-16 NOTE — PROGRESS NOTES
Consultation - Pulmonary Medicine   Le Tuttle 76 y o  male MRN: [id number]    Physician Requesting Consult: Dr Yesy Palacios  Reason for Consult: Abnormal Chest CT    ILD (interstitial lung disease) ([address] )  · Has bilateral subpleural, right greater than left changes  · Statistically most likely very early IPF but certainly could be inflammatory  · Asymptomatic at this time  · Obtain high-resolution CT scan and complete pulmonary function test in 6 months    Multiple pulmonary nodules  · Re-identified pulmonary nodules, stable since 2013  · Statistically benign  No need for additional imaging for nodules but will have imaging for possible component of ILD    He will follow-up with me in July after the CAT scan chest abdomen completed  ______________________________________________________________________    HPI:    Le Tuttle is a 76 y o  male who presents for evaluation of an abnormal CAT scan of the chest   He is known to me from remote evaluation but has not been seen in quite some time  He did have pulmonary nodules identified in 2013  Previously stable  CAT scan was done by his primary care physician in early February  Findings were notable for some subpleural changes which could represent early interstitial lung disease  He is referred for evaluation  He reports no symptoms  He has had some throat clearing recently from a URI but this is improving and nearly resolved  No chronic cough  No shortness of breath  He does ballroom dancing regularly and does not notice dyspnea with his activities  No chest pain  No cardiac complaints  He is followed by Dr Tristen Dee     He is not a smoker  He denies any significant exposures    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/   No asbestos or other occupational dusts      Review of Systems:  Aside from what is mentioned in the HPI, the review of systems otherwise negative      Current Medications:    Current Outpatient Medications:   •  aspirin (ECOTRIN LOW STRENGTH) 81 mg EC tablet, Take 81 mg by mouth, Disp: , Rfl:   •  atorvastatin (LIPITOR) 10 mg tablet, Take 10 mg by mouth daily  , Disp: , Rfl:   •  Cholecalciferol (VITAMIN D3) 2000 units capsule, Take 1 capsule by mouth daily, Disp: , Rfl:   •  digoxin (LANOXIN) 0 125 mg tablet, Take 1 tablet (125 mcg total) by mouth daily, Disp: 90 tablet, Rfl: 3  •  guaifenesin-codeine (GUAIFENESIN AC) 100-10 MG/5ML liquid, Take 5 mL by mouth 3 (three) times a day as needed for cough, Disp: 118 mL, Rfl: 0  •  nitroglycerin (NITROSTAT) 0 4 mg SL tablet, PLEASE SEE ATTACHED FOR DETAILED DIRECTIONS, Disp: , Rfl: 2  •  tamsulosin (FLOMAX) 0 4 mg, Take 2 capsules (0 8 mg total) by mouth daily with dinner, Disp: 180 capsule, Rfl: 3    Historical Information   Past Medical History:   Diagnosis Date   • Diverticulitis w/o hemorrhage 2008   • Dyspepsia 2005   • Nephrolithiasis    • PVC (premature ventricular contraction) 03/30/2012     Past Surgical History:   Procedure Laterality Date   • CATARACT EXTRACTION Right 06/2018   • CATARACT EXTRACTION Left 08/30/2018   • COLONOSCOPY W/ POLYPECTOMY  04/04/2006   • PROSTATE BIOPSY  06/03/2016    Prostate Needle Biopsy    • SHOULDER SURGERY Left     Arthrocentesis of the shoulder sub-=acronial bursa area   • UMBILICAL HERNIA REPAIR     • UPPER GASTROINTESTINAL ENDOSCOPY       Social History   Social History     Tobacco Use   Smoking Status Never   Smokeless Tobacco Never   Tobacco Comments    no exposure to passive smoke       Occupational history:  Retired    Family History:   Family History   Problem Relation Age of Onset   • Hypertension Mother    • Stroke Mother    • Diabetes Mother    • Gout Father    • Alzheimer's disease Father    • Diabetes Family         Mellitus         PhysicalExamination:  Vitals:   /62 (BP Location: Right arm, Patient Position: Sitting, Cuff Size: Standard)   Pulse 76   Temp (!) 97 1 °F (36 2 °C) (Tympanic)   Resp 13   Ht 5' 10" (1 778 m)   Wt 67 8 kg (149 lb 8 oz)   SpO2 97%   BMI 21 45 kg/m²   Gen:  Comfortable on room air  No conversational dyspnea  HEENT:  Conjugate gaze  sclerae anicteric  Oropharynx moist  Neck: Trachea is midline  No JVD  No adenopathy  Chest: Mild pectus deformity  Chest excursion symmetric  Lungs clear  Cardiac: Regular  no murmur  Abdomen:  benign  Extremities: No edema  Neuro:  Normal speech and mentation      Diagnostic Data:  Labs: I personally reviewed the most recent laboratory data pertinent to today's visit    Lab Results   Component Value Date    WBC 5 6 11/06/2020    HGB 13 9 11/06/2020    HCT 42 7 11/06/2020    MCV 89 5 11/06/2020     11/06/2020     Lab Results   Component Value Date    CALCIUM 9 1 11/06/2020     06/18/2016    K 4 1 11/06/2020    CO2 30 11/06/2020     11/06/2020    BUN 20 11/06/2020    CREATININE 0 94 11/06/2020     No results found for: IGE  Lab Results   Component Value Date    ALT 22 11/06/2020    AST 20 11/06/2020    ALKPHOS 55 11/06/2020       PFT results:  None available    Imaging:  I personally reviewed the images on the [facility] system pertinent to today's visit  CAT scan was reviewed and compared with the 2013 study  Pulmonary nodules are stable  Largest nodule 7 mm in size    There has been interval development of mild basilar subpleural changes, slightly greater on the right than on the left, which could represent early interstitial lung disease      Víctor Saunders MD

## 2023-02-16 NOTE — ASSESSMENT & PLAN NOTE
· Re-identified pulmonary nodules, stable since 2013  · Statistically benign    No need for additional imaging for nodules but will have imaging for possible component of ILD

## 2023-03-14 ENCOUNTER — HOSPITAL ENCOUNTER (OUTPATIENT)
Dept: RADIOLOGY | Facility: HOSPITAL | Age: 78
Discharge: HOME | End: 2023-03-14
Attending: INTERNAL MEDICINE

## 2023-03-14 DIAGNOSIS — R10.31 RIGHT LOWER QUADRANT ABDOMINAL PAIN: ICD-10-CM

## 2023-03-14 RX ADMIN — IOHEXOL 100 ML: 350 INJECTION, SOLUTION INTRAVENOUS at 08:32

## 2023-05-09 ENCOUNTER — NURSE TRIAGE (OUTPATIENT)
Dept: OTHER | Facility: OTHER | Age: 78
End: 2023-05-09

## 2023-05-09 DIAGNOSIS — N30.00 ACUTE CYSTITIS WITHOUT HEMATURIA: Primary | ICD-10-CM

## 2023-05-09 NOTE — TELEPHONE ENCOUNTER
"Regarding: requesting urine test/ possible infection  ----- Message from Redd Montez sent at 5/9/2023 10:09 AM EDT -----  \" I would like to have a urine test done because I believe I have some sort of infection  \"    "

## 2023-05-09 NOTE — TELEPHONE ENCOUNTER
"  Reason for Disposition  • Urinating more frequently than usual (i e , frequency)    Answer Assessment - Initial Assessment Questions  1  SYMPTOM: \"What's the main symptom you're concerned about? \" (e g , frequency, incontinence)      Frequent urination  2  ONSET: \"When did the frequent urination  start? \"      2 months ago   3  PAIN: \"Is there any pain? \" If Yes, ask: \"How bad is it? \" (Scale: 1-10; mild, moderate, severe)      No   4  CAUSE: \"What do you think is causing the symptoms? \"      Possible UTI   5  OTHER SYMPTOMS: \"Do you have any other symptoms? \" (e g , fever, flank pain, blood in urine, pain with urination)      No    Protocols used: URINARY SYMPTOMS-ADULT-OH    "

## 2023-05-10 ENCOUNTER — APPOINTMENT (OUTPATIENT)
Dept: LAB | Facility: CLINIC | Age: 78
End: 2023-05-10

## 2023-05-10 DIAGNOSIS — N30.00 ACUTE CYSTITIS WITHOUT HEMATURIA: ICD-10-CM

## 2023-05-10 LAB
BACTERIA UR QL AUTO: ABNORMAL /HPF
BILIRUB UR QL STRIP: NEGATIVE
CAOX CRY URNS QL MICRO: ABNORMAL /HPF
CLARITY UR: CLEAR
COLOR UR: YELLOW
GLUCOSE UR STRIP-MCNC: NEGATIVE MG/DL
HGB UR QL STRIP.AUTO: ABNORMAL
HYALINE CASTS #/AREA URNS LPF: ABNORMAL /LPF
KETONES UR STRIP-MCNC: NEGATIVE MG/DL
LEUKOCYTE ESTERASE UR QL STRIP: NEGATIVE
MUCOUS THREADS UR QL AUTO: ABNORMAL
NITRITE UR QL STRIP: NEGATIVE
NON-SQ EPI CELLS URNS QL MICRO: ABNORMAL /HPF
PH UR STRIP.AUTO: 5.5 [PH]
PROT UR STRIP-MCNC: NEGATIVE MG/DL
RBC #/AREA URNS AUTO: ABNORMAL /HPF
SP GR UR STRIP.AUTO: 1.02 (ref 1–1.03)
UROBILINOGEN UR STRIP-ACNC: <2 MG/DL
WBC #/AREA URNS AUTO: ABNORMAL /HPF

## 2023-05-12 LAB — BACTERIA UR CULT: NORMAL

## 2023-05-12 NOTE — TELEPHONE ENCOUNTER
Spoke to pt and advised UC is not finalized  He can call main office number over the weekend for results and treatment if indicated  Pt states the only symptom he is having is increased urinary frequency

## 2023-05-12 NOTE — TELEPHONE ENCOUNTER
Patient would like a call back to discuss his results  He would like a message left if he does not answer  Patient will be home for about 30 minutes yet

## 2023-05-12 NOTE — TELEPHONE ENCOUNTER
Urine Culture Culture too young- will reincubate                  Specimen Collected: 05/10/23  4:09 PM Last Resulted: 05/11/23  3:22 PM         Will monitor

## 2023-05-15 NOTE — TELEPHONE ENCOUNTER
Called and spoke with patient  Advised that urine culture was negative per the provider's note  Informed to make sure he is avoiding bladder irritants and to call back if symptoms worsen  He states he understands

## 2023-05-17 ENCOUNTER — OFFICE VISIT (OUTPATIENT)
Dept: OBGYN CLINIC | Facility: CLINIC | Age: 78
End: 2023-05-17

## 2023-05-17 VITALS
HEIGHT: 69 IN | WEIGHT: 143 LBS | SYSTOLIC BLOOD PRESSURE: 120 MMHG | DIASTOLIC BLOOD PRESSURE: 70 MMHG | BODY MASS INDEX: 21.18 KG/M2

## 2023-05-17 DIAGNOSIS — M17.12 PRIMARY OSTEOARTHRITIS OF LEFT KNEE: Primary | ICD-10-CM

## 2023-05-17 NOTE — ASSESSMENT & PLAN NOTE
Findings today are consistent with left patellofemoral osteoarthritis  Imaging and prognosis was reviewed with the patient today  He was encouraged to continue use of his stationary bike for 2 45 minutes every other day  He may use Aspercreme or Voltaren in conjunction with anti-inflammatories to control his pain  Avoid repetitive squatting, kneeling, and climbing  Follow up as needed  All patient's questions were answered to his satisfaction  This note is created using dictation transcription  It may contain typographical errors, grammatical errors, improperly dictated words, background noise and other errors

## 2023-05-17 NOTE — PROGRESS NOTES
Assessment:     1  Primary osteoarthritis of left knee        Plan:     Problem List Items Addressed This Visit        Musculoskeletal and Integument    Primary osteoarthritis of left knee - Primary     Findings today are consistent with left patellofemoral osteoarthritis  Imaging and prognosis was reviewed with the patient today  He was encouraged to continue use of his stationary bike for 2 45 minutes every other day  He may use Aspercreme or Voltaren in conjunction with anti-inflammatories to control his pain  Avoid repetitive squatting, kneeling, and climbing  Follow up as needed  All patient's questions were answered to his satisfaction  This note is created using dictation transcription  It may contain typographical errors, grammatical errors, improperly dictated words, background noise and other errors  Subjective:     Patient ID: Euel Kussmaul is a 76 y o  male  Chief Complaint:  76 y o  male presents to the office for follow up of left knee pain  He had been doing well with his exercise bike  He started experiencing left anterior knee pain starting roughly 1 day ago  He describes his pain as dull  His pain worsens with ambulation  He has improvement with using his stationary bike  He rates his pain at 5/10  He also notes left anterior hip pain with flexion        Allergy:  Allergies   Allergen Reactions   • Gold-Containing Drug Products Rash     1+ POSITIVE PATCH TEST   • Parabens Rash     POSITIVE 1+ PATCH TEST     Medications:  all current active meds have been reviewed  Past Medical History:  Past Medical History:   Diagnosis Date   • Diverticulitis w/o hemorrhage 2008   • Dyspepsia 2005   • Nephrolithiasis    • Pectus excavatum    • PVC (premature ventricular contraction) 03/30/2012     Past Surgical History:  Past Surgical History:   Procedure Laterality Date   • CATARACT EXTRACTION Right 06/2018   • CATARACT EXTRACTION Left 08/30/2018   • COLONOSCOPY W/ POLYPECTOMY  04/04/2006   • "PROSTATE BIOPSY  06/03/2016    Prostate Needle Biopsy    • SHOULDER SURGERY Left     Arthrocentesis of the shoulder sub-=acronial bursa area   • UMBILICAL HERNIA REPAIR     • UPPER GASTROINTESTINAL ENDOSCOPY       Family History:  Family History   Problem Relation Age of Onset   • Hypertension Mother    • Stroke Mother    • Diabetes Mother    • Gout Father    • Alzheimer's disease Father    • Diabetes Family         Mellitus     Social History:  Social History     Substance and Sexual Activity   Alcohol Use No     Social History     Substance and Sexual Activity   Drug Use No     Social History     Tobacco Use   Smoking Status Never   Smokeless Tobacco Never   Tobacco Comments    no exposure to passive smoke     Review of Systems   Constitutional: Negative for chills and fever  HENT: Negative for drooling, sneezing and voice change  Eyes: Negative for discharge and redness  Respiratory: Negative for cough and wheezing  Cardiovascular: Negative  Gastrointestinal: Negative for vomiting  Genitourinary: Negative  Musculoskeletal: Positive for arthralgias (Left knee)  Negative for gait problem and joint swelling  Skin: Negative for color change  Psychiatric/Behavioral: Negative for behavioral problems  The patient is not nervous/anxious  Objective:  BP Readings from Last 1 Encounters:   05/17/23 120/70      Wt Readings from Last 1 Encounters:   05/17/23 64 9 kg (143 lb)      BMI:   Estimated body mass index is 21 12 kg/m² as calculated from the following:    Height as of this encounter: 5' 9\" (1 753 m)  Weight as of this encounter: 64 9 kg (143 lb)  BSA:   Estimated body surface area is 1 79 meters squared as calculated from the following:    Height as of this encounter: 5' 9\" (1 753 m)  Weight as of this encounter: 64 9 kg (143 lb)  Physical Exam  Vitals and nursing note reviewed  Constitutional:       Appearance: Normal appearance  He is well-developed     HENT:      Head: " Normocephalic and atraumatic  Right Ear: External ear normal       Left Ear: External ear normal    Eyes:      Extraocular Movements: Extraocular movements intact  Conjunctiva/sclera: Conjunctivae normal    Neck:      Trachea: No tracheal deviation  Pulmonary:      Effort: Pulmonary effort is normal    Musculoskeletal:      Cervical back: Neck supple  Left knee: No effusion  Instability Tests: Medial Fartun test negative and lateral Fartun test negative  Skin:     General: Skin is warm and dry  Neurological:      Mental Status: He is alert and oriented to person, place, and time  Psychiatric:         Mood and Affect: Mood normal          Behavior: Behavior normal        Left Knee Exam     Muscle Strength   The patient has normal left knee strength  Tenderness   The patient is experiencing tenderness in the patellar tendon  Range of Motion   The patient has normal left knee ROM    Extension: 0   Flexion: 120     Tests   Fartun:  Medial - negative Lateral - negative  Varus: negative Valgus: negative  Patellar apprehension: negative    Other   Erythema: absent  Scars: absent  Sensation: normal  Pulse: present  Swelling: none  Effusion: no effusion present    Comments:  Patellofemoral joint crepitation with knee range of motion            No new images to review today     Scribe Attestation    I,:  Hiram Rodriguez am acting as a scribe while in the presence of the attending physician :       I,:  Kori Sanon MD personally performed the services described in this documentation    as scribed in my presence :

## 2023-06-13 ENCOUNTER — TELEPHONE (OUTPATIENT)
Dept: FAMILY MEDICINE CLINIC | Facility: CLINIC | Age: 78
End: 2023-06-13

## 2023-07-05 ENCOUNTER — HOSPITAL ENCOUNTER (OUTPATIENT)
Dept: RADIOLOGY | Facility: HOSPITAL | Age: 78
Discharge: HOME | End: 2023-07-05
Attending: INTERNAL MEDICINE
Payer: MEDICARE

## 2023-07-05 ENCOUNTER — HOSPITAL ENCOUNTER (OUTPATIENT)
Dept: PULMONOLOGY | Facility: HOSPITAL | Age: 78
Discharge: HOME | End: 2023-07-05
Attending: INTERNAL MEDICINE
Payer: MEDICARE

## 2023-07-05 DIAGNOSIS — J84.9 ILD (INTERSTITIAL LUNG DISEASE) (HCC): ICD-10-CM

## 2023-07-05 PROCEDURE — 94729 DIFFUSING CAPACITY: CPT | Performed by: INTERNAL MEDICINE

## 2023-07-05 PROCEDURE — 94618 PULMONARY STRESS TESTING: CPT | Performed by: INTERNAL MEDICINE

## 2023-07-05 PROCEDURE — 71250 CT THORAX DX C-: CPT

## 2023-07-05 PROCEDURE — 94726 PLETHYSMOGRAPHY LUNG VOLUMES: CPT | Performed by: INTERNAL MEDICINE

## 2023-07-05 PROCEDURE — 94729 DIFFUSING CAPACITY: CPT

## 2023-07-05 PROCEDURE — 94726 PLETHYSMOGRAPHY LUNG VOLUMES: CPT

## 2023-07-05 PROCEDURE — 94618 PULMONARY STRESS TESTING: CPT

## 2023-07-05 PROCEDURE — 94060 EVALUATION OF WHEEZING: CPT

## 2023-07-05 PROCEDURE — G1004 CDSM NDSC: HCPCS

## 2023-07-05 PROCEDURE — 94060 EVALUATION OF WHEEZING: CPT | Performed by: INTERNAL MEDICINE

## 2023-07-05 RX ORDER — ALBUTEROL SULFATE 2.5 MG/3ML
2.5 SOLUTION RESPIRATORY (INHALATION) ONCE
Status: COMPLETED | OUTPATIENT
Start: 2023-07-05 | End: 2023-07-05

## 2023-07-05 RX ADMIN — ALBUTEROL SULFATE 2.5 MG: 2.5 SOLUTION RESPIRATORY (INHALATION) at 13:29

## 2023-07-19 ENCOUNTER — TELEPHONE (OUTPATIENT)
Dept: UROLOGY | Facility: AMBULATORY SURGERY CENTER | Age: 78
End: 2023-07-19

## 2023-07-19 DIAGNOSIS — N40.1 BPH WITH OBSTRUCTION/LOWER URINARY TRACT SYMPTOMS: ICD-10-CM

## 2023-07-19 DIAGNOSIS — N13.8 BPH WITH OBSTRUCTION/LOWER URINARY TRACT SYMPTOMS: ICD-10-CM

## 2023-07-19 RX ORDER — TAMSULOSIN HYDROCHLORIDE 0.4 MG/1
0.8 CAPSULE ORAL
Qty: 180 CAPSULE | Refills: 3 | Status: SHIPPED | OUTPATIENT
Start: 2023-07-19

## 2023-07-19 NOTE — TELEPHONE ENCOUNTER
Patient is calling to request a prescription refill. Patient is almost out.      Last seen by:  Patience Matias in [facility]     Medication: Tamsulosin 0.4 mg    Pharmacy:  [address] [place] [address]    30 / 90 day supply:  90 days    Pt can be reached at: [phone]

## 2023-09-12 ENCOUNTER — TELEPHONE (OUTPATIENT)
Dept: FAMILY MEDICINE CLINIC | Facility: CLINIC | Age: 78
End: 2023-09-12

## 2023-09-19 ENCOUNTER — OFFICE VISIT (OUTPATIENT)
Dept: PULMONOLOGY | Facility: CLINIC | Age: 78
End: 2023-09-19
Payer: MEDICARE

## 2023-09-19 VITALS
SYSTOLIC BLOOD PRESSURE: 126 MMHG | HEIGHT: 69 IN | OXYGEN SATURATION: 99 % | HEART RATE: 69 BPM | BODY MASS INDEX: 21.48 KG/M2 | TEMPERATURE: 98.2 F | DIASTOLIC BLOOD PRESSURE: 60 MMHG | WEIGHT: 145.01 LBS

## 2023-09-19 DIAGNOSIS — J84.9 ILD (INTERSTITIAL LUNG DISEASE) (HCC): Primary | ICD-10-CM

## 2023-09-19 PROCEDURE — 99213 OFFICE O/P EST LOW 20 MIN: CPT | Performed by: INTERNAL MEDICINE

## 2023-09-19 NOTE — PROGRESS NOTES
Progress note - Pulmonary Medicine   Cristofer Fine 76 y.o. male MRN: [id number]       Impression & Plan:     ILD (interstitial lung disease) ([address])  · July PFTs remain normal  · CT scan shows stable changes in comparison with 6 months ago and essentially minimal change dating back to 2013  · Would recommend continued surveillance. · We will have him follow-up in 1 year in the office and consider office-based spirometry at that time. · Would defer additional imaging at this point as he develops changes in symptoms      Return in about 1 year (around 9/19/2024). ______________________________________________________________________    HPI:    Cristofer Fine presents today for follow-up of interstitial lung disease. He has minimal subpleural changes suggestive of early UIP. Some of the changes do date back to 2013 however making true pulmonary fibrosis much less likely. He does not report any symptom change. Denies any exercise tolerance issues. No chest pain or shortness of breath. Denies wheezing, chronic cough, or other pulmonary symptoms. He did have pulmonary function testing as well as follow-up CT imaging in July. These do show stability with normal pulmonary function testing and unchanged imaging recently. Only very minimal progression in comparison with remote prior from 2013    No GERD. No chronic cough. No chest pain or palpitations. Denies any new cardiovascular complaints.   No weight loss or appetite change    Current Medications:    Current Outpatient Medications:   •  aspirin (ECOTRIN LOW STRENGTH) 81 mg EC tablet, Take 81 mg by mouth, Disp: , Rfl:   •  atorvastatin (LIPITOR) 10 mg tablet, Take 10 mg by mouth daily  , Disp: , Rfl:   •  Cholecalciferol (VITAMIN D3) 2000 units capsule, Take 1 capsule by mouth daily, Disp: , Rfl:   •  digoxin (LANOXIN) 0.125 mg tablet, Take 1 tablet (125 mcg total) by mouth daily, Disp: 90 tablet, Rfl: 3  •  nitroglycerin (NITROSTAT) 0.4 mg SL tablet, , Disp: , Rfl: 2  •  tamsulosin (FLOMAX) 0.4 mg, Take 2 capsules (0.8 mg total) by mouth daily with dinner, Disp: 180 capsule, Rfl: 3    Review of Systems:    Aside from what is mentioned in the HPI, the review of systems is otherwise negative    Past medical history, surgical history, and family history were reviewed and updated as appropriate    Social history updates:  Social History     Tobacco Use   Smoking Status Never   Smokeless Tobacco Never   Tobacco Comments    no exposure to passive smoke       PhysicalExamination:  Vitals:   /60 (BP Location: Right arm, Patient Position: Sitting, Cuff Size: Standard)   Pulse 69   Temp 98.2 °F (36.8 °C) (Tympanic)   Ht 5' 9" (1.753 m)   Wt 65.8 kg (145 lb 0.2 oz)   SpO2 99%   BMI 21.41 kg/m²   Gen:  Comfortable on room air. No conversational dyspnea  HEENT:  Conjugate gaze. sclerae anicteric. Oropharynx moist  Neck: Trachea is midline. No JVD. No adenopathy  Chest: Lungs are clear  Cardiac: Regular. no murmur  Abdomen:  benign  Extremities: No edema  Neuro:  Normal speech and mentation    Diagnostic Data:  Labs: I personally reviewed the most recent laboratory data pertinent to today's visit    Lab Results   Component Value Date    WBC 5.6 11/06/2020    HGB 13.9 11/06/2020    HCT 42.7 11/06/2020    MCV 89.5 11/06/2020     11/06/2020     Lab Results   Component Value Date    SODIUM 143 03/11/2023    K 4.1 03/11/2023    CO2 31 03/11/2023     03/11/2023    BUN 22 03/11/2023    CREATININE 1.00 03/11/2023    CALCIUM 9.1 03/11/2023       PFT results: The most recent pulmonary function tests were reviewed. Normal aside from very minimal reduction in diffusion capacity. Study date is 7/5/2023    Imaging:  I personally reviewed the images on the [facility] system pertinent to today's visit  High-resolution CT scan of the chest from July 2023 was compared with prior from January 2023.   Stable findings of minimal subpleural reticulation and traction bronchiectasis. Multiple pulmonary nodules are stable. Radiologist also indicated stability and nodules dating back to October 2013.       Gildardo Canseco MD

## 2023-09-19 NOTE — ASSESSMENT & PLAN NOTE
· July PFTs remain normal  · CT scan shows stable changes in comparison with 6 months ago and essentially minimal change dating back to 2013  · Would recommend continued surveillance. · We will have him follow-up in 1 year in the office and consider office-based spirometry at that time.     · Would defer additional imaging at this point as he develops changes in symptoms

## 2023-10-09 ENCOUNTER — TELEPHONE (OUTPATIENT)
Age: 78
End: 2023-10-09

## 2023-10-09 NOTE — TELEPHONE ENCOUNTER
Patient called to schedule a NP appt for a rash on his hip that is spreading and getting worse. Next avail appt is 12/20 and was scheduled. I advised he call his pcp in the meantime to have rash evaluated.

## 2023-10-20 ENCOUNTER — APPOINTMENT (OUTPATIENT)
Dept: LAB | Facility: HOSPITAL | Age: 78
End: 2023-10-20
Payer: MEDICARE

## 2023-10-20 ENCOUNTER — NURSE TRIAGE (OUTPATIENT)
Age: 78
End: 2023-10-20

## 2023-10-20 DIAGNOSIS — R39.9 UTI SYMPTOMS: ICD-10-CM

## 2023-10-20 DIAGNOSIS — R39.9 UTI SYMPTOMS: Primary | ICD-10-CM

## 2023-10-20 LAB
BACTERIA UR QL AUTO: NORMAL /HPF
BILIRUB UR QL STRIP: NEGATIVE
CLARITY UR: CLEAR
COLOR UR: NORMAL
GLUCOSE UR STRIP-MCNC: NEGATIVE MG/DL
HGB UR QL STRIP.AUTO: NEGATIVE
KETONES UR STRIP-MCNC: NEGATIVE MG/DL
LEUKOCYTE ESTERASE UR QL STRIP: NEGATIVE
NITRITE UR QL STRIP: NEGATIVE
NON-SQ EPI CELLS URNS QL MICRO: NORMAL /HPF
PH UR STRIP.AUTO: 5.5 [PH]
PROT UR STRIP-MCNC: NEGATIVE MG/DL
RBC #/AREA URNS AUTO: NORMAL /HPF
SP GR UR STRIP.AUTO: 1.01 (ref 1–1.03)
UROBILINOGEN UR STRIP-ACNC: <2 MG/DL
WBC #/AREA URNS AUTO: NORMAL /HPF

## 2023-10-20 PROCEDURE — 81001 URINALYSIS AUTO W/SCOPE: CPT

## 2023-10-20 NOTE — TELEPHONE ENCOUNTER
Thinks has a uti. He has BPH history and is currently on Tamsulosin. No urinary complaints. He has no fever, no abdominal pain, no visualized hematuria, good flow and good urinary stream. Feels empty when he voids. He is not experiencing any N/V fevers, chills, shakes or sweats. Please inform of any recommendations. If urine testing is desired we can send him for testing. Please advise. Answer Questions - Initial Assessment   When did this start: last night    Are you voiding normal amounts or small amounts: no     Have you seen blood in your urine:no    Do you have any other urinary symptoms such as incontinence, nocturia, weak stream or dysuria: yes increased NOCTURIA    Do you have a fever of 101 or higher: no    Have you taken any cold or allergy medication: no    Are you taking a diuretic:no    Have you had a recent urologic surgery or procedure:no    Are you diabetic:no    Have you ever been diagnosed with BPH, benign prostatic hypertrophy?  If yes, are you taking medication for it? (tamsulosin, finasteride or similar):yes and is on FLOMAX    Protocols used: Urinary Frequency

## 2023-10-20 NOTE — TELEPHONE ENCOUNTER
Regarding: UTI Symptoms  ----- Message from Fortino Enriquez sent at 10/20/2023  1:36 PM EDT -----  Pt called to request urine testing. Pt thinks he's starting a UTI because he's having increased frequency. Pt is not having any burning, or fevers. Pt requesting a call back [phone] Pt is hoping to get a call back today.

## 2023-10-20 NOTE — TELEPHONE ENCOUNTER
Called and spoke with patient. Informed on going for urine testing. He states he will go. Postponing for urine testing.

## 2023-11-02 ENCOUNTER — TELEPHONE (OUTPATIENT)
Dept: UROLOGY | Facility: AMBULATORY SURGERY CENTER | Age: 78
End: 2023-11-02

## 2023-11-02 NOTE — TELEPHONE ENCOUNTER
----- Message from Chito Suarez Dr sent at 10/24/2023 12:22 PM EDT -----  If patient remains symptomatic can have a urine culture completed. Otherwise urine testing does not show any signs of infection. Microscopic has no white blood cells or bacteria. I would expect the urine culture to also be negative. Spoke with pt at great length and discussed pt's current symptoms. He states frequency is the biggest complaint he has, he denies pain, dysuria, trouble emptying, or fever/chills. He admits he doesn't drink enough water and sometimes drinks 2-3 cups of coffee in the mornings. We discussed the importance of increasing water intake, as sometimes coffee can be an irritant, which leads to urinary frequency, as well as the dangers of dehydration. He asked what s/s he should look for with dehydration and stated he will try his best to limit coffee and increase water intake. Pt aware Brandondonavon Brock is no longer with practice, and states he wishes to have PSA done and receive results via phone call. I advised that was fine, given his stable history. I advised there is an active order in his chart until 12/28/23 for PSA and he may have done closer to that time and we will call with results. Pt in agreement with plan, all questions answered to his satisfaction.

## 2023-11-09 NOTE — ASSESSMENT & PLAN NOTE
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

## 2023-11-21 LAB — PSA SERPL-MCNC: 0.08 NG/ML

## 2023-11-29 ENCOUNTER — OFFICE VISIT (OUTPATIENT)
Dept: FAMILY MEDICINE CLINIC | Facility: CLINIC | Age: 78
End: 2023-11-29
Payer: MEDICARE

## 2023-11-29 VITALS
SYSTOLIC BLOOD PRESSURE: 140 MMHG | OXYGEN SATURATION: 98 % | WEIGHT: 143.8 LBS | TEMPERATURE: 98.3 F | BODY MASS INDEX: 21.24 KG/M2 | RESPIRATION RATE: 18 BRPM | DIASTOLIC BLOOD PRESSURE: 80 MMHG | HEART RATE: 83 BPM

## 2023-11-29 DIAGNOSIS — R05.1 ACUTE COUGH: Primary | ICD-10-CM

## 2023-11-29 DIAGNOSIS — I47.29 NON-SUSTAINED VENTRICULAR TACHYCARDIA (HCC): ICD-10-CM

## 2023-11-29 DIAGNOSIS — C61 PROSTATE CANCER (HCC): ICD-10-CM

## 2023-11-29 DIAGNOSIS — E03.8 SUBCLINICAL HYPOTHYROIDISM: Chronic | ICD-10-CM

## 2023-11-29 PROCEDURE — 99214 OFFICE O/P EST MOD 30 MIN: CPT | Performed by: FAMILY MEDICINE

## 2023-11-29 RX ORDER — AZITHROMYCIN 250 MG/1
TABLET, FILM COATED ORAL
Qty: 6 TABLET | Refills: 0 | Status: SHIPPED | OUTPATIENT
Start: 2023-11-29 | End: 2023-12-03

## 2023-11-29 NOTE — PROGRESS NOTES
Assessment/Plan:      1. Acute cough  Assessment & Plan:  Subacute  Ongoing for the past 3 weeks  Initially started as a cold, got better and then started getting worse  Patient has rhonchi in the left lower lobe  Concern for bronchitis versus community-acquired pneumonia  Start azithromycin for 5 days    Orders:  -     azithromycin (ZITHROMAX) 250 mg tablet; Take 2 tablets today then 1 tablet daily x 4 days    2. Non-sustained ventricular tachycardia (HCC)  Assessment & Plan:  Stable  No further recurrences  Remains on digoxin 0.125 mcg daily      3. Prostate cancer [facility])  Assessment & Plan:  Continues to follow with urology      4. Subclinical hypothyroidism  -     TSH, 3rd generation with Free T4 reflex; Future            Subjective:      Patient ID: Jarrett Duverney is a 78 y.o. male presents today for sick visit. Symptoms: cough, congestion, wheezing, rhinorrhea, fatigue, sore throat. Symptom onset: 3 weeks ago  Medications tried: chloroseptic  COVID vaccine: none  COVID tests: none  Sick contacts: partner      Cough  Associated symptoms include rhinorrhea and a sore throat. Pertinent negatives include no chest pain, chills, ear pain, fever, headaches, myalgias, postnasal drip, shortness of breath or wheezing. Sore Throat   Associated symptoms include congestion and coughing. Pertinent negatives include no abdominal pain, diarrhea, ear pain, headaches, shortness of breath or vomiting. The following portions of the patient's history were reviewed and updated as appropriate: allergies, current medications, past family history, past medical history, past social history, past surgical history, and problem list.    Review of Systems   Constitutional:  Positive for fatigue. Negative for activity change, chills, diaphoresis and fever. HENT:  Positive for congestion, rhinorrhea and sore throat. Negative for ear pain, hearing loss, postnasal drip, sinus pressure, sinus pain and sneezing.     Respiratory: Positive for cough. Negative for chest tightness, shortness of breath and wheezing. Cardiovascular:  Negative for chest pain, palpitations and leg swelling. Gastrointestinal:  Negative for abdominal pain, blood in stool, constipation, diarrhea, nausea and vomiting. Genitourinary:  Negative for dysuria, frequency, hematuria and urgency. Musculoskeletal:  Negative for arthralgias and myalgias. Neurological:  Negative for dizziness, syncope, weakness, light-headedness, numbness and headaches. Objective:      /80 (BP Location: Left arm, Patient Position: Sitting, Cuff Size: Standard)   Pulse 83   Temp 98.3 °F (36.8 °C) (Temporal)   Resp 18   Wt 65.2 kg (143 lb 12.8 oz)   SpO2 98%   BMI 21.24 kg/m²          Physical Exam  Vitals reviewed. Constitutional:       General: He is not in acute distress. Appearance: He is well-developed. He is not diaphoretic. HENT:      Head: Normocephalic and atraumatic. Right Ear: External ear normal.      Left Ear: External ear normal.      Nose: Nose normal. No congestion. Mouth/Throat:      Mouth: Mucous membranes are moist.      Pharynx: Oropharynx is clear. No oropharyngeal exudate. Eyes:      General: No scleral icterus. Pupils: Pupils are equal, round, and reactive to light. Neck:      Thyroid: No thyromegaly. Vascular: No JVD. Trachea: No tracheal deviation. Cardiovascular:      Rate and Rhythm: Normal rate and regular rhythm. Pulses: Normal pulses. Heart sounds: Normal heart sounds. No murmur heard. No friction rub. Pulmonary:      Effort: Pulmonary effort is normal. No respiratory distress. Breath sounds: Examination of the left-middle field reveals rhonchi. Examination of the left-lower field reveals rhonchi. Rhonchi present. No decreased breath sounds, wheezing or rales. Chest:      Chest wall: No tenderness. Abdominal:      General: Bowel sounds are normal. There is no distension. Palpations: Abdomen is soft. Tenderness: There is no abdominal tenderness. There is no right CVA tenderness, left CVA tenderness, guarding or rebound. Musculoskeletal:         General: No tenderness. Normal range of motion. Cervical back: Normal range of motion and neck supple. No tenderness. Right lower leg: No edema. Left lower leg: No edema. Lymphadenopathy:      Cervical: No cervical adenopathy. Skin:     General: Skin is warm and dry. Neurological:      Mental Status: He is alert and oriented to person, place, and time. Mental status is at baseline. Deep Tendon Reflexes: Reflexes are normal and symmetric. Psychiatric:         Mood and Affect: Mood normal.         Behavior: Behavior normal.         Thought Content:  Thought content normal.         Judgment: Judgment normal.

## 2023-11-29 NOTE — PATIENT INSTRUCTIONS
Do not take your digoxin tomorrow or Saturday while on the azithromycin. Starting Sunday, go back to your regular regimen.

## 2023-11-29 NOTE — ASSESSMENT & PLAN NOTE
Subacute  Ongoing for the past 3 weeks  Initially started as a cold, got better and then started getting worse  Patient has rhonchi in the left lower lobe  Concern for bronchitis versus community-acquired pneumonia  Start azithromycin for 5 days

## 2023-12-05 LAB
T4 FREE SERPL-MCNC: 0.8 NG/DL (ref 0.8–1.8)
TSH SERPL-ACNC: 6.72 MIU/L (ref 0.4–4.5)

## 2023-12-13 ENCOUNTER — TELEPHONE (OUTPATIENT)
Age: 78
End: 2023-12-13

## 2023-12-13 DIAGNOSIS — N13.8 BPH WITH OBSTRUCTION/LOWER URINARY TRACT SYMPTOMS: ICD-10-CM

## 2023-12-13 DIAGNOSIS — N40.1 BPH WITH OBSTRUCTION/LOWER URINARY TRACT SYMPTOMS: ICD-10-CM

## 2023-12-13 RX ORDER — TAMSULOSIN HYDROCHLORIDE 0.4 MG/1
0.8 CAPSULE ORAL
Qty: 180 CAPSULE | Refills: 3 | Status: SHIPPED | OUTPATIENT
Start: 2023-12-13

## 2023-12-13 NOTE — TELEPHONE ENCOUNTER
S/W Michelle Shi, the patient, he updated his pharmacy, he will now be using [facility]. But they will not transfer his meds from the other at home mail company.  The patient needs a new script for the Flomax sent to [facility]  [phone]

## 2023-12-20 ENCOUNTER — OFFICE VISIT (OUTPATIENT)
Dept: DERMATOLOGY | Facility: CLINIC | Age: 78
End: 2023-12-20

## 2023-12-20 ENCOUNTER — TELEPHONE (OUTPATIENT)
Age: 78
End: 2023-12-20

## 2023-12-20 VITALS — BODY MASS INDEX: 21.15 KG/M2 | HEIGHT: 69 IN | WEIGHT: 142.8 LBS

## 2023-12-20 DIAGNOSIS — L82.1 SEBORRHEIC KERATOSIS: Primary | ICD-10-CM

## 2023-12-20 DIAGNOSIS — R21 RASH: ICD-10-CM

## 2023-12-20 DIAGNOSIS — L81.7 SCHAMBERG'S PURPURA: ICD-10-CM

## 2023-12-20 DIAGNOSIS — D22.72 MULTIPLE BENIGN MELANOCYTIC NEVI OF BOTH UPPER EXTREMITIES, BOTH LOWER EXTREMITIES, AND TRUNK: ICD-10-CM

## 2023-12-20 DIAGNOSIS — L81.4 LENTIGO: ICD-10-CM

## 2023-12-20 DIAGNOSIS — D22.61 MULTIPLE BENIGN MELANOCYTIC NEVI OF BOTH UPPER EXTREMITIES, BOTH LOWER EXTREMITIES, AND TRUNK: ICD-10-CM

## 2023-12-20 DIAGNOSIS — D22.62 MULTIPLE BENIGN MELANOCYTIC NEVI OF BOTH UPPER EXTREMITIES, BOTH LOWER EXTREMITIES, AND TRUNK: ICD-10-CM

## 2023-12-20 DIAGNOSIS — D22.71 MULTIPLE BENIGN MELANOCYTIC NEVI OF BOTH UPPER EXTREMITIES, BOTH LOWER EXTREMITIES, AND TRUNK: ICD-10-CM

## 2023-12-20 DIAGNOSIS — D18.01 CHERRY ANGIOMA: ICD-10-CM

## 2023-12-20 DIAGNOSIS — D22.5 MULTIPLE BENIGN MELANOCYTIC NEVI OF BOTH UPPER EXTREMITIES, BOTH LOWER EXTREMITIES, AND TRUNK: ICD-10-CM

## 2023-12-20 PROCEDURE — 88342 IMHCHEM/IMCYTCHM 1ST ANTB: CPT | Performed by: STUDENT IN AN ORGANIZED HEALTH CARE EDUCATION/TRAINING PROGRAM

## 2023-12-20 PROCEDURE — 88312 SPECIAL STAINS GROUP 1: CPT | Performed by: STUDENT IN AN ORGANIZED HEALTH CARE EDUCATION/TRAINING PROGRAM

## 2023-12-20 PROCEDURE — 88307 TISSUE EXAM BY PATHOLOGIST: CPT | Performed by: STUDENT IN AN ORGANIZED HEALTH CARE EDUCATION/TRAINING PROGRAM

## 2023-12-20 PROCEDURE — 88341 IMHCHEM/IMCYTCHM EA ADD ANTB: CPT | Performed by: STUDENT IN AN ORGANIZED HEALTH CARE EDUCATION/TRAINING PROGRAM

## 2023-12-20 RX ORDER — DESONIDE 0.5 MG/G
OINTMENT TOPICAL 2 TIMES DAILY
Qty: 60 G | Refills: 1 | Status: SHIPPED | OUTPATIENT
Start: 2023-12-20 | End: 2023-12-20

## 2023-12-20 RX ORDER — TRIAMCINOLONE ACETONIDE 1 MG/G
OINTMENT TOPICAL 2 TIMES DAILY
Qty: 453.6 G | Refills: 1 | Status: CANCELLED | OUTPATIENT
Start: 2023-12-20

## 2023-12-20 RX ORDER — FLUOCINOLONE ACETONIDE 0.25 MG/G
OINTMENT TOPICAL 2 TIMES DAILY
Qty: 60 G | Refills: 0 | Status: SHIPPED | OUTPATIENT
Start: 2023-12-20

## 2023-12-20 RX ORDER — DESONIDE 0.5 MG/G
OINTMENT TOPICAL 2 TIMES DAILY
Qty: 60 G | Refills: 1 | Status: SHIPPED | OUTPATIENT
Start: 2023-12-20 | End: 2023-12-20 | Stop reason: SDUPTHER

## 2023-12-20 RX ORDER — DESONIDE 0.5 MG/G
OINTMENT TOPICAL 2 TIMES DAILY
Qty: 60 G | Refills: 0 | Status: SHIPPED | OUTPATIENT
Start: 2023-12-20

## 2023-12-20 NOTE — TELEPHONE ENCOUNTER
Pt calling requesting to speak to [place]    He has questions about medications prescribed or discussed at this appt with Dr Cheung today    Please call pt back at [phone]

## 2023-12-20 NOTE — PROGRESS NOTES
"[facility] Note     Patient Name: Gary J Kocher  Encounter Date: 12/20/2023     Have you been cared for by a [facility] Dermatologist in the last 3 years and, if so, which description applies to you?    NO.   I am considered a \"new\" patient and must complete all patient intake questions. I am MALE/not capable of bearing children.    REVIEW OF SYSTEMS:  Have you recently had or currently have any of the following? Recent fever or chills? No  Any non-healing wound? No   PAST MEDICAL HISTORY:  Have you personally ever had or currently have any of the following?  If \"YES,\" then please provide more detail. Skin cancer (such as Melanoma, Basal Cell Carcinoma, Squamous Cell Carcinoma?  No  Tuberculosis, HIV/AIDS, Hepatitis B or C: No  Radiation Treatment YES, treatment of prostate cancer   HISTORY OF IMMUNOSUPPRESSION:   Do you have a history of any of the following:  Systemic Immunosuppression such as Diabetes, Biologic or Immunotherapy, Chemotherapy, Organ Transplantation, Bone Marrow Transplantation?  No     Answering \"YES\" requires the addition of the dotphrase \"IMMUNOSUPPRESSED\" as the first diagnosis of the patient's visit.   FAMILY HISTORY:  Any \"first degree relatives\" (parent, brother, sister, or child) with the following?    Skin Cancer, Pancreatic or Other Cancer? No   PATIENT EXPERIENCE:    Do you want the Dermatologist to perform a COMPLETE skin exam today including a clinical examination under the \"bra and underwear\" areas?  Yes  If necessary, do we have your permission to call and leave a detailed message on your Preferred Phone number that includes your specific medical information?  Yes      Allergies   Allergen Reactions   • Gold-Containing Drug Products Rash     1+ POSITIVE PATCH TEST   • Parabens Rash     POSITIVE 1+ PATCH TEST      Current Outpatient Medications:   •  aspirin (ECOTRIN LOW STRENGTH) 81 mg EC tablet, Take 81 mg by mouth, Disp: , Rfl:   •  atorvastatin (LIPITOR) 10 mg " tablet, Take 10 mg by mouth daily  , Disp: , Rfl:   •  Cholecalciferol (VITAMIN D3) 2000 units capsule, Take 1 capsule by mouth daily, Disp: , Rfl:   •  digoxin (LANOXIN) 0.125 mg tablet, Take 1 tablet (125 mcg total) by mouth daily, Disp: 90 tablet, Rfl: 3  •  fluocinolone (SYNALAR) 0.025 % ointment, Apply topically 2 (two) times a day, Disp: 60 g, Rfl: 0  •  nitroglycerin (NITROSTAT) 0.4 mg SL tablet, , Disp: , Rfl: 2  •  tamsulosin (FLOMAX) 0.4 mg, Take 2 capsules (0.8 mg total) by mouth daily with dinner, Disp: 180 capsule, Rfl: 3  •  desonide (DESOWEN) 0.05 % ointment, Apply topically 2 (two) times a day To affected area, Disp: 60 g, Rfl: 0          Whom besides the patient is providing clinical information about today's encounter?   NO ADDITIONAL HISTORIAN (patient alone provided history)    Physical Exam and Assessment/Plan by Diagnosis:    SEBORRHEIC KERATOSES  - Relevant exam: Scattered over the trunk/extremities are waxy brown to black plaques and papules with stuck on appearance and consistent dermoscopy  - Exam and clinical history consistent with seborrheic keratoses  - Counseled that these are benign growths that do not require treatment  - Counseled that removal of lesions is considered cosmetic and so would incur a fee should patient elect to move forward.       MELANOCYTIC NEVI  -Relevant exam: Scattered over the trunk/extremities are homogenously pigmented brown macules and papules. ELM performed and without concerning findings. No outliers unless otherwise noted in today's note  - Exam and clinical history consistent with melanocytic nevi  - Educated on the ABCDE's of melanoma; handout provided  - Counseled to return to clinic prior to scheduled appointment should any of these lesions change or should any new lesions of concern arise  - Counseled on use of sun protection daily. Reviewed latest FDA sunscreen guidelines, including use of broad spectrum (UVA and UVB blocking) sunscreen or sun  protective clothing with SPF 30-50 every 2-3 hours and reapplied after exposure to water; use of photoprotective clothing, including a broad brim hat and UPF rated clothing if outdoors for several hours; avoid use of tanning beds as these pose significant risk for melanoma and skin cancer.    LENTIGINES  OTHER SKIN CHANGES DUE TO CHRONIC EXPOSURE TO NONIONIZING RADIATION  - Relevant exam: Over sun exposed areas are brown macules. ELM performed and without concerning findings.  - Exam and clinical history consistent with lentigines.  - Educated that these are indicative of prior sun exposure.   - Counseled to return to clinic prior to scheduled appointment should any of these lesions change or should any new lesions of concern arise.  - Recommended use of sunscreen as above and below.  - Counseled on use of sun protection daily. Reviewed latest FDA sunscreen guidelines, including use of broad spectrum (UVA and UVB blocking) sunscreen or sun protective clothing with SPF 30-50 every 2-3 hours and reapplied after exposure to water; use of photoprotective clothing, including a broad brim hat and UPF rated clothing if outdoors for several hours; avoid use of tanning beds as these pose significant risk for melanoma and skin cancer.    CHERRY ANGIOMAS  - Relevant exam: Scattered over the trunk/extremities and on tip of nose are red papules  - Exam and clinical history consistent with cherry angiomas  - Educated that these are benign  - Educated that removal is considered aesthetic and would incur a fee.    RASH    Physical Exam:  (Anatomic Location); (Size and Morphological Description); (Differential Diagnosis):  Left hip; resolving papular crusted eruption; ddx: non-specific eczema vs lymphomatoid papulosis vs bites  Pertinent Positives:  Pertinent Negatives:    Additional History of Present Condition:  He had patch testing done in 2020 which showed reactions to paraben mix and gold. Reports current rash is present since  August. Denies itching or pain. Has not tried any treatments yet. Reports it has started to fade.    Assessment and Plan:  Based on a thorough discussion of this condition and the management approach to it (including a comprehensive discussion of the known risks, side effects and potential benefits of treatment), the patient (family) agrees to implement the following specific plan:  Punch Biopsy with signed consent.  Apply Desonide 0.05% ointment to affected area of hip twice daily for one month. We are not using Triamcinolone due to paraben preservatives in ingredient list. Patient had positive patch test response to this ingredient.    PROCEDURE NOTE:  PUNCH BIOPSY      Performing Physician:     Anatomic Location; Clinical Description with size (cm); Pre-Op Diagnosis:    Left hip; resolving papular crusted eruption; ddx: non-specific eczema vs lymphomatoid  vs bites     Anesthesia: 1% xylocaine with epi       Topical anesthesia: None       Indications: To indicate diagnosis and management plan.    Procedure Details     Patient informed of the risks (including bleeding,scaring and infection) and benefits of the procedure explained. Verbal and written informed consent obtained. The area was prepped and draped in the usual fashion. Anesthesia was obtained with 1% lidocaine with epinephrine. The skin was then stretched perpendicular to the skin tension lines and a punch biopsy to an appropriate sampling depth was obtained with a 4 mm punch with a forceps and iris scissors.     Hemostasis was obtained with 5-0 Ethilon x 1 sutures.     Complications:  None      Specimen has been sent for review by Dermatopathology.      Plan:  1. Instructed to keep the wound dry and covered for 24-48h and clean thereafter.  2. Warning signs of infection were reviewed.    3. Recommended that the patient use acetaminophen as needed for pain  4. Sutures if any should be removed in 10-14 days      Standard post-procedure care has  been explained and has been included in written form within the patient's copy of Informed Consent.         CHRONIC PIGMENTED PURPURA (SCHAMBERG'S PURPURA)  Physical Exam:  Anatomic Location Affected:  bilateral anterior shins  Morphological Description:  brown pigmentation  Pertinent Positives:  Pertinent Negatives:    Additional History of Present Condition:  Reports present for years    Assessment and Plan:  Based on a thorough discussion of this condition and the management approach to it (including a comprehensive discussion of the known risks, side effects and potential benefits of treatment), the patient (family) agrees to implement the following specific plan:  Reassured benign. Will continue to monitor  This can continue to spread on the legs      Scribe Attestation    I,:  Paradise Stiles am acting as a scribe while in the presence of the attending physician.:       I,:  Manav Cheung MD personally performed the services described in this documentation    as scribed in my presence.:

## 2023-12-20 NOTE — TELEPHONE ENCOUNTER
Patient has at home white petroleum gel and Eucerin and wanted to know if he could use this in combination with topical prescription he was prescribed. I offered to help but patient would like to speak with Reema. Please contact [phone].     He wouldn't reify to me which prescription he needed instructions for.

## 2023-12-20 NOTE — PATIENT INSTRUCTIONS
SEBORRHEIC KERATOSES  - Relevant exam: Scattered over the trunk/extremities are waxy brown to black plaques and papules with stuck on appearance and consistent dermoscopy  - Exam and clinical history consistent with seborrheic keratoses  - Counseled that these are benign growths that do not require treatment  - Counseled that removal of lesions is considered cosmetic and so would incur a fee should patient elect to move forward.     MELANOCYTIC NEVI  -Relevant exam: Scattered over the trunk/extremities are homogenously pigmented brown macules and papules. ELM performed and without concerning findings. No outliers unless otherwise noted in today's note  - Exam and clinical history consistent with melanocytic nevi  - Educated on the ABCDE's of melanoma; handout provided  - Counseled to return to clinic prior to scheduled appointment should any of these lesions change or should any new lesions of concern arise  - Counseled on use of sun protection daily. Reviewed latest FDA sunscreen guidelines, including use of broad spectrum (UVA and UVB blocking) sunscreen or sun protective clothing with SPF 30-50 every 2-3 hours and reapplied after exposure to water; use of photoprotective clothing, including a broad brim hat and UPF rated clothing if outdoors for several hours; avoid use of tanning beds as these pose significant risk for melanoma and skin cancer.    LENTIGINES  OTHER SKIN CHANGES DUE TO CHRONIC EXPOSURE TO NONIONIZING RADIATION  - Relevant exam: Over sun exposed areas are brown macules. ELM performed and without concerning findings.  - Exam and clinical history consistent with lentigines.  - Educated that these are indicative of prior sun exposure.   - Counseled to return to clinic prior to scheduled appointment should any of these lesions change or should any new lesions of concern arise.  - Recommended use of sunscreen as above and below.  - Counseled on use of sun protection daily. Reviewed latest FDA sunscreen  guidelines, including use of broad spectrum (UVA and UVB blocking) sunscreen or sun protective clothing with SPF 30-50 every 2-3 hours and reapplied after exposure to water; use of photoprotective clothing, including a broad brim hat and UPF rated clothing if outdoors for several hours; avoid use of tanning beds as these pose significant risk for melanoma and skin cancer.    CHERRY ANGIOMAS  - Relevant exam: Scattered over the trunk/extremities and on tip of nose are red papules  - Exam and clinical history consistent with cherry angiomas  - Educated that these are benign  - Educated that removal is considered aesthetic and would incur a fee.    RASH    Assessment and Plan:  Based on a thorough discussion of this condition and the management approach to it (including a comprehensive discussion of the known risks, side effects and potential benefits of treatment), the patient (family) agrees to implement the following specific plan:  Punch Biopsy with signed consent.  Apply Desonide 0.05% ointment to affected area of hip twice daily for one month. We are not using Triamcinolone due to paraben preservatives in ingredient list. Patient had positive patch test response to this ingredient.    PROCEDURE NOTE:  PUNCH BIOPSY      Performing Physician:     Anatomic Location; Clinical Description with size (cm); Pre-Op Diagnosis:    Left hip; resolving papular crusted eruption; ddx: non-specific eczema vs lymphomatoid  vs bites     Anesthesia: 1% xylocaine with epi       Topical anesthesia: None       Indications: To indicate diagnosis and management plan.    Procedure Details     Patient informed of the risks (including bleeding,scaring and infection) and benefits of the procedure explained. Verbal and written informed consent obtained. The area was prepped and draped in the usual fashion. Anesthesia was obtained with 1% lidocaine with epinephrine. The skin was then stretched perpendicular to the skin tension lines  and a punch biopsy to an appropriate sampling depth was obtained with a 4 mm punch with a forceps and iris scissors.     Hemostasis was obtained with 5-0 Ethilon x 1 sutures.     Complications:  None      Specimen has been sent for review by Dermatopathology.      Plan:  1. Instructed to keep the wound dry and covered for 24-48h and clean thereafter.  2. Warning signs of infection were reviewed.    3. Recommended that the patient use acetaminophen as needed for pain  4. Sutures if any should be removed in 10-14 days      Standard post-procedure care has been explained and has been included in written form within the patient's copy of Informed Consent.         CHRONIC PIGMENTED PURPURA (SCHAMBERG'S PURPURA)    Assessment and Plan:  Based on a thorough discussion of this condition and the management approach to it (including a comprehensive discussion of the known risks, side effects and potential benefits of treatment), the patient (family) agrees to implement the following specific plan:  Reassured benign. Will continue to monitor  This can continue to spread on the legs

## 2023-12-21 NOTE — ADDENDUM NOTE
Addended by: LESTER CASTELLANO on: 12/20/2023 01:01 PM     Modules accepted: Orders     denies pain/discomfort (Rating = 0)

## 2023-12-21 NOTE — TELEPHONE ENCOUNTER
Patient called again , does not want to give me more information besides 3 identifiers I offered to help but he wants to speak with Paradise

## 2024-01-02 PROCEDURE — 88342 IMHCHEM/IMCYTCHM 1ST ANTB: CPT | Performed by: STUDENT IN AN ORGANIZED HEALTH CARE EDUCATION/TRAINING PROGRAM

## 2024-01-02 PROCEDURE — 88341 IMHCHEM/IMCYTCHM EA ADD ANTB: CPT | Performed by: STUDENT IN AN ORGANIZED HEALTH CARE EDUCATION/TRAINING PROGRAM

## 2024-01-02 PROCEDURE — 88307 TISSUE EXAM BY PATHOLOGIST: CPT | Performed by: STUDENT IN AN ORGANIZED HEALTH CARE EDUCATION/TRAINING PROGRAM

## 2024-01-02 PROCEDURE — 88312 SPECIAL STAINS GROUP 1: CPT | Performed by: STUDENT IN AN ORGANIZED HEALTH CARE EDUCATION/TRAINING PROGRAM

## 2024-01-04 ENCOUNTER — TELEPHONE (OUTPATIENT)
Age: 79
End: 2024-01-04

## 2024-01-04 ENCOUNTER — OFFICE VISIT (OUTPATIENT)
Dept: DERMATOLOGY | Facility: CLINIC | Age: 79
End: 2024-01-04

## 2024-01-04 DIAGNOSIS — L98.9 DERMATOSIS: Primary | ICD-10-CM

## 2024-01-04 DIAGNOSIS — R21 RASH: Primary | ICD-10-CM

## 2024-01-04 PROCEDURE — RECHECK: Performed by: DERMATOLOGY

## 2024-01-04 RX ORDER — TRIAMCINOLONE ACETONIDE 1 MG/G
OINTMENT TOPICAL 2 TIMES DAILY
Qty: 30 G | Refills: 1 | Status: SHIPPED | OUTPATIENT
Start: 2024-01-04

## 2024-01-04 RX ORDER — DESOXIMETASONE 2.5 MG/G
OINTMENT TOPICAL 2 TIMES DAILY
Qty: 60 G | Refills: 1 | Status: SHIPPED | OUTPATIENT
Start: 2024-01-04

## 2024-01-04 NOTE — TELEPHONE ENCOUNTER
Patient called requesting a call from Dr. Cheung , he said is something complicated to tell .   Has a suture removal today and Dr. Cheung told him to put triamcinolone 0.1% ointment  on it but ; he found that is allergic to parabens. He would like to know if he can be prescribed something else parabens free covered by insurance .

## 2024-01-04 NOTE — PROGRESS NOTES
Suture removal    Date/Time: 1/4/2024 9:00 AM    Performed by: Rosalee June  Authorized by: Manav Cheung MD  Universal Protocol:  Consent: Verbal consent obtained.  Consent given by: patient  Timeout called at: 1/4/2024 9:02 AM.  Patient understanding: patient states understanding of the procedure being performed  Patient consent: the patient's understanding of the procedure matches consent given  Procedure consent: procedure consent matches procedure scheduled  Patient identity confirmed: verbally with patient      Patient location:  Clinic  Location:     Laterality:  Left    Location:  Lower extremity    Lower extremity location:  Hip    Hip location:  L hip  Procedure details:     Tools used:  Suture removal kit    Wound appearance:  No sign(s) of infection, nonpurulent, good wound healing, nontender and clean    Sutures removed: 1.  Post-procedure details:     Post-removal:  No dressing applied    Patient tolerance of procedure:  Tolerated well, no immediate complications

## 2024-01-05 ENCOUNTER — OFFICE VISIT (OUTPATIENT)
Dept: URGENT CARE | Facility: MEDICAL CENTER | Age: 79
End: 2024-01-05
Payer: MEDICARE

## 2024-01-05 VITALS
SYSTOLIC BLOOD PRESSURE: 122 MMHG | HEART RATE: 82 BPM | HEIGHT: 69 IN | BODY MASS INDEX: 21.09 KG/M2 | TEMPERATURE: 98.2 F | RESPIRATION RATE: 16 BRPM | OXYGEN SATURATION: 98 % | DIASTOLIC BLOOD PRESSURE: 68 MMHG

## 2024-01-05 DIAGNOSIS — R05.1 ACUTE COUGH: ICD-10-CM

## 2024-01-05 DIAGNOSIS — J06.9 ACUTE URI: Primary | ICD-10-CM

## 2024-01-05 PROCEDURE — G0463 HOSPITAL OUTPT CLINIC VISIT: HCPCS

## 2024-01-05 PROCEDURE — 99213 OFFICE O/P EST LOW 20 MIN: CPT

## 2024-01-05 PROCEDURE — 87636 SARSCOV2 & INF A&B AMP PRB: CPT

## 2024-01-05 NOTE — PROGRESS NOTES
[facility]        NAME: Gary J Kocher is a 78 y.o. male  : 1945    MRN: [id number]  DATE: 2024  TIME: 6:24 PM    Assessment and Plan   Acute URI [J06.9]  1. Acute URI        2. Acute cough  Covid/Flu- Office Collect Normal        COVID/Flu PCR sent out.    Presentation consistent with URI. Advised symptomatic treatment. Patient out of window for both Tamiflu and Paxlovid.    Patient Instructions     Your symptoms are likely due to a viral illness. COVID/Flu testing was sent out. Results will be available on [facility] in about 24-48 hours. The mainstay of treatment is for symptom relief, see below for recommended medications.  Fever/Body Aches: We recommend you take 600mg ibuprofen every 6 hours or tylenol 650mg every 6 hours as needed for fever. If needed, you can alternate these medications so that you take one medication every 3 hours. For instance, at noon take ibuprofen, then at 3pm take tylenol, then at 6pm take ibuprofen.   Cough: Delsym, an over the counter cough medication may be used every 12 hours as needed.  Mucinex XR (guafenisen) 600 mg tablets may be used to thin out the mucous to make it easier to cough up.  You may take 1-2 tablets twice per day as needed. Utilizing a vaporizer/humidifier may help, as well as increasing fluids.  Sore Throat: Salt water gargles with 1 teaspoon of salt dissolved in 6-8 oz of water as needed can help with a sore throat, as can honey, drinking plenty of liquids, eating soft foods. If severe, can utilize OTC chloraseptic spray.  Nasal Congestion: Over the counter allergy medication like Claritin, Allegra or Zyrtec can help with nasal congestion and post nasal drip.  Over the counter saline or steroid nasal sprays like Flonase can help with nasal congestion and post nasal drip as well. Over the counter decongestants such as Sudafed may also help.  Upset Stomach: Drink plenty of fluids. May utilize Gatorade, Pedialyte, or other electrolyte  solutions to supplement. Eat bland foods (ex: BRAT - bananas, rice, applesauce, toast) and slowly advance to other foods as tolerated.  Please note, if you have heart issues or high blood pressure, the cough/cold medication of choice is Coricidin. Talk to your doctor before trying any new medications.    Follow up with PCP in 3-5 days.  Proceed to  ER if symptoms worsen.    Chief Complaint     Chief Complaint   Patient presents with    Chills    Nasal Congestion     Pt states his symptoms started about a week ago. Pt reports sneezing, congestion, chills, and light pink spots on his abdomen            History of Present Illness       URI   This is a new problem. Episode onset: about one week ago. The problem has been gradually worsening. There has been no fever. Associated symptoms include abdominal pain (light pain across the top of his stomach), congestion, coughing, a rash (sees dermatology; denies new rash), rhinorrhea, sinus pain and a sore throat (slight). Pertinent negatives include no diarrhea, ear pain, nausea, vomiting or wheezing. Treatments tried: chloraseptic spray. The treatment provided mild relief.     Denies sick contacts.    Review of Systems   Review of Systems   Constitutional:  Positive for chills. Negative for appetite change and fever.   HENT:  Positive for congestion, postnasal drip, rhinorrhea, sinus pressure, sinus pain and sore throat (slight). Negative for ear discharge and ear pain.    Eyes:  Negative for pain, discharge, redness and itching.   Respiratory:  Positive for cough. Negative for chest tightness, shortness of breath and wheezing.    Gastrointestinal:  Positive for abdominal pain (light pain across the top of his stomach). Negative for diarrhea, nausea and vomiting.   Skin:  Positive for rash (sees dermatology; denies new rash).         Current Medications       Current Outpatient Medications:     aspirin (ECOTRIN LOW STRENGTH) 81 mg EC tablet, Take 81 mg by mouth, Disp: , Rfl:      atorvastatin (LIPITOR) 10 mg tablet, Take 10 mg by mouth daily  , Disp: , Rfl:     Cholecalciferol (VITAMIN D3) 2000 units capsule, Take 1 capsule by mouth daily, Disp: , Rfl:     desoximetasone (TOPICORT) 0.25 % ointment, Apply topically 2 (two) times a day, Disp: 60 g, Rfl: 1    digoxin (LANOXIN) 0.125 mg tablet, Take 1 tablet (125 mcg total) by mouth daily, Disp: 90 tablet, Rfl: 3    nitroglycerin (NITROSTAT) 0.4 mg SL tablet, , Disp: , Rfl: 2    tamsulosin (FLOMAX) 0.4 mg, Take 2 capsules (0.8 mg total) by mouth daily with dinner, Disp: 180 capsule, Rfl: 3    triamcinolone (KENALOG) 0.1 % ointment, Apply topically 2 (two) times a day To the spots only for 2 weeks straight., Disp: 30 g, Rfl: 1    Current Allergies     Allergies as of 01/05/2024 - Reviewed 01/05/2024   Allergen Reaction Noted    Gold-containing drug products Rash 01/20/2020    Parabens Rash 01/20/2020            The following portions of the patient's history were reviewed and updated as appropriate: allergies, current medications, past family history, past medical history, past social history, past surgical history and problem list.     Past Medical History:   Diagnosis Date    Diverticulitis w/o hemorrhage 2008    Dyspepsia 2005    Multiple pulmonary nodules 9/24/2013    Nephrolithiasis     Pectus excavatum     PVC (premature ventricular contraction) 03/30/2012       Past Surgical History:   Procedure Laterality Date    CATARACT EXTRACTION Right 06/2018    CATARACT EXTRACTION Left 08/30/2018    COLONOSCOPY W/ POLYPECTOMY  04/04/2006    PROSTATE BIOPSY  06/03/2016    Prostate Needle Biopsy     SHOULDER SURGERY Left     Arthrocentesis of the shoulder sub-=acronial bursa area    UMBILICAL HERNIA REPAIR      UPPER GASTROINTESTINAL ENDOSCOPY         Family History   Problem Relation Age of Onset    Hypertension Mother     Stroke Mother     Diabetes Mother     Gout Father     Alzheimer's disease Father     Diabetes Family         Mellitus  "        Medications have been verified.        Objective   /68   Pulse 82   Temp 98.2 °F (36.8 °C)   Resp 16   Ht 5' 9\" (1.753 m)   SpO2 98%   BMI 21.09 kg/m²        Physical Exam     Physical Exam  Vitals and nursing note reviewed.   Constitutional:       General: He is not in acute distress.     Appearance: Normal appearance. He is not ill-appearing.   HENT:      Right Ear: Tympanic membrane, ear canal and external ear normal.      Left Ear: Tympanic membrane, ear canal and external ear normal.      Nose: Congestion and rhinorrhea present.      Mouth/Throat:      Mouth: Mucous membranes are moist.      Pharynx: No oropharyngeal exudate or posterior oropharyngeal erythema.   Eyes:      General:         Right eye: No discharge.         Left eye: No discharge.      Extraocular Movements: Extraocular movements intact.   Cardiovascular:      Rate and Rhythm: Normal rate and regular rhythm.      Pulses: Normal pulses.      Heart sounds: Normal heart sounds.   Pulmonary:      Effort: Pulmonary effort is normal. No respiratory distress.      Breath sounds: Normal breath sounds. No wheezing, rhonchi or rales.   Abdominal:      General: Abdomen is flat. There is no distension.      Palpations: Abdomen is soft.      Tenderness: There is no abdominal tenderness. There is no guarding.   Musculoskeletal:      Cervical back: Neck supple.   Lymphadenopathy:      Cervical: No cervical adenopathy.   Skin:     General: Skin is warm and dry.   Neurological:      Mental Status: He is alert.                   "

## 2024-01-05 NOTE — PATIENT INSTRUCTIONS
Your symptoms are likely due to a viral illness. COVID/Flu testing was sent out. Results will be available on [facility] in about 24-48 hours. The mainstay of treatment is for symptom relief, see below for recommended medications.  Fever/Body Aches: We recommend you take 600mg ibuprofen every 6 hours or tylenol 650mg every 6 hours as needed for fever. If needed, you can alternate these medications so that you take one medication every 3 hours. For instance, at noon take ibuprofen, then at 3pm take tylenol, then at 6pm take ibuprofen.   Cough: Delsym, an over the counter cough medication may be used every 12 hours as needed.  Mucinex XR (guafenisen) 600 mg tablets may be used to thin out the mucous to make it easier to cough up.  You may take 1-2 tablets twice per day as needed. Utilizing a vaporizer/humidifier may help, as well as increasing fluids.  Sore Throat: Salt water gargles with 1 teaspoon of salt dissolved in 6-8 oz of water as needed can help with a sore throat, as can honey, drinking plenty of liquids, eating soft foods. If severe, can utilize OTC chloraseptic spray.  Nasal Congestion: Over the counter allergy medication like Claritin, Allegra or Zyrtec can help with nasal congestion and post nasal drip.  Over the counter saline or steroid nasal sprays like Flonase can help with nasal congestion and post nasal drip as well. Over the counter decongestants such as Sudafed may also help.  Upset Stomach: Drink plenty of fluids. May utilize Gatorade, Pedialyte, or other electrolyte solutions to supplement. Eat bland foods (ex: BRAT - bananas, rice, applesauce, toast) and slowly advance to other foods as tolerated.  Please note, if you have heart issues or high blood pressure, the cough/cold medication of choice is Coricidin. Talk to your doctor before trying any new medications.    Follow up with PCP in 3-5 days.  Proceed to  ER if symptoms worsen.    Upper Respiratory Infection   AMBULATORY CARE:   An upper  respiratory infection  is also called a cold. Your nose, throat, ears, and sinuses may be affected. You are more likely to get a cold in the winter. Your risk of getting a cold may be increased if you smoke cigarettes or have allergies, such as hay fever.  What causes a cold?  A cold is caused by a virus. Many viruses can cause a cold, and each is contagious. This means the virus can be easily spread to another person when the sick person coughs or sneezes. The virus can also be spread if you touch an object the virus is on and then touch your eyes, mouth, or nose.  Cold symptoms  are usually worst for the first 3 to 5 days. You may have any of the following:  Runny or stuffy nose    Sneezing and coughing    Sore throat or hoarseness    Red, watery, and sore eyes    Fatigue (you feel more tired than usual)    Chills and fever    Headache, body aches, or sore muscles    Call your local emergency number (911 in the ) if:   You have chest pain or trouble breathing.      Seek care immediately if:   You have a fever over 102ºF (39ºC).      Call your doctor if:   You have a low fever.    Your sore throat gets worse or you see white or yellow spots in your throat.    Your symptoms get worse after 3 to 5 days or are not better in 14 days.    You have a rash anywhere on your skin.    You have large, tender lumps in your neck.    You have thick, green, or yellow drainage from your nose.    You cough up thick yellow, green, or bloody mucus.    You have a bad earache.    You have questions or concerns about your condition or care.    Treatment:  Colds are caused by viruses and do not get better with antibiotics. Most people get better in 7 to 14 days. You may continue to cough for 2 to 3 weeks. The following may help decrease your symptoms:  Decongestants  help reduce nasal congestion and help you breathe more easily. If you take decongestant pills, they may make you feel restless or not able to sleep. Do not use decongestant  sprays for more than a few days.    Cough suppressants  help reduce coughing. Ask your healthcare provider which type of cough medicine is best for you.     NSAIDs , such as ibuprofen, help decrease swelling, pain, and fever. NSAIDs can cause stomach bleeding or kidney problems in certain people. If you take blood thinner medicine, always ask your healthcare provider if NSAIDs are safe for you. Always read the medicine label and follow directions.    Acetaminophen  decreases pain and fever. It is available without a doctor's order. Ask how much to take and how often to take it. Follow directions. Read the labels of all other medicines you are using to see if they also contain acetaminophen, or ask your doctor or pharmacist. Acetaminophen can cause liver damage if not taken correctly.    Manage a cold:   Rest as much as possible.  Slowly start to do more each day.    Drink more liquids as directed.  Liquids will help thin and loosen mucus so you can cough it up. Liquids will also help prevent dehydration. Liquids that help prevent dehydration include water, fruit juice, and broth. Do not drink liquids that contain caffeine. Caffeine can increase your risk for dehydration. Ask your healthcare provider how much liquid to drink each day.    Soothe a sore throat.  Gargle with warm salt water. Make salt water by dissolving ¼ teaspoon salt in 1 cup warm water. You may also suck on hard candy or throat lozenges. You may use a sore throat spray.    Use a humidifier or vaporizer.  Use a cool mist humidifier or a vaporizer to increase air moisture in your home. This may make it easier for you to breathe and help decrease your cough.    Use saline nasal drops as directed.  These help relieve congestion.    Apply petroleum-based jelly around the outside of your nostrils.  This can decrease irritation from blowing your nose.    Do not smoke.  Nicotine and other chemicals in cigarettes and cigars can make your symptoms worse. They  can also cause infections such as bronchitis or pneumonia. Ask your healthcare provider for information if you currently smoke and need help to quit. E-cigarettes or smokeless tobacco still contain nicotine. Talk to your healthcare provider before you use these products.    Prevent a cold:   Wash your hands often.  Use soap and water every time you wash your hands. Rub your soapy hands together, lacing your fingers. Use the fingers of one hand to scrub under the nails of the other hand. Wash for at least 20 seconds. Rinse with warm, running water for several seconds. Then dry your hands. Use hand  gel if soap and water are not available. Do not touch your eyes or mouth without washing your hands first.         Cover a sneeze or cough.  Use a tissue that covers your mouth and nose. Put the used tissue in the trash right away. Use the bend of your arm if a tissue is not available. Wash your hands well with soap and water or use a hand . Do not stand close to anyone who is sneezing or coughing.    Try to stay away from others while you are sick.  This is especially important during the first 2 to 3 days when the virus is more easily spread. Wait until a fever, cough, or other symptoms are gone before you return to work or other regular activities.    Do not share items while you are sick.  This includes food, drinks, eating utensils, and dishes.    Follow up with your doctor as directed:  Write down your questions so you remember to ask them during your visits.  © Copyright [employer] 2023 Information is for End User's use only and may not be sold, redistributed or otherwise used for commercial purposes.  The above information is an  only. It is not intended as medical advice for individual conditions or treatments. Talk to your doctor, nurse or pharmacist before following any medical regimen to see if it is safe and effective for you.

## 2024-01-06 LAB
FLUAV RNA RESP QL NAA+PROBE: NEGATIVE
FLUBV RNA RESP QL NAA+PROBE: NEGATIVE
SARS-COV-2 RNA RESP QL NAA+PROBE: POSITIVE

## 2024-01-11 NOTE — TELEPHONE ENCOUNTER
Patient called to let provider know that the script he ordered costs $200.00 through [facility].   desoximetasone (TOPICORT) 0.25 % ointment     Is there an alternative that can be sent ?    I advised in the mean time to use Triamcinolone 0.1% ointment on the rash located on his hip twice a day for 14 days if no improvements to return call back.    Patient understands.

## 2024-01-28 PROBLEM — R05.1 ACUTE COUGH: Status: RESOLVED | Noted: 2023-11-29 | Resolved: 2024-01-28

## 2024-03-08 DIAGNOSIS — E03.8 SUBCLINICAL HYPOTHYROIDISM: Primary | ICD-10-CM

## 2024-04-19 ENCOUNTER — NURSE TRIAGE (OUTPATIENT)
Age: 79
End: 2024-04-19

## 2024-04-19 DIAGNOSIS — R39.9 UTI SYMPTOMS: Primary | ICD-10-CM

## 2024-04-19 NOTE — TELEPHONE ENCOUNTER
Pt of Dr. Caldwell in [place]. Last seen 12/28/22 for Prostate cancer    Pt called reports having increased issues with urination.  Reports getting up several times at night. Reports started having to self cath again. Reports feels not all the way empty. Also some discomfort. Orders placed for urine testing, reviewed ER precautions increasing hydration and avoid bladder irritants. Pt requesting appt due to issue increasing and having to self cath again. Appt scheduled.   Reason for Disposition  • Urinating more frequently than usual (i.e., frequency)    Protocols used: Urinary Symptoms-ADULT-[place]

## 2024-04-22 NOTE — TELEPHONE ENCOUNTER
Patient called back to reschedule appointment per Roselia's request. I offered him appointment with Manoj for July, first available and explained that this appointment is to be scheduled with a PA per Dr. Caldwell and she insisted on having Roselia call him back.    CB: [phone]

## 2024-04-22 NOTE — TELEPHONE ENCOUNTER
HENRRYM to try and reschedule 5/2 apt with Dr. Caldwell to an AP.  Asked to give a call back to our number.

## 2024-04-26 ENCOUNTER — APPOINTMENT (OUTPATIENT)
Dept: LAB | Facility: CLINIC | Age: 79
End: 2024-04-26
Payer: MEDICARE

## 2024-04-26 DIAGNOSIS — R39.9 UTI SYMPTOMS: ICD-10-CM

## 2024-04-26 LAB
BACTERIA UR QL AUTO: NORMAL /HPF
BILIRUB UR QL STRIP: NEGATIVE
CLARITY UR: CLEAR
COLOR UR: NORMAL
GLUCOSE UR STRIP-MCNC: NEGATIVE MG/DL
HGB UR QL STRIP.AUTO: NEGATIVE
KETONES UR STRIP-MCNC: NEGATIVE MG/DL
LEUKOCYTE ESTERASE UR QL STRIP: NEGATIVE
NITRITE UR QL STRIP: NEGATIVE
NON-SQ EPI CELLS URNS QL MICRO: NORMAL /HPF
PH UR STRIP.AUTO: 6 [PH]
PROT UR STRIP-MCNC: NEGATIVE MG/DL
RBC #/AREA URNS AUTO: NORMAL /HPF
SP GR UR STRIP.AUTO: 1.02 (ref 1–1.03)
UROBILINOGEN UR STRIP-ACNC: <2 MG/DL
WBC #/AREA URNS AUTO: NORMAL /HPF

## 2024-04-26 PROCEDURE — 81001 URINALYSIS AUTO W/SCOPE: CPT

## 2024-04-27 LAB — BACTERIA UR CULT: NORMAL

## 2024-05-07 NOTE — TELEPHONE ENCOUNTER
Called Mark and offered hi and appointment and he states he is urinating well now with catheter he will be in 5/13

## 2024-05-07 NOTE — TELEPHONE ENCOUNTER
Patient calling in again with c/o of worsening dysuria.    He states the last time he urinated on his own was Sunday night. He woke Monday morning and was unable to urinate , CIC and 250 cc output. Did CIC again last night but doesn't recall the amount of output. This morning CIC with 200 cc output. Currently doesn't have any urge. Patient c/o it is very painful when he self caths.    Patient taking Flomax, 1 capsule daily but states sometimes he forgets. Patient states he stopped take 2 a while ago but doesn't remember exactly when.    Patient denies flank or abdominal pain or pressure, denies fever. Does report he has seen small blood clot in urine and his urine is dark yellow. Reports having regular BM's    Drinks about 30 ounces water daily and a cup of milk in the morning. Encouraged to increase water.     Patient has an appt on 5/13/24    Does want to discuss at upcoming appt different catheters that may be less painful. Currently he has 16 F coude.    Please advise if any other recommendations     #[phone]

## 2024-05-07 NOTE — TELEPHONE ENCOUNTER
Can you please call Mark and see if he wants to come into the office today for a nurse visit if we have availability to do a PVR and potentially place a indwelling urinary catheter so he does not have to CIC as it appears to be very painful for him.  He does have an appointment coming up on 5/13 to discuss plan of care moving forward.  Please see what he would like to do and if there is any availability to have him seen today for nurse visit.

## 2024-05-13 ENCOUNTER — OFFICE VISIT (OUTPATIENT)
Dept: UROLOGY | Facility: MEDICAL CENTER | Age: 79
End: 2024-05-13
Payer: MEDICARE

## 2024-05-13 VITALS
SYSTOLIC BLOOD PRESSURE: 124 MMHG | HEART RATE: 78 BPM | HEIGHT: 69 IN | BODY MASS INDEX: 21.33 KG/M2 | DIASTOLIC BLOOD PRESSURE: 70 MMHG | WEIGHT: 144 LBS | OXYGEN SATURATION: 98 %

## 2024-05-13 DIAGNOSIS — C61 PROSTATE CANCER (HCC): ICD-10-CM

## 2024-05-13 DIAGNOSIS — R39.9 UTI SYMPTOMS: ICD-10-CM

## 2024-05-13 DIAGNOSIS — R31.29 MICROSCOPIC HEMATURIA: Primary | ICD-10-CM

## 2024-05-13 DIAGNOSIS — N40.1 BPH WITH OBSTRUCTION/LOWER URINARY TRACT SYMPTOMS: ICD-10-CM

## 2024-05-13 DIAGNOSIS — N13.8 BPH WITH OBSTRUCTION/LOWER URINARY TRACT SYMPTOMS: ICD-10-CM

## 2024-05-13 LAB
BACTERIA UR QL AUTO: NORMAL /HPF
BILIRUB UR QL STRIP: NEGATIVE
CLARITY UR: CLEAR
COLOR UR: COLORLESS
GLUCOSE UR STRIP-MCNC: NEGATIVE MG/DL
HGB UR QL STRIP.AUTO: NEGATIVE
KETONES UR STRIP-MCNC: NEGATIVE MG/DL
LEUKOCYTE ESTERASE UR QL STRIP: NEGATIVE
NITRITE UR QL STRIP: NEGATIVE
NON-SQ EPI CELLS URNS QL MICRO: NORMAL /HPF
PH UR STRIP.AUTO: 6 [PH]
POST-VOID RESIDUAL VOLUME, ML POC: 0 ML
PROT UR STRIP-MCNC: NEGATIVE MG/DL
RBC #/AREA URNS AUTO: NORMAL /HPF
SL AMB  POCT GLUCOSE, UA: ABNORMAL
SL AMB LEUKOCYTE ESTERASE,UA: ABNORMAL
SL AMB POCT BILIRUBIN,UA: ABNORMAL
SL AMB POCT BLOOD,UA: ABNORMAL
SL AMB POCT CLARITY,UA: CLEAR
SL AMB POCT COLOR,UA: YELLOW
SL AMB POCT KETONES,UA: ABNORMAL
SL AMB POCT NITRITE,UA: ABNORMAL
SL AMB POCT PH,UA: 5.5
SL AMB POCT SPECIFIC GRAVITY,UA: <=1.005
SL AMB POCT URINE PROTEIN: ABNORMAL
SL AMB POCT UROBILINOGEN: 0.2
SP GR UR STRIP.AUTO: 1.01 (ref 1–1.03)
UROBILINOGEN UR STRIP-ACNC: <2 MG/DL
WBC #/AREA URNS AUTO: NORMAL /HPF

## 2024-05-13 PROCEDURE — 51798 US URINE CAPACITY MEASURE: CPT

## 2024-05-13 PROCEDURE — 81001 URINALYSIS AUTO W/SCOPE: CPT

## 2024-05-13 PROCEDURE — 99214 OFFICE O/P EST MOD 30 MIN: CPT

## 2024-05-13 PROCEDURE — 81003 URINALYSIS AUTO W/O SCOPE: CPT

## 2024-05-13 RX ORDER — OXYBUTYNIN CHLORIDE 5 MG/1
5 TABLET ORAL 3 TIMES DAILY
Qty: 15 TABLET | Refills: 0 | Status: SHIPPED | OUTPATIENT
Start: 2024-05-13

## 2024-06-05 ENCOUNTER — TELEPHONE (OUTPATIENT)
Dept: FAMILY MEDICINE CLINIC | Facility: CLINIC | Age: 79
End: 2024-06-05

## 2024-06-28 DIAGNOSIS — I47.19 PAROXYSMAL ATRIAL TACHYCARDIA: ICD-10-CM

## 2024-06-28 DIAGNOSIS — C61 PROSTATE CANCER (HCC): Primary | ICD-10-CM

## 2024-09-03 ENCOUNTER — TELEPHONE (OUTPATIENT)
Age: 79
End: 2024-09-03

## 2024-09-03 LAB
ALBUMIN SERPL-MCNC: 4.3 G/DL (ref 3.6–5.1)
ALBUMIN/GLOB SERPL: 2 (CALC) (ref 1–2.5)
ALP SERPL-CCNC: 47 U/L (ref 35–144)
ALT SERPL-CCNC: 26 U/L (ref 9–46)
AST SERPL-CCNC: 25 U/L (ref 10–35)
BASOPHILS # BLD AUTO: 32 CELLS/UL (ref 0–200)
BASOPHILS NFR BLD AUTO: 0.7 %
BILIRUB SERPL-MCNC: 0.8 MG/DL (ref 0.2–1.2)
BUN SERPL-MCNC: 18 MG/DL (ref 7–25)
BUN/CREAT SERPL: NORMAL (CALC) (ref 6–22)
CALCIUM SERPL-MCNC: 9.3 MG/DL (ref 8.6–10.3)
CHLORIDE SERPL-SCNC: 102 MMOL/L (ref 98–110)
CO2 SERPL-SCNC: 32 MMOL/L (ref 20–32)
CREAT SERPL-MCNC: 0.96 MG/DL (ref 0.7–1.22)
EOSINOPHIL # BLD AUTO: 180 CELLS/UL (ref 15–500)
EOSINOPHIL NFR BLD AUTO: 4 %
ERYTHROCYTE [DISTWIDTH] IN BLOOD BY AUTOMATED COUNT: 13.1 % (ref 11–15)
GFR/BSA.PRED SERPLBLD CYS-BASED-ARV: 80 ML/MIN/1.73M2
GLOBULIN SER CALC-MCNC: 2.2 G/DL (CALC) (ref 1.9–3.7)
GLUCOSE SERPL-MCNC: 93 MG/DL (ref 65–99)
HCT VFR BLD AUTO: 43.8 % (ref 38.5–50)
HGB BLD-MCNC: 14.1 G/DL (ref 13.2–17.1)
LYMPHOCYTES # BLD AUTO: 1134 CELLS/UL (ref 850–3900)
LYMPHOCYTES NFR BLD AUTO: 25.2 %
MCH RBC QN AUTO: 29.2 PG (ref 27–33)
MCHC RBC AUTO-ENTMCNC: 32.2 G/DL (ref 32–36)
MCV RBC AUTO: 90.7 FL (ref 80–100)
MONOCYTES # BLD AUTO: 369 CELLS/UL (ref 200–950)
MONOCYTES NFR BLD AUTO: 8.2 %
NEUTROPHILS # BLD AUTO: 2786 CELLS/UL (ref 1500–7800)
NEUTROPHILS NFR BLD AUTO: 61.9 %
PLATELET # BLD AUTO: 184 THOUSAND/UL (ref 140–400)
PMV BLD REES-ECKER: 9.9 FL (ref 7.5–12.5)
POTASSIUM SERPL-SCNC: 4.1 MMOL/L (ref 3.5–5.3)
PROT SERPL-MCNC: 6.5 G/DL (ref 6.1–8.1)
PSA SERPL-MCNC: 0.08 NG/ML
RBC # BLD AUTO: 4.83 MILLION/UL (ref 4.2–5.8)
SODIUM SERPL-SCNC: 141 MMOL/L (ref 135–146)
T4 FREE SERPL-MCNC: 0.8 NG/DL (ref 0.8–1.8)
TSH SERPL-ACNC: 11.49 MIU/L (ref 0.4–4.5)
WBC # BLD AUTO: 4.5 THOUSAND/UL (ref 3.8–10.8)

## 2024-09-03 NOTE — TELEPHONE ENCOUNTER
Kary from [facility] called in regards to wanting to know if the order for Digitoxin was ordered correctly due to patient stating he is taking something different.

## 2024-09-06 ENCOUNTER — OFFICE VISIT (OUTPATIENT)
Dept: CARDIOLOGY CLINIC | Facility: CLINIC | Age: 79
End: 2024-09-06
Payer: MEDICARE

## 2024-09-06 VITALS
OXYGEN SATURATION: 100 % | HEIGHT: 69 IN | SYSTOLIC BLOOD PRESSURE: 118 MMHG | BODY MASS INDEX: 21.18 KG/M2 | HEART RATE: 69 BPM | DIASTOLIC BLOOD PRESSURE: 66 MMHG | WEIGHT: 143 LBS

## 2024-09-06 DIAGNOSIS — I47.29 NON-SUSTAINED VENTRICULAR TACHYCARDIA (HCC): ICD-10-CM

## 2024-09-06 DIAGNOSIS — E78.5 HYPERLIPIDEMIA, UNSPECIFIED HYPERLIPIDEMIA TYPE: ICD-10-CM

## 2024-09-06 DIAGNOSIS — I25.10 CHRONIC CORONARY ARTERY DISEASE: Primary | ICD-10-CM

## 2024-09-06 DIAGNOSIS — I47.19 PAROXYSMAL ATRIAL TACHYCARDIA: ICD-10-CM

## 2024-09-06 PROCEDURE — 93000 ELECTROCARDIOGRAM COMPLETE: CPT | Performed by: INTERNAL MEDICINE

## 2024-09-06 PROCEDURE — 99203 OFFICE O/P NEW LOW 30 MIN: CPT | Performed by: INTERNAL MEDICINE

## 2024-09-06 NOTE — PROGRESS NOTES
Cardiology Follow Up    Gary J Kocher  1945  [id number]  [facility] CARDIAC CATH LAB  [address] [address]  [phone]    1. Chronic coronary artery disease  POCT ECG      2. Paroxysmal atrial tachycardia        3. Non-sustained ventricular tachycardia (HCC)        4. Hyperlipidemia, unspecified hyperlipidemia type            Interval History: Cardiology follow-up.  Continuation of cardiac care.  Multiple records reviewed, imaging was reviewed as well.  Pleasant 78-year-old male who has known coronary disease.  Previous angioplasty of the LAD by myself 12 years ago.  Patent stent on catheterization in 2020.  He is usual cardiologist is retiring, the patient is doing well from a cardiac point of view currently is active for age.  No exertional symptoms denies any chest pain or dyspnea.  No palpitations, no syncope or presyncope.  Compliant with low-cholesterol diet, direct LDL last year was 79, previously HDL 42 with a LDL of 75.  He is on medium intense statin therapy.  Compliant with low-sodium diet, blood pressures been well-controlled, today is 118/66.  The patient history of paroxysmal tachycardia, atrial questionable ventricular although I do not have documentation of that.  He is on chronic digoxin therapy not on beta-blocker.  Last level was subtherapeutic.  No bleeding issues on chronic aspirin therapy.    Patient Active Problem List   Diagnosis    Prostate cancer (HCC)    Chronic coronary artery disease    Hyperlipidemia    Nuclear sclerosis of right eye    Osteoporosis    Bilateral primary osteoarthritis of knee    Vitamin D deficiency    Dermatitis    Pruritus    Subclinical hypothyroidism    Abnormal nuclear stress test    History of colonoscopy    Paroxysmal atrial tachycardia    ILD (interstitial lung disease) (HCC)    Primary osteoarthritis of left knee    Non-sustained ventricular tachycardia (HCC)      Past Medical History:   Diagnosis Date    Diverticulitis w/o hemorrhage 2008    Dyspepsia 2005    Multiple pulmonary nodules 9/24/2013    Nephrolithiasis     Pectus excavatum     PVC (premature ventricular contraction) 03/30/2012     Social History     Socioeconomic History    Marital status:      Spouse name: Not on file    Number of children: Not on file    Years of education: Not on file    Highest education level: Not on file   Occupational History    Occupation: retired - [employer]    Tobacco Use    Smoking status: Never    Smokeless tobacco: Never    Tobacco comments:     no exposure to passive smoke   Vaping Use    Vaping status: Never Used   Substance and Sexual Activity    Alcohol use: No    Drug use: No    Sexual activity: Never   Other Topics Concern    Not on file   Social History Narrative    Not on file     Social Determinants of Health     Financial Resource Strain: Not on file   Food Insecurity: Not on file   Transportation Needs: Not on file   Physical Activity: Not on file   Stress: Not on file   Social Connections: Not on file   Intimate Partner Violence: Not on file   Housing Stability: Not on file      Family History   Problem Relation Age of Onset    Hypertension Mother     Stroke Mother     Diabetes Mother     Gout Father     Alzheimer's disease Father     Diabetes Family         Mellitus     Past Surgical History:   Procedure Laterality Date    CATARACT EXTRACTION Right 06/2018    CATARACT EXTRACTION Left 08/30/2018    COLONOSCOPY W/ POLYPECTOMY  04/04/2006    PROSTATE BIOPSY  06/03/2016    Prostate Needle Biopsy     SHOULDER SURGERY Left     Arthrocentesis of the shoulder sub-=acronial bursa area    UMBILICAL HERNIA REPAIR      UPPER GASTROINTESTINAL ENDOSCOPY         Current Outpatient Medications:     aspirin (ECOTRIN LOW STRENGTH) 81 mg EC tablet, Take 81 mg by mouth, Disp: , Rfl:     atorvastatin (LIPITOR) 10 mg tablet, Take 10 mg by mouth daily  , Disp: , Rfl:      Cholecalciferol (VITAMIN D3) 2000 units capsule, Take 1 capsule by mouth daily, Disp: , Rfl:     digoxin (LANOXIN) 0.125 mg tablet, Take 1 tablet (125 mcg total) by mouth daily, Disp: 90 tablet, Rfl: 3    nitroglycerin (NITROSTAT) 0.4 mg SL tablet, , Disp: , Rfl: 2    oxybutynin (DITROPAN) 5 mg tablet, Take 1 tablet (5 mg total) by mouth 3 (three) times a day, Disp: 15 tablet, Rfl: 0    tamsulosin (FLOMAX) 0.4 mg, Take 2 capsules (0.8 mg total) by mouth daily with dinner, Disp: 180 capsule, Rfl: 3    desoximetasone (TOPICORT) 0.25 % ointment, Apply topically 2 (two) times a day (Patient not taking: Reported on 5/13/2024), Disp: 60 g, Rfl: 1    triamcinolone (KENALOG) 0.1 % ointment, Apply topically 2 (two) times a day To the spots only for 2 weeks straight. (Patient not taking: Reported on 5/13/2024), Disp: 30 g, Rfl: 1  Allergies   Allergen Reactions    Gold-Containing Drug Products Rash     1+ POSITIVE PATCH TEST    Parabens Rash     POSITIVE 1+ PATCH TEST       Labs:  Orders Only on 09/03/2024   Component Date Value    Glucose, Random 09/03/2024 93     BUN 09/03/2024 18     Creatinine 09/03/2024 0.96     eGFR 09/03/2024 80     SL AMB BUN/CREATININE RA* 09/03/2024 SEE NOTE:     Sodium 09/03/2024 141     Potassium 09/03/2024 4.1     Chloride 09/03/2024 102     CO2 09/03/2024 32     Calcium 09/03/2024 9.3     Protein, Total 09/03/2024 6.5     Albumin 09/03/2024 4.3     Globulin 09/03/2024 2.2     Albumin/Globulin Ratio 09/03/2024 2.0     TOTAL BILIRUBIN 09/03/2024 0.8     Alkaline Phosphatase 09/03/2024 47     AST 09/03/2024 25     ALT 09/03/2024 26     White Blood Cell Count 09/03/2024 4.5     Red Blood Cell Count 09/03/2024 4.83     Hemoglobin 09/03/2024 14.1     HCT 09/03/2024 43.8     MCV 09/03/2024 90.7     MCH 09/03/2024 29.2     MCHC 09/03/2024 32.2     RDW 09/03/2024 13.1     Platelet Count 09/03/2024 184     SL AMB MPV 09/03/2024 9.9     Neutrophils (Absolute) 09/03/2024 2,786     Lymphocytes (Absolute)  09/03/2024 1,134     Monocytes (Absolute) 09/03/2024 369     Eosinophils (Absolute) 09/03/2024 180     Basophils ABS 09/03/2024 32     Neutrophils 09/03/2024 61.9     Lymphocytes 09/03/2024 25.2     Monocytes 09/03/2024 8.2     Eosinophils 09/03/2024 4.0     Basophils PCT 09/03/2024 0.7     Free t4 09/03/2024 0.8     TSH 09/03/2024 11.49 (H)     Prostate Specific Antige* 09/03/2024 0.08    Office Visit on 05/13/2024   Component Date Value     COLOR,UA 05/13/2024 Yellow     CLARITY,UA 05/13/2024 Clear     SPECIFIC GRAVITY,UA 05/13/2024 <=1.005      PH,UA 05/13/2024 5.5     LEUKOCYTE ESTERASE,UA 05/13/2024 Neg     NITRITE,UA 05/13/2024 Neg     GLUCOSE, UA 05/13/2024 Neg     KETONES,UA 05/13/2024 Neg     BILIRUBIN,UA 05/13/2024 Neg     BLOOD,UA 05/13/2024 Small     POCT URINE PROTEIN 05/13/2024 Neg     SL AMB POCT UROBILINOGEN 05/13/2024 0.2     POST-VOID RESIDUAL VOLUM* 05/13/2024 0     Color, UA 05/13/2024 Colorless     Clarity, UA 05/13/2024 Clear     Specific Gravity, UA 05/13/2024 1.007     pH, UA 05/13/2024 6.0     Leukocytes, UA 05/13/2024 Negative     Nitrite, UA 05/13/2024 Negative     Protein, UA 05/13/2024 Negative     Glucose, UA 05/13/2024 Negative     Ketones, UA 05/13/2024 Negative     Urobilinogen, UA 05/13/2024 <2.0     Bilirubin, UA 05/13/2024 Negative     Occult Blood, UA 05/13/2024 Negative     RBC, UA 05/13/2024 None Seen     WBC, UA 05/13/2024 1-2     Epithelial Cells 05/13/2024 None Seen     Bacteria, UA 05/13/2024 None Seen    Appointment on 04/26/2024   Component Date Value    Color, UA 04/26/2024 Light Yellow     Clarity, UA 04/26/2024 Clear     Specific Gravity, UA 04/26/2024 1.017     pH, UA 04/26/2024 6.0     Leukocytes, UA 04/26/2024 Negative     Nitrite, UA 04/26/2024 Negative     Protein, UA 04/26/2024 Negative     Glucose, UA 04/26/2024 Negative     Ketones, UA 04/26/2024 Negative     Urobilinogen, UA 04/26/2024 <2.0     Bilirubin, UA 04/26/2024 Negative     Occult Blood, UA 04/26/2024  Negative     RBC, UA 04/26/2024 1-2     WBC, UA 04/26/2024 1-2     Epithelial Cells 04/26/2024 None Seen     Bacteria, UA 04/26/2024 None Seen      Imaging: No results found.    Review of Systems:  Review of Systems   Constitutional:  Negative for activity change, chills, diaphoresis, fatigue and fever.   HENT:  Negative for hearing loss, nosebleeds and trouble swallowing.    Eyes:  Negative for visual disturbance.   Respiratory:  Negative for apnea, chest tightness, shortness of breath, wheezing and stridor.    Cardiovascular:  Negative for chest pain, palpitations and leg swelling.   Gastrointestinal:  Negative for abdominal pain, anal bleeding, blood in stool, constipation, diarrhea, nausea and vomiting.   Endocrine: Negative for cold intolerance and heat intolerance.   Genitourinary:  Positive for frequency. Negative for hematuria.   Musculoskeletal:  Positive for arthralgias and gait problem. Negative for myalgias.   Skin:  Negative for pallor and rash.   Allergic/Immunologic: Negative for immunocompromised state.   Neurological:  Negative for dizziness, tremors, syncope, speech difficulty, weakness, numbness and headaches.   Hematological:  Bruises/bleeds easily.   Psychiatric/Behavioral:  Positive for sleep disturbance. The patient is not nervous/anxious.        Physical Exam:  Physical Exam  Vitals reviewed.   Constitutional:       General: He is not in acute distress.     Appearance: Normal appearance. He is normal weight. He is not ill-appearing, toxic-appearing or diaphoretic.   Eyes:      General: No scleral icterus.     Conjunctiva/sclera: Conjunctivae normal.   Neck:      Vascular: No carotid bruit.   Cardiovascular:      Rate and Rhythm: Normal rate and regular rhythm.      Pulses: Normal pulses.      Heart sounds: Normal heart sounds. No murmur heard.     No friction rub. No gallop.   Pulmonary:      Effort: Pulmonary effort is normal. No respiratory distress.      Breath sounds: Normal breath  sounds. No stridor. No wheezing, rhonchi or rales.   Abdominal:      General: Abdomen is flat. Bowel sounds are normal. There is no distension.      Palpations: Abdomen is soft.      Tenderness: There is no abdominal tenderness.   Musculoskeletal:      Right lower leg: No edema.      Left lower leg: No edema.   Skin:     General: Skin is warm and dry.      Capillary Refill: Capillary refill takes less than 2 seconds.      Coloration: Skin is not jaundiced or pale.      Findings: No bruising or erythema.   Neurological:      Mental Status: He is alert and oriented to person, place, and time.   Psychiatric:         Mood and Affect: Mood normal.         Behavior: Behavior normal.         Thought Content: Thought content normal.         Judgment: Judgment normal.         Discussion/Summary: Coronary artery disease, PTCA stent of the LAD in 2012.  Cardiac catheterization 2020, revealed patent stent in the LAD with a proximal 40% LAD lesion with normal left ventricular systolic function.  60% proximal RCA lesion and irregularities in the circumflex system.  Stress test 2023, he did 5 minutes on the Vikas protocol.  Achieving target heart rate.  There was no angina.  Borderline nondiagnostic EKG changes were noted.  PVCs were noted.  Clinically stable, continue current medications.  Patient is not on beta-blocker.  Atrial tachycardia and injection therapy, Holter monitor this year revealed normal sinus rhythm without any sustained tachyarrhythmias or bradyarrhythmias.    This note was completed in part utilizing [employer] direct voice recognition software.   Grammatical errors, random word insertion, spelling mistakes, and incomplete sentences may be an occasional consequence of the system secondary to software limitations, ambient noise and hardware issues. At the time of dictation, efforts were made to edit, clarify and /or correct errors.  Please read the chart carefully and recognize, using context, where  substitutions have occurred.  If you have any questions or concerns about the context, text or information contained within the body of this dictation, please contact myself, the provider, for further clarification.

## 2024-09-10 LAB
ALBUMIN SERPL-MCNC: 4.3 G/DL (ref 3.6–5.1)
ALBUMIN/GLOB SERPL: 2 (CALC) (ref 1–2.5)
ALP SERPL-CCNC: 47 U/L (ref 35–144)
ALT SERPL-CCNC: 26 U/L (ref 9–46)
AST SERPL-CCNC: 25 U/L (ref 10–35)
BASOPHILS # BLD AUTO: 32 CELLS/UL (ref 0–200)
BASOPHILS NFR BLD AUTO: 0.7 %
BILIRUB SERPL-MCNC: 0.8 MG/DL (ref 0.2–1.2)
BUN SERPL-MCNC: 18 MG/DL (ref 7–25)
BUN/CREAT SERPL: NORMAL (CALC) (ref 6–22)
CALCIUM SERPL-MCNC: 9.3 MG/DL (ref 8.6–10.3)
CHLORIDE SERPL-SCNC: 102 MMOL/L (ref 98–110)
CO2 SERPL-SCNC: 32 MMOL/L (ref 20–32)
CREAT SERPL-MCNC: 0.96 MG/DL (ref 0.7–1.22)
DIGITOXIN SERPL-MCNC: NORMAL NG/ML
EOSINOPHIL # BLD AUTO: 180 CELLS/UL (ref 15–500)
EOSINOPHIL NFR BLD AUTO: 4 %
ERYTHROCYTE [DISTWIDTH] IN BLOOD BY AUTOMATED COUNT: 13.1 % (ref 11–15)
GFR/BSA.PRED SERPLBLD CYS-BASED-ARV: 80 ML/MIN/1.73M2
GLOBULIN SER CALC-MCNC: 2.2 G/DL (CALC) (ref 1.9–3.7)
GLUCOSE SERPL-MCNC: 93 MG/DL (ref 65–99)
HCT VFR BLD AUTO: 43.8 % (ref 38.5–50)
HGB BLD-MCNC: 14.1 G/DL (ref 13.2–17.1)
LYMPHOCYTES # BLD AUTO: 1134 CELLS/UL (ref 850–3900)
LYMPHOCYTES NFR BLD AUTO: 25.2 %
MCH RBC QN AUTO: 29.2 PG (ref 27–33)
MCHC RBC AUTO-ENTMCNC: 32.2 G/DL (ref 32–36)
MCV RBC AUTO: 90.7 FL (ref 80–100)
MONOCYTES # BLD AUTO: 369 CELLS/UL (ref 200–950)
MONOCYTES NFR BLD AUTO: 8.2 %
NEUTROPHILS # BLD AUTO: 2786 CELLS/UL (ref 1500–7800)
NEUTROPHILS NFR BLD AUTO: 61.9 %
PLATELET # BLD AUTO: 184 THOUSAND/UL (ref 140–400)
PMV BLD REES-ECKER: 9.9 FL (ref 7.5–12.5)
POTASSIUM SERPL-SCNC: 4.1 MMOL/L (ref 3.5–5.3)
PROT SERPL-MCNC: 6.5 G/DL (ref 6.1–8.1)
PSA SERPL-MCNC: 0.08 NG/ML
RBC # BLD AUTO: 4.83 MILLION/UL (ref 4.2–5.8)
SODIUM SERPL-SCNC: 141 MMOL/L (ref 135–146)
T4 FREE SERPL-MCNC: 0.8 NG/DL (ref 0.8–1.8)
TSH SERPL-ACNC: 11.49 MIU/L (ref 0.4–4.5)
WBC # BLD AUTO: 4.5 THOUSAND/UL (ref 3.8–10.8)

## 2024-09-11 ENCOUNTER — TELEPHONE (OUTPATIENT)
Age: 79
End: 2024-09-11

## 2024-09-11 LAB
CHOLEST SERPL-MCNC: 138 MG/DL
CHOLEST/HDLC SERPL: 3.1 (CALC)
HDLC SERPL-MCNC: 45 MG/DL
LDLC SERPL CALC-MCNC: 78 MG/DL (CALC)
NONHDLC SERPL-MCNC: 93 MG/DL (CALC)
TRIGL SERPL-MCNC: 72 MG/DL

## 2024-09-11 NOTE — TELEPHONE ENCOUNTER
Pt concerned at improper labs being ordered/drawn. His two main concerns are he is on digoxin, but the test ordered was digitoxin. He states this is wrong. The second concern is he was supposed to have a lipid ordered and drawn with the 9/3 blood work but it never was. He was fasting for the 9/3 blood draw, so it wasn't that. He wants to know how these mistakes were made, if its too many ordering doctors, or if the lab didn't drawn the right tests at the right time? He also would like his next labs ordered as early as possible and get copies of them so he can look them over and avoid this happening again. Please advise/please call pt back.

## 2024-09-11 NOTE — TELEPHONE ENCOUNTER
Group Therapy Note    Date: 1/30/2023    Group Start Time: 1350  Group End Time: 6761  Group Topic: Healthy Living/Wellness    [facility]    Mik Colon RN; Riana Dickerson RN        Group Therapy Note    Attendees: 8/19       Patient's Goal:  To learn about and discuss boundaries and communication. Notes:  Pt participated in group. Status After Intervention:  Improved    Participation Level:  Active Listener and Interactive    Participation Quality: Appropriate      Speech:  normal      Thought Process/Content: Logical      Affective Functioning: Congruent      Mood:  WNL      Level of consciousness:  Attentive      Response to Learning: Able to verbalize/acknowledge new learning and Progressing to goal      Endings: None Reported    Modes of Intervention: Education, Support, and Problem-solving      Discipline Responsible: Registered Nurse      Signature:  Mik Colon RN Please advice.

## 2024-09-12 NOTE — TELEPHONE ENCOUNTER
Patient returned phone call. I advised him of the provider message. He requested a sooner appointment that is not a wellness visit to discuss the blood work and other concerns.  Please call patient to advise.

## 2024-09-12 NOTE — TELEPHONE ENCOUNTER
"LVM to pt due to PCP message.    \"It was a mistake by the ordering provider and we apologize. We can discuss the blood work results in person and move up his medicare wellness \"  "

## 2024-09-12 NOTE — TELEPHONE ENCOUNTER
"I wasn't able to schedule the patient because I am unaware how early his appointment can be made. Please call patient to schedule. He is aware that the call may show as \"restricted\" on his phone and he will  the phone.   "

## 2024-09-17 ENCOUNTER — OFFICE VISIT (OUTPATIENT)
Dept: FAMILY MEDICINE CLINIC | Facility: CLINIC | Age: 79
End: 2024-09-17

## 2024-09-17 VITALS
RESPIRATION RATE: 18 BRPM | SYSTOLIC BLOOD PRESSURE: 114 MMHG | HEART RATE: 63 BPM | WEIGHT: 143 LBS | DIASTOLIC BLOOD PRESSURE: 76 MMHG | OXYGEN SATURATION: 100 % | HEIGHT: 69 IN | TEMPERATURE: 98.4 F | BODY MASS INDEX: 21.18 KG/M2

## 2024-09-17 DIAGNOSIS — Z00.00 MEDICARE ANNUAL WELLNESS VISIT, SUBSEQUENT: Primary | ICD-10-CM

## 2024-09-17 DIAGNOSIS — C61 PROSTATE CANCER (HCC): ICD-10-CM

## 2024-09-17 DIAGNOSIS — J84.9 ILD (INTERSTITIAL LUNG DISEASE) (HCC): ICD-10-CM

## 2024-09-17 DIAGNOSIS — I47.19 PAROXYSMAL ATRIAL TACHYCARDIA (HCC): ICD-10-CM

## 2024-09-17 DIAGNOSIS — E03.8 SUBCLINICAL HYPOTHYROIDISM: Chronic | ICD-10-CM

## 2024-09-17 DIAGNOSIS — E55.9 VITAMIN D DEFICIENCY: ICD-10-CM

## 2024-09-17 DIAGNOSIS — Z23 ENCOUNTER FOR IMMUNIZATION: ICD-10-CM

## 2024-09-17 DIAGNOSIS — E78.5 HYPERLIPIDEMIA, UNSPECIFIED HYPERLIPIDEMIA TYPE: ICD-10-CM

## 2024-09-17 NOTE — ASSESSMENT & PLAN NOTE
Lab Results   Component Value Date    LDLCALC 78 09/10/2024     Stable on atorvastatin 10mg  Encouraged low fat diet  Goal ldl is <70 due to hx of cad s/p stenting  Orders:    Lipid panel; Future

## 2024-09-17 NOTE — ASSESSMENT & PLAN NOTE
Stable  Remains in nsr  Rhythm controlled on digoxin 125mcg  Not on anticoagulation    Orders:    Digoxin level; Future    Comprehensive metabolic panel; Future

## 2024-09-17 NOTE — PROGRESS NOTES
Ambulatory Visit  Name: Gary J Kocher      : 1945      MRN: [id number]  Encounter Provider: Tj Lara DO  Encounter Date: 2024   Encounter department: [facility]    Assessment & Plan  Medicare annual wellness visit, subsequent         Paroxysmal atrial tachycardia  Stable  Remains in nsr  Rhythm controlled on digoxin 125mcg  Not on anticoagulation    Orders:    Digoxin level; Future    Comprehensive metabolic panel; Future    ILD (interstitial lung disease) (HCC)  Stable  Breathing is at baseline  Follows with pulmonology, Dr. Schmidt         Prostate cancer (HCC)  S/p radiation treatment  He reports nocturia 3-5 times per night  He limits fluid intake after 6pm. Goes to bed at midnight  Avoids bladder irritants  Recommend following up with urology    Orders:    PSA Total, Diagnostic; Future    Vitamin D deficiency    Orders:    Vitamin D 25 hydroxy; Future    Subclinical hypothyroidism  Lab Results   Component Value Date    [id number] 4.160 (H) 2019    TSH 11.49 (H) 2024     Patient is asymptomatic  Continue to monitor  Orders:    TSH, 3rd generation with Free T4 reflex; Future    Hyperlipidemia, unspecified hyperlipidemia type  Lab Results   Component Value Date    LDLCALC 78 09/10/2024     Stable on atorvastatin 10mg  Encouraged low fat diet  Goal ldl is <70 due to hx of cad s/p stenting  Orders:    Lipid panel; Future    Encounter for immunization    Orders:    influenza vaccine, high-dose, PF 0.5 mL (Fluzone High Dose)      Depression Screening and Follow-up Plan: Patient was screened for depression during today's encounter. They screened negative with a PHQ-2 score of 0.      Preventive health issues were discussed with patient, and age appropriate screening tests were ordered as noted in patient's After Visit Summary. Personalized health advice and appropriate referrals for health education or preventive services given if needed, as  noted in patient's After Visit Summary.    History of Present Illness     Urinary Frequency   Associated symptoms include frequency. Pertinent negatives include no chills, hematuria, nausea, urgency or vomiting.      Patient Care Team:  Tj Lara DO as PCP - General (Family Medicine)  MD Charlie Murphy MD Minh Q Nguyen, MD Nicholas Michael Cardiges, MD    Review of Systems   Constitutional:  Negative for activity change, chills, diaphoresis and fever.   HENT:  Negative for ear pain, hearing loss, postnasal drip, rhinorrhea, sinus pressure, sinus pain, sneezing and sore throat.    Respiratory:  Negative for cough, chest tightness, shortness of breath and wheezing.    Cardiovascular:  Negative for chest pain, palpitations and leg swelling.   Gastrointestinal:  Negative for abdominal pain, blood in stool, constipation, diarrhea, nausea and vomiting.   Genitourinary:  Positive for frequency. Negative for dysuria, hematuria and urgency.   Musculoskeletal:  Negative for arthralgias and myalgias.   Neurological:  Negative for dizziness, syncope, weakness, light-headedness, numbness and headaches.     Medical History Reviewed by provider this encounter:       Annual Wellness Visit Questionnaire   Mark is here for his Subsequent Wellness visit. Last Medicare Wellness visit information reviewed, patient interviewed and updates made to the record today.      Health Risk Assessment:   Patient rates overall health as very good. Patient feels that their physical health rating is same. Patient is satisfied with their life. Eyesight was rated as same. Hearing was rated as same. Patient feels that their emotional and mental health rating is same. Patients states they are always angry. Patient states they are never, rarely unusually tired/fatigued. Pain experienced in the last 7 days has been some. Patient's pain rating has been 1/10. Patient states that he has experienced no weight loss or gain in  last 6 months.     Depression Screening:   PHQ-2 Score: 0      Fall Risk Screening:   In the past year, patient has experienced: no history of falling in past year      Home Safety:  Patient does not have trouble with stairs inside or outside of their home. Patient has working smoke alarms and has working carbon monoxide detector. Home safety hazards include: none.     Nutrition:   Current diet is Regular.     Medications:   Patient is currently taking over-the-counter supplements. OTC medications include: see medication list. Patient is able to manage medications.     Activities of Daily Living (ADLs)/Instrumental Activities of Daily Living (IADLs):   Walk and transfer into and out of bed and chair?: Yes  Dress and groom yourself?: Yes    Bathe or shower yourself?: Yes    Feed yourself? Yes  Do your laundry/housekeeping?: Yes  Manage your money, pay your bills and track your expenses?: Yes  Make your own meals?: Yes    Do your own shopping?: Yes    Previous Hospitalizations:   Any hospitalizations or ED visits within the last 12 months?: No      Advance Care Planning:   Living will: Yes    Durable POA for healthcare: Yes    Advanced directive: Yes    Advanced directive counseling given: Yes    End of Life Decisions reviewed with patient: Yes    Provider agrees with end of life decisions: Yes      Cognitive Screening:   Provider or family/friend/caregiver concerned regarding cognition?: No    PREVENTIVE SCREENINGS      Cardiovascular Screening:    General: Screening Not Indicated and History Lipid Disorder      Diabetes Screening:     General: Screening Current      Colorectal Cancer Screening:     General: Screening Current      Prostate Cancer Screening:    General: History Prostate Cancer and Screening Not Indicated      Osteoporosis Screening:    General: Screening Not Indicated and History Osteoporosis      Abdominal Aortic Aneurysm (AAA) Screening:        General: Screening Not Indicated      Lung Cancer  "Screening:     General: Screening Not Indicated       No results found.    Objective     Resp 18   Ht 5' 9\" (1.753 m)   Wt 64.9 kg (143 lb)   BMI 21.12 kg/m²     Physical Exam  Vitals reviewed.   Constitutional:       General: He is not in acute distress.     Appearance: He is well-developed. He is not diaphoretic.   HENT:      Head: Normocephalic and atraumatic.      Right Ear: Tympanic membrane, ear canal and external ear normal. There is no impacted cerumen.      Left Ear: Tympanic membrane, ear canal and external ear normal. There is no impacted cerumen.      Nose: Nose normal. No congestion.      Mouth/Throat:      Mouth: Mucous membranes are moist.      Pharynx: Oropharynx is clear.   Eyes:      General: No scleral icterus.        Right eye: No discharge.         Left eye: No discharge.      Conjunctiva/sclera: Conjunctivae normal.      Pupils: Pupils are equal, round, and reactive to light.   Neck:      Vascular: No JVD.   Cardiovascular:      Rate and Rhythm: Normal rate and regular rhythm.      Heart sounds: Normal heart sounds. No murmur heard.     No friction rub.   Pulmonary:      Effort: Pulmonary effort is normal. No respiratory distress.      Breath sounds: Normal breath sounds. No wheezing or rales.   Chest:      Chest wall: No tenderness.   Abdominal:      General: Bowel sounds are normal. There is no distension.      Palpations: Abdomen is soft. There is no mass.      Tenderness: There is no abdominal tenderness. There is no guarding or rebound.   Musculoskeletal:         General: No tenderness or deformity. Normal range of motion.      Cervical back: Normal range of motion and neck supple.   Skin:     General: Skin is warm and dry.      Findings: No erythema or rash.   Neurological:      Mental Status: He is alert and oriented to person, place, and time. Mental status is at baseline.      Cranial Nerves: No cranial nerve deficit.   Psychiatric:         Mood and Affect: Mood normal.         "

## 2024-09-17 NOTE — ASSESSMENT & PLAN NOTE
S/p radiation treatment  He reports nocturia 3-5 times per night  He limits fluid intake after 6pm. Goes to bed at midnight  Avoids bladder irritants  Recommend following up with urology    Orders:    PSA Total, Diagnostic; Future

## 2024-09-17 NOTE — ASSESSMENT & PLAN NOTE
Lab Results   Component Value Date    [id number] 4.160 (H) 12/23/2019    TSH 11.49 (H) 09/03/2024     Patient is asymptomatic  Continue to monitor  Orders:    TSH, 3rd generation with Free T4 reflex; Future

## 2024-09-17 NOTE — PATIENT INSTRUCTIONS
Your TSH level continues to rise.  It is now above 11.  Symptoms of low thyroid are typically things like constipation, depression, slow heart rate, fatigue, hair loss.  If it continues to rise we would consider starting levothyroxine.  This is a derivative of natural thyroid hormone.

## 2024-09-23 ENCOUNTER — TELEPHONE (OUTPATIENT)
Dept: UROLOGY | Facility: MEDICAL CENTER | Age: 79
End: 2024-09-23

## 2024-11-07 ENCOUNTER — OFFICE VISIT (OUTPATIENT)
Dept: FAMILY MEDICINE CLINIC | Facility: CLINIC | Age: 79
End: 2024-11-07
Payer: MEDICARE

## 2024-11-07 VITALS
BODY MASS INDEX: 20.76 KG/M2 | WEIGHT: 140.2 LBS | TEMPERATURE: 97.7 F | HEART RATE: 70 BPM | OXYGEN SATURATION: 94 % | SYSTOLIC BLOOD PRESSURE: 116 MMHG | HEIGHT: 69 IN | DIASTOLIC BLOOD PRESSURE: 60 MMHG

## 2024-11-07 DIAGNOSIS — R05.1 ACUTE COUGH: ICD-10-CM

## 2024-11-07 DIAGNOSIS — J32.9 RHINOSINUSITIS: Primary | ICD-10-CM

## 2024-11-07 PROCEDURE — 99213 OFFICE O/P EST LOW 20 MIN: CPT | Performed by: FAMILY MEDICINE

## 2024-11-07 PROCEDURE — G2211 COMPLEX E/M VISIT ADD ON: HCPCS | Performed by: FAMILY MEDICINE

## 2024-11-07 RX ORDER — GUAIFENESIN 600 MG/1
1200 TABLET, EXTENDED RELEASE ORAL EVERY 12 HOURS SCHEDULED
Qty: 30 TABLET | Refills: 0 | Status: SHIPPED | OUTPATIENT
Start: 2024-11-07

## 2024-11-07 NOTE — PROGRESS NOTES
"       Assessment/Plan:      1. Rhinosinusitis  -     amoxicillin-clavulanate (AUGMENTIN) 875-125 mg per tablet; Take 1 tablet by mouth every 12 (twelve) hours for 7 days  2. Acute cough  -     guaiFENesin (MUCINEX) 600 mg 12 hr tablet; Take 2 tablets (1,200 mg total) by mouth every 12 (twelve) hours          Subjective:      Patient ID: Gary J Kocher is a 79 y.o. male presents today for sick visit.  Symptoms: congestions, productive cough,   Symptom onset: 5 days ago  Medications tried: none  COVID vaccine:   COVID tests: none  Sick contacts: none      HPI    The following portions of the patient's history were reviewed and updated as appropriate: allergies, current medications, past family history, past medical history, past social history, past surgical history, and problem list.    Review of Systems   Constitutional:  Negative for activity change, chills, diaphoresis and fever.   HENT:  Positive for congestion. Negative for ear pain, hearing loss, postnasal drip, rhinorrhea, sinus pressure, sinus pain, sneezing and sore throat.    Respiratory:  Positive for cough. Negative for chest tightness, shortness of breath and wheezing.    Cardiovascular:  Negative for chest pain, palpitations and leg swelling.   Gastrointestinal:  Negative for abdominal pain, blood in stool, constipation, diarrhea, nausea and vomiting.   Genitourinary:  Negative for dysuria, frequency, hematuria and urgency.   Musculoskeletal:  Negative for arthralgias and myalgias.   Neurological:  Negative for dizziness, syncope, weakness, light-headedness, numbness and headaches.         Objective:      /60 (BP Location: Left arm, Patient Position: Sitting, Cuff Size: Adult)   Pulse 70   Temp 97.7 °F (36.5 °C)   Ht 5' 9\" (1.753 m)   Wt 63.6 kg (140 lb 3.2 oz)   SpO2 94%   BMI 20.70 kg/m²          Physical Exam  Vitals reviewed.   Constitutional:       General: He is not in acute distress.     Appearance: He is well-developed. He is not " diaphoretic.   HENT:      Head: Normocephalic and atraumatic.      Right Ear: External ear normal.      Left Ear: External ear normal.      Nose: Nose normal. No congestion.      Mouth/Throat:      Mouth: Mucous membranes are moist.      Pharynx: Oropharynx is clear. No oropharyngeal exudate.   Eyes:      General: No scleral icterus.     Pupils: Pupils are equal, round, and reactive to light.   Neck:      Thyroid: No thyromegaly.      Vascular: No JVD.      Trachea: No tracheal deviation.   Cardiovascular:      Rate and Rhythm: Normal rate and regular rhythm.      Pulses: Normal pulses.      Heart sounds: Normal heart sounds. No murmur heard.     No friction rub.   Pulmonary:      Effort: Pulmonary effort is normal. No respiratory distress.      Breath sounds: Rhonchi present. No wheezing or rales.   Chest:      Chest wall: No tenderness.   Abdominal:      General: Bowel sounds are normal. There is no distension.      Palpations: Abdomen is soft.      Tenderness: There is no abdominal tenderness. There is no right CVA tenderness, left CVA tenderness, guarding or rebound.   Musculoskeletal:         General: No tenderness. Normal range of motion.      Cervical back: Normal range of motion and neck supple. No tenderness.      Right lower leg: No edema.      Left lower leg: No edema.   Lymphadenopathy:      Cervical: No cervical adenopathy.   Skin:     General: Skin is warm and dry.   Neurological:      Mental Status: He is alert and oriented to person, place, and time. Mental status is at baseline.      Deep Tendon Reflexes: Reflexes are normal and symmetric.   Psychiatric:         Mood and Affect: Mood normal.         Behavior: Behavior normal.         Thought Content: Thought content normal.         Judgment: Judgment normal.

## 2024-11-26 ENCOUNTER — HOSPITAL ENCOUNTER (OUTPATIENT)
Dept: CT IMAGING | Facility: HOSPITAL | Age: 79
Discharge: HOME | End: 2024-11-26
Attending: INTERNAL MEDICINE
Payer: MEDICARE

## 2024-11-26 DIAGNOSIS — R10.31 RIGHT LOWER QUADRANT ABDOMINAL PAIN: ICD-10-CM

## 2024-11-26 PROCEDURE — 74177 CT ABD & PELVIS W/CONTRAST: CPT

## 2024-11-26 RX ADMIN — IOHEXOL 100 ML: 350 INJECTION, SOLUTION INTRAVENOUS at 07:50

## 2024-12-16 ENCOUNTER — TELEPHONE (OUTPATIENT)
Age: 79
End: 2024-12-16

## 2024-12-16 DIAGNOSIS — L20.9 ATOPIC DERMATITIS, UNSPECIFIED TYPE: Primary | ICD-10-CM

## 2024-12-16 NOTE — TELEPHONE ENCOUNTER
Patient called asking if we can send in Hydrocortisone 0.25% cream before his next scheduled apt because if he gets it now he only pays $15 if he waits until the new year when he has an apt he will then have to pay $30  If patient's request can be done please send to   [facility] -     Thank you

## 2024-12-17 RX ORDER — HYDROCORTISONE 25 MG/G
OINTMENT TOPICAL 2 TIMES DAILY
Qty: 28.35 G | Refills: 2 | Status: SHIPPED | OUTPATIENT
Start: 2024-12-17

## 2024-12-17 NOTE — TELEPHONE ENCOUNTER
Please clarify with patient, current order is Triamcinolone 0.1% ointment.  I can then send it to his pharmacy.  Next follow up scheduled on 1/9/25.

## 2024-12-17 NOTE — TELEPHONE ENCOUNTER
Spoke with patient. Patient states he is not using the Triamcinolone 0.1% ointment and is using the Hydrocortisone 2.5% ointment. Patient state Dr. Lopes  was the one who prescribed him this in the past.

## 2024-12-26 DIAGNOSIS — R21 RASH: Primary | ICD-10-CM

## 2024-12-26 RX ORDER — HYDROCORTISONE 25 MG/G
OINTMENT TOPICAL 2 TIMES DAILY
Qty: 453 G | Refills: 1 | Status: SHIPPED | OUTPATIENT
Start: 2024-12-26

## 2024-12-26 NOTE — TELEPHONE ENCOUNTER
Patient called because he stated when he spoke to us about the medication needed he asked if we can send it in a tub for 453.6 grams because he uses it a lot and he needs it before end of year.  If someone can look into this please and call patient back if it can or can't be done.  Thank you

## 2024-12-26 NOTE — TELEPHONE ENCOUNTER
left message letting pt know that 453 gram tube was ordered adn sent to his preferred home dleivery service.

## 2025-01-09 ENCOUNTER — OFFICE VISIT (OUTPATIENT)
Age: 80
End: 2025-01-09
Payer: MEDICARE

## 2025-01-09 VITALS — BODY MASS INDEX: 20.38 KG/M2 | WEIGHT: 138 LBS

## 2025-01-09 DIAGNOSIS — D22.62 MULTIPLE BENIGN MELANOCYTIC NEVI OF BOTH UPPER EXTREMITIES, BOTH LOWER EXTREMITIES, AND TRUNK: ICD-10-CM

## 2025-01-09 DIAGNOSIS — D22.5 MULTIPLE BENIGN MELANOCYTIC NEVI OF BOTH UPPER EXTREMITIES, BOTH LOWER EXTREMITIES, AND TRUNK: ICD-10-CM

## 2025-01-09 DIAGNOSIS — D48.9 NEOPLASM OF UNCERTAIN BEHAVIOR: ICD-10-CM

## 2025-01-09 DIAGNOSIS — D22.72 MULTIPLE BENIGN MELANOCYTIC NEVI OF BOTH UPPER EXTREMITIES, BOTH LOWER EXTREMITIES, AND TRUNK: ICD-10-CM

## 2025-01-09 DIAGNOSIS — D22.71 MULTIPLE BENIGN MELANOCYTIC NEVI OF BOTH UPPER EXTREMITIES, BOTH LOWER EXTREMITIES, AND TRUNK: ICD-10-CM

## 2025-01-09 DIAGNOSIS — D18.01 CHERRY ANGIOMA: ICD-10-CM

## 2025-01-09 DIAGNOSIS — D22.61 MULTIPLE BENIGN MELANOCYTIC NEVI OF BOTH UPPER EXTREMITIES, BOTH LOWER EXTREMITIES, AND TRUNK: ICD-10-CM

## 2025-01-09 DIAGNOSIS — L82.1 SEBORRHEIC KERATOSIS: Primary | ICD-10-CM

## 2025-01-09 DIAGNOSIS — D23.9 DERMATOFIBROMA: ICD-10-CM

## 2025-01-09 PROCEDURE — 88342 IMHCHEM/IMCYTCHM 1ST ANTB: CPT | Performed by: STUDENT IN AN ORGANIZED HEALTH CARE EDUCATION/TRAINING PROGRAM

## 2025-01-09 PROCEDURE — 99214 OFFICE O/P EST MOD 30 MIN: CPT | Performed by: REGISTERED NURSE

## 2025-01-09 PROCEDURE — 11102 TANGNTL BX SKIN SINGLE LES: CPT | Performed by: REGISTERED NURSE

## 2025-01-09 PROCEDURE — 88341 IMHCHEM/IMCYTCHM EA ADD ANTB: CPT | Performed by: STUDENT IN AN ORGANIZED HEALTH CARE EDUCATION/TRAINING PROGRAM

## 2025-01-09 PROCEDURE — 88305 TISSUE EXAM BY PATHOLOGIST: CPT | Performed by: STUDENT IN AN ORGANIZED HEALTH CARE EDUCATION/TRAINING PROGRAM

## 2025-01-09 NOTE — PATIENT INSTRUCTIONS
SEBORRHEIC KERATOSES  - Relevant exam: Scattered over the trunk/extremities are waxy brown to black plaques and papules with stuck on appearance  - Exam and clinical history consistent with seborrheic keratoses  - Counseled that these are benign growths that do not require treatment  - Counseled that removal of lesions is considered cosmetic and so would incur a fee should patient elect to move forward.   - Patient to hold on treatments for now but will inform us should they desire additional treatments    MELANOCYTIC NEVI  -Relevant exam: Scattered over the trunk/extremities are homogenously pigmented brown macules and papules. ELM performed and without concerning findings.  - Exam and clinical history consistent with melanocytic nevi  - Educated on the ABCDE's of melanoma; handout provided  - Counseled to return to clinic prior to scheduled appointment should any of these lesions change or should any new lesions of concern arise  - Counseled on use of sun protection daily. Reviewed latest FDA sunscreen guidelines, including use of broad spectrum (UVA and UVB blocking) sunscreen or sun protective clothing with SPF 30-50 every 2-3 hours and reapplied after exposure to water; use of photoprotective clothing, including a broad brim hat and UPF rated clothing if outdoors for several hours; avoid use of tanning beds as these pose significant risk for melanoma and skin cancer.    LENTIGINES  OTHER SKIN CHANGES DUE TO CHRONIC EXPOSURE TO NONIONIZING RADIATION  - Relevant exam: Over sun exposed areas are brown macules. ELM performed and without concerning findings.  - Exam and clinical history consistent with lentigines.  - Educated that these are indicative of prior sun exposure.   - Counseled to return to clinic prior to scheduled appointment should any of these lesions change or should any new lesions of concern arise.  - Recommended use of sunscreen as above and below.  - Counseled on use of sun protection daily.  "Reviewed latest FDA sunscreen guidelines, including use of broad spectrum (UVA and UVB blocking) sunscreen or sun protective clothing with SPF 30-50 every 2-3 hours and reapplied after exposure to water; use of photoprotective clothing, including a broad brim hat and UPF rated clothing if outdoors for several hours; avoid use of tanning beds as these pose significant risk for melanoma and skin cancer.    CHERRY ANGIOMAS  - Relevant exam: Scattered over the trunk/extremities are red papules  - Exam and clinical history consistent with cherry angiomas  - Educated that these are benign  - Educated that removal is considered aesthetic and would incur a fee.  - Patient does not wish to pursue removal at this time but will contact us should this change.    SPIDER TELANGIECTASIS (\"SPIDER ANGIOMA\")    Assessment and Plan:  Based on a thorough discussion of this condition and the management approach to it (including a comprehensive discussion of the known risks, side effects and potential benefits of treatment), the patient (family) agrees to implement the following specific plan:  Reassured benign  Referred to [facility] for cosmetic laser    Spider Telangiectasis  A spider telangiectasis (plural: telangiectases) is composed of dilated blood vessels, and is clinically characterised by its spider-like appearance. It is given that name because it has a central red papule (the body of the 'spider') from which fine red lines (the spider legs) extend radially. An alternative explanation for the name explains that the red lines form a spider-like network. Other names for spider telangiectasis are spider angioma (a misnomer), spider naevus and naevus araneus.      A solitary spider telangiectasis is common in children and adults, affecting 10-15% of the population. Although they can affect people of any race, spider telangiectases are more easily seen in fair skin compared to skin of colour. Multiple spider telangiectases arise most " frequently in pregnancy, in women taking a combined oral contraceptive pill, in patients with liver disease (particularly, in cirrhosis due to alcohol abuse), and in those with thyrotoxicosis.  What causes a spider telangiectasis?    Spider telangiectasis is an acquired vascular malformation. It occurs because of the failure of a tiny muscle restricting the size of an arteriole. Increased pulsating flow through the vessel (the central papule) results in the dilatation of distal vessels (the red lines).  Spider telangiectasis may arise spontaneously or may be induced by circulating oestrogen, which is increased in pregnancy, women taking combined oral contraceptives, and in those with liver disease. Various vascular endothelial growth factors may be involved.    Spider telangiectases are often located on the face, neck, and upper chest (this has been postulated to relate to the distribution of a large vein draining the heart, the superior vena cava). Spider telangiectases may also occur on the hands, arms, or other sites. Spider telangiectases vary in size and number, tending to be larger and more numerous in people with severe liver disease when other cutaneous signs of liver disease may be present such as palmar erythema, leukonychia, and jaundice. A central dilated arteriole may be present without radial capillaries, and the capillaries may vary in diameter, length, and number. They can also be star-shaped.  The lesions may briefly bleed on trauma but otherwise do not cause any symptoms or complications.     A spider telangiectasis is diagnosed by its typical appearance. Compression of the central arteriole results in the disappearance of the radial capillaries, which rapidly refill when the compression is relieved. This is best seen through a transparent object, such as a glass slide or the lens of a contact dermatoscope.     Spider telangiectases are distinct from 'spider veins', which are blue-coloured dilated  "venules arising on the thighs and lower legs and often associated with varicose veins.    What is the treatment for a spider telangiectasis?  A spider telangiectasis is harmless and does not require treatment. A lesion that is unsightly can be removed by destroying the feeding central arteriole, but this may result in a small scar. Treatments to remove spider telangiectasis include:  Cryotherapy  Electrocautery  Intense pulsed light  Vascular laser.  Surgical excision is rarely necessary and inevitably leaves a scar.    Spider telangiectases can persist or disappear. In women, oestrogen-induced telangiectases often disappear within 6 months after having a baby or of stopping the combined contraceptive pill. They can also reappear after initially successful treatment.     DERMATOFIBROMA    Assessment and Plan:  Based on a thorough discussion of this condition and the management approach to it (including a comprehensive discussion of the known risks, side effects and potential benefits of treatment), the patient (family) agrees to implement the following specific plan:   Pt reassured of benign diagnosis, no treatment required.    Pt aware that if lesions ever have concerning changes such as rapid enlargement, start to become non healing areas or frequently traumatized, please call to let us know as these changes may require biopsy or we can discuss more definitve removal     DERMATOFIBROMA, PATIENT INFORMATION:  A dermatofibroma is a common benign fibrous nodule that most often arises on the skin of the lower legs.  A dermatofibroma is also called a \"cutaneous fibrous histiocytoma.\"  Dermatofibromas occur at all ages and in people of every ethnicity. They are more common in women than in men.    It is not clear if dermatofibroma is a reactive process or if it is a neoplasm. The lesions are made up of proliferating fibroblasts. Histiocytes may also be involved.  They are sometimes attributed to an insect bite or " "ingrownhair or local trauma, but not consistently. They may be more numerous in patients with altered immunity.    Dermatofibromas most often occur on the legs and arms, but may also arise on the trunk or any site of the body.  Typical clinical features include the following:  People may have 1 or up to 15 lesions.  Size varies from 0.5-1.5 cm diameter; most lesions are 7-10 mm diameter.  They are firm nodules tethered to the skin surface and mobile over subcutaneous tissue.  The skin \"dimples\" on pinching the lesion.  Color may be pink to light brown in white skin, and dark brown to black in dark skin; some appear paler in the center.  They do not usually cause symptoms, but they are sometimes painful or itchy.  Because they are often raised lesions, they may be traumatized, for example by a razor.  Occasionally dozens may erupt within a few months, usually in the setting of immunosuppression (for example autoimmune disease, cancer or certain medications).  Dermatofibroma does not give rise to cancer. However, occasionally, it may be mistaken for dermatofibrosarcoma or desmoplastic melanoma.    A dermatofibroma is harmless and seldom causes any symptoms. Usually, only reassurance is needed. If it is nuisance or causing concern, the lesion can be removed surgically, resulting in a scar that is, by definition, usually longer in diameter than the widest portion of the dermatofibroma.  Cryotherapy, shave biopsy and laser surgery are rarely completely successful.  Skin punch biopsy or incisional biopsy may be undertaken if there is an atypical feature such as recent enlargement, ulceration, or asymmetrical structures and colours on dermatoscopy.     INFORMED CONSENT DISCUSSION AND POST-OPERATIVE INSTRUCTIONS FOR PATIENT    I.  RATIONALE FOR PROCEDURE  I understand that a skin biopsy allows the Dermatologist to test a lesion or rash under the microscope to obtain a diagnosis.  It usually involves numbing the area with " "numbing medication and removing a small piece of skin; sometimes the area will be closed with sutures. In this specific procedure, sutures are not usually needed.  If any sutures are placed, then they are usually need to be removed in 2 weeks or less.    I understand that my Dermatologist recommends that a skin \"shave\" biopsy be performed today.  A local anesthetic, similar to the kind that a dentist uses when filling a cavity, will be injected with a very small needle into the skin area to be sampled.  The injected skin and tissue underneath \"will go to sleep” and become numb so no pain should be felt afterwards.  An instrument shaped like a tiny \"razor blade\" (shave biopsy instrument) will be used to cut a small piece of tissue and skin from the area so that a sample of tissue can be taken and examined more closely under the microscope.  A slight amount of bleeding will occur, but it will be stopped with direct pressure and a pressure bandage and any other appropriate methods.  I understands that a scar will form where the wound was created.  Surgical ointment will be applied to help protect the wound.  Sutures are not usually needed.    II.  RISKS AND POTENTIAL COMPLICATIONS   I understand the risks and potential complications of a skin biopsy include but are not limited to the following:  Bleeding  Infection  Pain  Scar/keloid  Skin discoloration  Incomplete Removal  Recurrence  Nerve Damage/Numbness/Loss of Function  Allergic Reaction to Anesthesia  Biopsies are diagnostic procedures and based on findings additional treatment or evaluation may be required  Loss or destruction of specimen resulting in no additional findings    My Dermatologist has explained to me the nature of the condition, the nature of the procedure, and the benefits to be reasonably expected compared with alternative approaches.  My Dermatologist has discussed the likelihood of major risks or complications of this procedure including the " "specific risks listed above, such as bleeding, infection, and scarring/keloid.  I understand that a scar is expected after this procedure.  I understand that my physician cannot predict if the scar will form a \"keloid,\" which extends beyond the borders of the wound that is created.  A keloid is a thick, painful, and bumpy scar.  A keloid can be difficult to treat, as it does not always respond well to therapy, which includes injecting cortisone directly into the keloid every few weeks.  While this usually reduces the pain and size of the scar, it does not eliminate it.      I understand that photographs may be taken before and after the procedure.  These will be maintained as part of the medical providers confidential records and may not be made available to me.  I further authorize the medical provider to use the photographs for teaching purposes or to illustrate scientific papers, books, or lectures if in his/her judgment, medical research, education, or science may benefit from its use.    I have had an opportunity to fully inquire about the risks and benefits of this procedure and its alternatives.   I have been given ample time and opportunity to ask questions and to seek a second opinion if I wished to do so.  I acknowledge that there have specifically been no guarantees as to the cosmetic results from the procedure.  I am aware that with any procedure there is always the possibility of an unexpected complication.    III. POST-PROCEDURAL CARE (WHAT YOU WILL NEED TO DO \"AFTER THE BIOPSY\" TO OPTIMIZE HEALING)    Keep the area clean and dry.  Try NOT to remove the bandage or get it wet for the first 24 hours.    Gently clean the area and apply surgical ointment (such as Vaseline petrolatum ointment, which is available \"over the counter\" and not a prescription) to the biopsy site for up to 2 weeks straight.  This acts to protect the wound from the outside world.      Sutures are not usually placed in this " procedure.  If any sutures were placed, return for suture removal as instructed (generally 1 week for the face, 2 weeks for the body).      Take Acetaminophen (Tylenol) for discomfort, if no contraindications.  Ibuprofen or aspirin could make bleeding worse.    Call our office immediately for signs of infection: fever, chills, increased redness, warmth, tenderness, discomfort/pain, or pus or foul smell coming from the wound.    WHAT TO DO IF THERE IS ANY BLEEDING?  If a small amount of bleeding is noticed, place a clean cloth over the area and apply firm pressure for ten minutes.  Check the wound after 10 minutes of direct pressure.  If bleeding persists, try one more time for an additional 10 minutes of direct pressure on the area.  If the bleeding becomes heavier or does not stop after the second attempt, or if you have any other questions about this procedure, then please call your [facility]'s Dermatologist by calling [phone] (SKIN).     I hereby acknowledge that I have reviewed and verified the site with my Dermatologist and have requested and authorized my Dermatologist to proceed with the procedure.

## 2025-01-09 NOTE — PROGRESS NOTES
"[facility] Dermatology Clinic Note     Patient Name: Gary J Kocher  Encounter Date: 1/9/25     Have you been cared for by a [facility] Dermatologist in the last 3 years and, if so, which description applies to you?    Yes.  I have been here within the last 3 years, and my medical history has NOT changed since that time.  I am MALE/not capable of bearing children.    REVIEW OF SYSTEMS:  Have you recently had or currently have any of the following? No changes in my recent health.   PAST MEDICAL HISTORY:  Have you personally ever had or currently have any of the following?  If \"YES,\" then please provide more detail. No changes in my medical history.   HISTORY OF IMMUNOSUPPRESSION: Do you have a history of any of the following:  Systemic Immunosuppression such as Diabetes, Biologic or Immunotherapy, Chemotherapy, Organ Transplantation, Bone Marrow Transplantation or Prednisone?  No     Answering \"YES\" requires the addition of the dotphrase \"IMMUNOSUPPRESSED\" as the first diagnosis of the patient's visit.   FAMILY HISTORY:  Any \"first degree relatives\" (parent, brother, sister, or child) with the following?    No changes in my family's known health.   PATIENT EXPERIENCE:    Do you want the Dermatologist to perform a COMPLETE skin exam today including a clinical examination under the \"bra and underwear\" areas?  Yes  If necessary, do we have your permission to call and leave a detailed message on your Preferred Phone number that includes your specific medical information?  Yes      Allergies   Allergen Reactions    Gold-Containing Drug Products Rash     1+ POSITIVE PATCH TEST    Parabens Rash     POSITIVE 1+ PATCH TEST      Current Outpatient Medications:     aspirin (ECOTRIN LOW STRENGTH) 81 mg EC tablet, Take 81 mg by mouth, Disp: , Rfl:     atorvastatin (LIPITOR) 10 mg tablet, Take 10 mg by mouth daily  , Disp: , Rfl:     Cholecalciferol (VITAMIN D3) 2000 units capsule, Take 1 capsule by mouth daily, Disp: , Rfl:     " digoxin (LANOXIN) 0.125 mg tablet, Take 1 tablet (125 mcg total) by mouth daily, Disp: 90 tablet, Rfl: 3    hydrocortisone 2.5 % ointment, Apply topically 2 (two) times a day To affected area.  Avoid face, groin, armpits, Disp: 28.35 g, Rfl: 2    hydrocortisone 2.5 % ointment, Apply topically 2 (two) times a day To affected area. Avoid face, groin, armpits, Disp: 453 g, Rfl: 1    nitroglycerin (NITROSTAT) 0.4 mg SL tablet, , Disp: , Rfl: 2    tamsulosin (FLOMAX) 0.4 mg, Take 2 capsules (0.8 mg total) by mouth daily with dinner, Disp: 180 capsule, Rfl: 3    desoximetasone (TOPICORT) 0.25 % ointment, Apply topically 2 (two) times a day (Patient not taking: Reported on 5/13/2024), Disp: 60 g, Rfl: 1    guaiFENesin (MUCINEX) 600 mg 12 hr tablet, Take 2 tablets (1,200 mg total) by mouth every 12 (twelve) hours (Patient not taking: Reported on 11/12/2024), Disp: 30 tablet, Rfl: 0    oxybutynin (DITROPAN) 5 mg tablet, Take 1 tablet (5 mg total) by mouth 3 (three) times a day (Patient not taking: Reported on 9/17/2024), Disp: 15 tablet, Rfl: 0          Whom besides the patient is providing clinical information about today's encounter?   NO ADDITIONAL HISTORIAN (patient alone provided history)    Physical Exam and Assessment/Plan by Diagnosis:    SEBORRHEIC KERATOSES  - Relevant exam: Scattered over the trunk/extremities are waxy brown to black plaques and papules with stuck on appearance  - Exam and clinical history consistent with seborrheic keratoses  - Counseled that these are benign growths that do not require treatment  - Counseled that removal of lesions is considered cosmetic and so would incur a fee should patient elect to move forward.   - Patient to hold on treatments for now but will inform us should they desire additional treatments    MELANOCYTIC NEVI  -Relevant exam: Scattered over the trunk/extremities are homogenously pigmented brown macules and papules. ELM performed and without concerning findings.  - Exam  and clinical history consistent with melanocytic nevi  - Educated on the ABCDE's of melanoma; handout provided  - Counseled to return to clinic prior to scheduled appointment should any of these lesions change or should any new lesions of concern arise  - Counseled on use of sun protection daily. Reviewed latest FDA sunscreen guidelines, including use of broad spectrum (UVA and UVB blocking) sunscreen or sun protective clothing with SPF 30-50 every 2-3 hours and reapplied after exposure to water; use of photoprotective clothing, including a broad brim hat and UPF rated clothing if outdoors for several hours; avoid use of tanning beds as these pose significant risk for melanoma and skin cancer.    LENTIGINES  OTHER SKIN CHANGES DUE TO CHRONIC EXPOSURE TO NONIONIZING RADIATION  - Relevant exam: Over sun exposed areas are brown macules. ELM performed and without concerning findings.  - Exam and clinical history consistent with lentigines.  - Educated that these are indicative of prior sun exposure.   - Counseled to return to clinic prior to scheduled appointment should any of these lesions change or should any new lesions of concern arise.  - Recommended use of sunscreen as above and below.  - Counseled on use of sun protection daily. Reviewed latest FDA sunscreen guidelines, including use of broad spectrum (UVA and UVB blocking) sunscreen or sun protective clothing with SPF 30-50 every 2-3 hours and reapplied after exposure to water; use of photoprotective clothing, including a broad brim hat and UPF rated clothing if outdoors for several hours; avoid use of tanning beds as these pose significant risk for melanoma and skin cancer.    CHERRY ANGIOMAS  - Relevant exam: Scattered over the trunk/extremities are red papules  - Exam and clinical history consistent with cherry angiomas  - Educated that these are benign  - Educated that removal is considered aesthetic and would incur a fee.  - Patient does not wish to pursue  "removal at this time but will contact us should this change.    SPIDER TELANGIECTASIS (\"SPIDER ANGIOMA\")    Physical Exam:  Anatomic Location Affected:  supratip of the nose  Morphological Description:  branched reddish macules   Pertinent Positives:  Pertinent Negatives:    Additional History of Present Condition:  Present on exam    Assessment and Plan:  Based on a thorough discussion of this condition and the management approach to it (including a comprehensive discussion of the known risks, side effects and potential benefits of treatment), the patient (family) agrees to implement the following specific plan:  Reassured benign  Referred to [facility] for cosmetic laser    Spider Telangiectasis  A spider telangiectasis (plural: telangiectases) is composed of dilated blood vessels, and is clinically characterised by its spider-like appearance. It is given that name because it has a central red papule (the body of the 'spider') from which fine red lines (the spider legs) extend radially. An alternative explanation for the name explains that the red lines form a spider-like network. Other names for spider telangiectasis are spider angioma (a misnomer), spider naevus and naevus araneus.      A solitary spider telangiectasis is common in children and adults, affecting 10-15% of the population. Although they can affect people of any race, spider telangiectases are more easily seen in fair skin compared to skin of colour. Multiple spider telangiectases arise most frequently in pregnancy, in women taking a combined oral contraceptive pill, in patients with liver disease (particularly, in cirrhosis due to alcohol abuse), and in those with thyrotoxicosis.  What causes a spider telangiectasis?    Spider telangiectasis is an acquired vascular malformation. It occurs because of the failure of a tiny muscle restricting the size of an arteriole. Increased pulsating flow through the vessel (the central papule) results in the " dilatation of distal vessels (the red lines).  Spider telangiectasis may arise spontaneously or may be induced by circulating oestrogen, which is increased in pregnancy, women taking combined oral contraceptives, and in those with liver disease. Various vascular endothelial growth factors may be involved.    Spider telangiectases are often located on the face, neck, and upper chest (this has been postulated to relate to the distribution of a large vein draining the heart, the superior vena cava). Spider telangiectases may also occur on the hands, arms, or other sites. Spider telangiectases vary in size and number, tending to be larger and more numerous in people with severe liver disease when other cutaneous signs of liver disease may be present such as palmar erythema, leukonychia, and jaundice. A central dilated arteriole may be present without radial capillaries, and the capillaries may vary in diameter, length, and number. They can also be star-shaped.  The lesions may briefly bleed on trauma but otherwise do not cause any symptoms or complications.     A spider telangiectasis is diagnosed by its typical appearance. Compression of the central arteriole results in the disappearance of the radial capillaries, which rapidly refill when the compression is relieved. This is best seen through a transparent object, such as a glass slide or the lens of a contact dermatoscope.     Spider telangiectases are distinct from 'spider veins', which are blue-coloured dilated venules arising on the thighs and lower legs and often associated with varicose veins.    What is the treatment for a spider telangiectasis?  A spider telangiectasis is harmless and does not require treatment. A lesion that is unsightly can be removed by destroying the feeding central arteriole, but this may result in a small scar. Treatments to remove spider telangiectasis include:  Cryotherapy  Electrocautery  Intense pulsed light  Vascular  "laser.  Surgical excision is rarely necessary and inevitably leaves a scar.    Spider telangiectases can persist or disappear. In women, oestrogen-induced telangiectases often disappear within 6 months after having a baby or of stopping the combined contraceptive pill. They can also reappear after initially successful treatment.     DERMATOFIBROMA    Physical Exam:  Anatomic Location Affected & Description: tan to brown firm papule/s, + dimple sign with lateral pressure on the right thigh  Pertinent Positives:  Pertinent Negatives:    Additional History of Present Condition:  Present on exam    Assessment and Plan:  Based on a thorough discussion of this condition and the management approach to it (including a comprehensive discussion of the known risks, side effects and potential benefits of treatment), the patient (family) agrees to implement the following specific plan:   Pt reassured of benign diagnosis, no treatment required.    Pt aware that if lesions ever have concerning changes such as rapid enlargement, start to become non healing areas or frequently traumatized, please call to let us know as these changes may require biopsy or we can discuss more definitve removal     DERMATOFIBROMA, PATIENT INFORMATION:  A dermatofibroma is a common benign fibrous nodule that most often arises on the skin of the lower legs.  A dermatofibroma is also called a \"cutaneous fibrous histiocytoma.\"  Dermatofibromas occur at all ages and in people of every ethnicity. They are more common in women than in men.    It is not clear if dermatofibroma is a reactive process or if it is a neoplasm. The lesions are made up of proliferating fibroblasts. Histiocytes may also be involved.  They are sometimes attributed to an insect bite or ingrownhair or local trauma, but not consistently. They may be more numerous in patients with altered immunity.    Dermatofibromas most often occur on the legs and arms, but may also arise on the trunk or " "any site of the body.  Typical clinical features include the following:  People may have 1 or up to 15 lesions.  Size varies from 0.5-1.5 cm diameter; most lesions are 7-10 mm diameter.  They are firm nodules tethered to the skin surface and mobile over subcutaneous tissue.  The skin \"dimples\" on pinching the lesion.  Color may be pink to light brown in white skin, and dark brown to black in dark skin; some appear paler in the center.  They do not usually cause symptoms, but they are sometimes painful or itchy.  Because they are often raised lesions, they may be traumatized, for example by a razor.  Occasionally dozens may erupt within a few months, usually in the setting of immunosuppression (for example autoimmune disease, cancer or certain medications).  Dermatofibroma does not give rise to cancer. However, occasionally, it may be mistaken for dermatofibrosarcoma or desmoplastic melanoma.    A dermatofibroma is harmless and seldom causes any symptoms. Usually, only reassurance is needed. If it is nuisance or causing concern, the lesion can be removed surgically, resulting in a scar that is, by definition, usually longer in diameter than the widest portion of the dermatofibroma.  Cryotherapy, shave biopsy and laser surgery are rarely completely successful.  Skin punch biopsy or incisional biopsy may be undertaken if there is an atypical feature such as recent enlargement, ulceration, or asymmetrical structures and colours on dermatoscopy.     NEOPLASM OF UNCERTAIN BEHAVIOR OF SKIN    Physical Exam:  (Anatomic Location); (Size and Morphological Description); (Differential Diagnosis):  Specimen A; mid lower back; 0.6 cm thin pink papule ddx nevi vs blk vs sccis  Pertinent Positives:  Pertinent Negatives:    Additional History of Present Condition:  Present on exam    Assessment and Plan:  I have discussed with the patient that a sample of skin via a \"skin biopsy” would be potentially helpful to further make a " specific diagnosis under the microscope.  Based on a thorough discussion of this condition and the management approach to it (including a comprehensive discussion of the known risks, side effects and potential benefits of treatment), the patient (family) agrees to implement the following specific plan:    Procedure:  Skin Biopsy.  After a thorough discussion of treatment options and risk/benefits/alternatives (including but not limited to local pain, scarring, dyspigmentation, blistering, possible superinfection, and inability to confirm a diagnosis via histopathology), verbal and written consent were obtained and portion of the rash was biopsied for tissue sample.  See below for consent that was obtained from patient and subsequent Procedure Note.     PROCEDURE TANGENTIAL (SHAVE) BIOPSY NOTE:    Performing Physician:  Dr. Figueroa  Anatomic Location; Clinical Description with size (cm); Pre-Op Diagnosis:   Specimen A; mid lower back; 0.6 cm thin pink papule ddx nevi vs blk vs sccis  Post-op diagnosis: Same     Local anesthesia: 1% xylocaine with epi      Topical anesthesia: None    Hemostasis: Aluminum chloride       After obtaining informed consent  at which time there was a discussion about the purpose of biopsy  and low risks of infection and bleeding.  The area was prepped and draped in the usual fashion. Anesthesia was obtained with 1% lidocaine with epinephrine. A shave biopsy to an appropriate sampling depth was obtained by Shave (Dermablade or 15 blade) The resulting wound was covered with surgical ointment and bandaged appropriately.     The patient tolerated the procedure well without complications and was without signs of functional compromise.      Specimen has been sent for review by Dermatopathology.    Standard post-procedure care has been explained and has been included in written form within the patient's copy of Informed Consent.    INFORMED CONSENT DISCUSSION AND POST-OPERATIVE INSTRUCTIONS FOR  "PATIENT    I.  RATIONALE FOR PROCEDURE  I understand that a skin biopsy allows the Dermatologist to test a lesion or rash under the microscope to obtain a diagnosis.  It usually involves numbing the area with numbing medication and removing a small piece of skin; sometimes the area will be closed with sutures. In this specific procedure, sutures are not usually needed.  If any sutures are placed, then they are usually need to be removed in 2 weeks or less.    I understand that my Dermatologist recommends that a skin \"shave\" biopsy be performed today.  A local anesthetic, similar to the kind that a dentist uses when filling a cavity, will be injected with a very small needle into the skin area to be sampled.  The injected skin and tissue underneath \"will go to sleep” and become numb so no pain should be felt afterwards.  An instrument shaped like a tiny \"razor blade\" (shave biopsy instrument) will be used to cut a small piece of tissue and skin from the area so that a sample of tissue can be taken and examined more closely under the microscope.  A slight amount of bleeding will occur, but it will be stopped with direct pressure and a pressure bandage and any other appropriate methods.  I understands that a scar will form where the wound was created.  Surgical ointment will be applied to help protect the wound.  Sutures are not usually needed.    II.  RISKS AND POTENTIAL COMPLICATIONS   I understand the risks and potential complications of a skin biopsy include but are not limited to the following:  Bleeding  Infection  Pain  Scar/keloid  Skin discoloration  Incomplete Removal  Recurrence  Nerve Damage/Numbness/Loss of Function  Allergic Reaction to Anesthesia  Biopsies are diagnostic procedures and based on findings additional treatment or evaluation may be required  Loss or destruction of specimen resulting in no additional findings    My Dermatologist has explained to me the nature of the condition, the nature of " "the procedure, and the benefits to be reasonably expected compared with alternative approaches.  My Dermatologist has discussed the likelihood of major risks or complications of this procedure including the specific risks listed above, such as bleeding, infection, and scarring/keloid.  I understand that a scar is expected after this procedure.  I understand that my physician cannot predict if the scar will form a \"keloid,\" which extends beyond the borders of the wound that is created.  A keloid is a thick, painful, and bumpy scar.  A keloid can be difficult to treat, as it does not always respond well to therapy, which includes injecting cortisone directly into the keloid every few weeks.  While this usually reduces the pain and size of the scar, it does not eliminate it.      I understand that photographs may be taken before and after the procedure.  These will be maintained as part of the medical providers confidential records and may not be made available to me.  I further authorize the medical provider to use the photographs for teaching purposes or to illustrate scientific papers, books, or lectures if in his/her judgment, medical research, education, or science may benefit from its use.    I have had an opportunity to fully inquire about the risks and benefits of this procedure and its alternatives.   I have been given ample time and opportunity to ask questions and to seek a second opinion if I wished to do so.  I acknowledge that there have specifically been no guarantees as to the cosmetic results from the procedure.  I am aware that with any procedure there is always the possibility of an unexpected complication.    III. POST-PROCEDURAL CARE (WHAT YOU WILL NEED TO DO \"AFTER THE BIOPSY\" TO OPTIMIZE HEALING)    Keep the area clean and dry.  Try NOT to remove the bandage or get it wet for the first 24 hours.    Gently clean the area and apply surgical ointment (such as Vaseline petrolatum ointment, which is " "available \"over the counter\" and not a prescription) to the biopsy site for up to 2 weeks straight.  This acts to protect the wound from the outside world.      Sutures are not usually placed in this procedure.  If any sutures were placed, return for suture removal as instructed (generally 1 week for the face, 2 weeks for the body).      Take Acetaminophen (Tylenol) for discomfort, if no contraindications.  Ibuprofen or aspirin could make bleeding worse.    Call our office immediately for signs of infection: fever, chills, increased redness, warmth, tenderness, discomfort/pain, or pus or foul smell coming from the wound.    WHAT TO DO IF THERE IS ANY BLEEDING?  If a small amount of bleeding is noticed, place a clean cloth over the area and apply firm pressure for ten minutes.  Check the wound after 10 minutes of direct pressure.  If bleeding persists, try one more time for an additional 10 minutes of direct pressure on the area.  If the bleeding becomes heavier or does not stop after the second attempt, or if you have any other questions about this procedure, then please call your [facility] Dermatologist by calling [phone] (SKIN).     I hereby acknowledge that I have reviewed and verified the site with my Dermatologist and have requested and authorized my Dermatologist to proceed with the procedure.      Scribe Attestation      I,:  Renay Valiente am acting as a scribe while in the presence of the attending physician.:       I,:  Manav Figueroa MD personally performed the services described in this documentation    as scribed in my presence.:            "

## 2025-01-14 PROCEDURE — 88342 IMHCHEM/IMCYTCHM 1ST ANTB: CPT | Performed by: STUDENT IN AN ORGANIZED HEALTH CARE EDUCATION/TRAINING PROGRAM

## 2025-01-14 PROCEDURE — 88341 IMHCHEM/IMCYTCHM EA ADD ANTB: CPT | Performed by: STUDENT IN AN ORGANIZED HEALTH CARE EDUCATION/TRAINING PROGRAM

## 2025-01-14 PROCEDURE — 88305 TISSUE EXAM BY PATHOLOGIST: CPT | Performed by: STUDENT IN AN ORGANIZED HEALTH CARE EDUCATION/TRAINING PROGRAM

## 2025-01-15 ENCOUNTER — RESULTS FOLLOW-UP (OUTPATIENT)
Age: 80
End: 2025-01-15

## 2025-01-15 NOTE — RESULT ENCOUNTER NOTE
DERMATOPATHOLOGY RESULT NOTE    Results reviewed by ordering physician.  Called patient to personally discuss results. No answer, left voicemail with result.      Instructions for Clinical Derm Team:   (remember to route Result Note to appropriate staff):    None    Result & Plan by Specimen:    Specimen A: benign  Plan: reassured, benign        A. Skin, mid lower back, shave biopsy:    SEBORRHEIC KERATOSIS, irritated and inflamed (see note).    Note: SOX10, p16, and p40 immunostains were reviewed and support the diagnosis. If the lesion were to progress or persist, additional sampling should be sought.        Electronically signed by Linda Vergara MD on 1/14/2025 at 1623 EST

## 2025-02-11 DIAGNOSIS — E78.2 MIXED HYPERLIPIDEMIA: Primary | ICD-10-CM

## 2025-02-11 NOTE — TELEPHONE ENCOUNTER
Pt contacted Call Center requested refill of their medication.        Doctor Name: Dr. Santana      Medication Name: atorvastatin      Dosage of Med: 10 mg      Frequency of Med: 1 tablet by mouth daily      Remaining Medication: 2 weeks' worth      Pharmacy and Location: [facility]        Pt. Preferred Callback Phone Number: [phone]      Thank you.

## 2025-02-12 RX ORDER — ATORVASTATIN CALCIUM 10 MG/1
10 TABLET, FILM COATED ORAL DAILY
Qty: 30 TABLET | Refills: 5 | Status: SHIPPED | OUTPATIENT
Start: 2025-02-12 | End: 2025-02-20 | Stop reason: SDUPTHER

## 2025-02-18 DIAGNOSIS — Z23 ENCOUNTER FOR IMMUNIZATION: ICD-10-CM

## 2025-02-18 DIAGNOSIS — E03.8 SUBCLINICAL HYPOTHYROIDISM: Chronic | ICD-10-CM

## 2025-02-18 NOTE — TELEPHONE ENCOUNTER
Pt contacted Call Center requested refill of their medication.      Pt needs medication to be requested under Dr. Santana name.     Doctor Name: Dr. Irizarry       Medication Name: digoxin (LANOXIN)       Dosage of Med: 0.125 MG       Frequency of Med:  Take 1 tablet (125 mcg total) by mouth daily,       Remaining Medication: 1 week left worth of medication       Pharmacy and Location: [facility] - [address]         Pt. Preferred Callback Phone Number: [phone]      Thank you.       PLEASE ADVISE PATIENTS:    REFILL REQUESTS WILL BE PROCESSED WITHIN 24-48 HOURS.

## 2025-02-19 RX ORDER — DIGOXIN 125 MCG
125 TABLET ORAL DAILY
Qty: 90 TABLET | Refills: 3 | Status: SHIPPED | OUTPATIENT
Start: 2025-02-19

## 2025-02-20 ENCOUNTER — TELEPHONE (OUTPATIENT)
Age: 80
End: 2025-02-20

## 2025-02-20 DIAGNOSIS — E78.2 MIXED HYPERLIPIDEMIA: ICD-10-CM

## 2025-02-20 RX ORDER — ATORVASTATIN CALCIUM 10 MG/1
10 TABLET, FILM COATED ORAL DAILY
Qty: 90 TABLET | Refills: 1 | Status: SHIPPED | OUTPATIENT
Start: 2025-02-20 | End: 2025-02-20 | Stop reason: SDUPTHER

## 2025-02-20 RX ORDER — ATORVASTATIN CALCIUM 10 MG/1
10 TABLET, FILM COATED ORAL DAILY
Qty: 90 TABLET | Refills: 2 | Status: SHIPPED | OUTPATIENT
Start: 2025-02-20

## 2025-02-20 NOTE — TELEPHONE ENCOUNTER
Pt called states a 30 day supply of atorvastatin [facility] and it shouldve been a 90 day. I explained to pt that I was sorry and I would correct it. And fixed it and resent rx for the 90 day.

## 2025-02-20 NOTE — TELEPHONE ENCOUNTER
Caller: Gary Kocher    Doctor: Dr. Santana    Reason for call: Patient got refill of atorvastatin for 30 days. He states he should get a 90 day refill [facility]. Can this be corrected to pt gets 90 day supply each time? Please reach out to pt    Call back#: [phone]

## 2025-03-25 ENCOUNTER — OFFICE VISIT (OUTPATIENT)
Dept: FAMILY MEDICINE CLINIC | Facility: CLINIC | Age: 80
End: 2025-03-25
Payer: MEDICARE

## 2025-03-25 VITALS
OXYGEN SATURATION: 93 % | WEIGHT: 142.8 LBS | DIASTOLIC BLOOD PRESSURE: 70 MMHG | HEART RATE: 64 BPM | TEMPERATURE: 97.9 F | BODY MASS INDEX: 21.15 KG/M2 | SYSTOLIC BLOOD PRESSURE: 132 MMHG | HEIGHT: 69 IN

## 2025-03-25 DIAGNOSIS — C61 PROSTATE CANCER (HCC): ICD-10-CM

## 2025-03-25 DIAGNOSIS — I25.10 CHRONIC CORONARY ARTERY DISEASE: Primary | ICD-10-CM

## 2025-03-25 DIAGNOSIS — E78.5 HYPERLIPIDEMIA, UNSPECIFIED HYPERLIPIDEMIA TYPE: ICD-10-CM

## 2025-03-25 DIAGNOSIS — I47.19 PAROXYSMAL ATRIAL TACHYCARDIA (HCC): ICD-10-CM

## 2025-03-25 DIAGNOSIS — I47.29 NON-SUSTAINED VENTRICULAR TACHYCARDIA (HCC): ICD-10-CM

## 2025-03-25 DIAGNOSIS — E03.8 SUBCLINICAL HYPOTHYROIDISM: Chronic | ICD-10-CM

## 2025-03-25 DIAGNOSIS — J84.9 ILD (INTERSTITIAL LUNG DISEASE) (HCC): ICD-10-CM

## 2025-03-25 PROCEDURE — G2211 COMPLEX E/M VISIT ADD ON: HCPCS | Performed by: FAMILY MEDICINE

## 2025-03-25 PROCEDURE — 99214 OFFICE O/P EST MOD 30 MIN: CPT | Performed by: FAMILY MEDICINE

## 2025-03-25 NOTE — ASSESSMENT & PLAN NOTE
Lab Results   Component Value Date    [id number] 4.160 (H) 12/23/2019    TSH 11.49 (H) 09/03/2024     Stable  Asymptomatic  Continue to monitor

## 2025-03-25 NOTE — ASSESSMENT & PLAN NOTE
Worsening  Patient reports wheezing  There is right lower lung wheezes on exam  Hx of multiple lung nodules  Due for annual CT chest high resolution    Orders:  •  CT chest high resolution; Future

## 2025-03-25 NOTE — PROGRESS NOTES
Name: Gary J Kocher      : 1945      MRN: [id number]  Encounter Provider: Tj Lara DO  Encounter Date: 3/25/2025   Encounter department: [facility]  :  Assessment & Plan  Chronic coronary artery disease  Stable and asymptomatic  Follows with cardio, Dr. Santana  Continue atorvastatin 10mg, asa 81mg       Paroxysmal atrial tachycardia (HCC)  Stable  Remaisn in nsr  No longer on anticoagulation  Rhythm controlled on dijoxin 125mcg   Due for digoxin level       Subclinical hypothyroidism  Lab Results   Component Value Date    [id number] 4.160 (H) 2019    TSH 11.49 (H) 2024     Stable  Asymptomatic  Continue to monitor       Hyperlipidemia, unspecified hyperlipidemia type  Continue atorvastatin 10mg       ILD (interstitial lung disease) (HCC)  Worsening  Patient reports wheezing  There is right lower lung wheezes on exam  Hx of multiple lung nodules  Due for annual CT chest high resolution    Orders:  •  CT chest high resolution; Future    Non-sustained ventricular tachycardia (HCC)         Prostate cancer (HCC)  Stable  No new LUTS                History of Present Illness   HPI presents today for routine follow up. Has no concerns.   Review of Systems   Constitutional:  Negative for activity change, chills, diaphoresis and fever.   HENT:  Negative for ear pain, hearing loss, postnasal drip, rhinorrhea, sinus pressure, sinus pain, sneezing and sore throat.    Respiratory:  Positive for wheezing. Negative for cough, chest tightness and shortness of breath.    Cardiovascular:  Negative for chest pain, palpitations and leg swelling.   Gastrointestinal:  Negative for abdominal pain, blood in stool, constipation, diarrhea, nausea and vomiting.   Genitourinary:  Negative for dysuria, frequency, hematuria and urgency.   Musculoskeletal:  Negative for arthralgias and myalgias.   Neurological:  Negative for dizziness, syncope, weakness, light-headedness,  "numbness and headaches.       Objective   /70 (BP Location: Left arm, Patient Position: Sitting, Cuff Size: Adult)   Pulse 64   Temp 97.9 °F (36.6 °C) (Temporal)   Ht 5' 9\" (1.753 m)   Wt 64.8 kg (142 lb 12.8 oz)   SpO2 93%   BMI 21.09 kg/m²      Physical Exam  Vitals reviewed.   Constitutional:       General: He is not in acute distress.     Appearance: He is well-developed. He is not diaphoretic.   HENT:      Head: Normocephalic and atraumatic.      Right Ear: Tympanic membrane, ear canal and external ear normal. There is no impacted cerumen.      Left Ear: Tympanic membrane, ear canal and external ear normal. There is no impacted cerumen.      Nose: Nose normal. No congestion.      Mouth/Throat:      Mouth: Mucous membranes are moist.      Pharynx: Oropharynx is clear.   Eyes:      General: No scleral icterus.        Right eye: No discharge.         Left eye: No discharge.      Conjunctiva/sclera: Conjunctivae normal.      Pupils: Pupils are equal, round, and reactive to light.   Neck:      Vascular: No JVD.   Cardiovascular:      Rate and Rhythm: Normal rate and regular rhythm.      Heart sounds: Normal heart sounds. No murmur heard.     No friction rub.   Pulmonary:      Effort: Pulmonary effort is normal. No respiratory distress.      Breath sounds: Normal breath sounds. No wheezing or rales.       Chest:      Chest wall: No tenderness.   Abdominal:      General: Bowel sounds are normal. There is no distension.      Palpations: Abdomen is soft. There is no mass.      Tenderness: There is no abdominal tenderness. There is no guarding or rebound.   Musculoskeletal:         General: No tenderness or deformity. Normal range of motion.      Cervical back: Normal range of motion and neck supple.   Skin:     General: Skin is warm and dry.      Findings: No erythema or rash.   Neurological:      Mental Status: He is alert and oriented to person, place, and time. Mental status is at baseline.      Cranial " Nerves: No cranial nerve deficit.   Psychiatric:         Mood and Affect: Mood normal.

## 2025-03-25 NOTE — ASSESSMENT & PLAN NOTE
Stable and asymptomatic  Follows with cardio, Dr. Santana  Continue atorvastatin 10mg, asa 81mg

## 2025-03-25 NOTE — ASSESSMENT & PLAN NOTE
Stable  Remaisn in nsr  No longer on anticoagulation  Rhythm controlled on dijoxin 125mcg   Due for digoxin level

## 2025-03-29 LAB
25(OH)D3 SERPL-MCNC: 54 NG/ML (ref 30–100)
ALBUMIN SERPL-MCNC: 4.1 G/DL (ref 3.6–5.1)
ALBUMIN/GLOB SERPL: 1.7 (CALC) (ref 1–2.5)
ALP SERPL-CCNC: 54 U/L (ref 35–144)
ALT SERPL-CCNC: 29 U/L (ref 9–46)
AST SERPL-CCNC: 24 U/L (ref 10–35)
BILIRUB SERPL-MCNC: 0.7 MG/DL (ref 0.2–1.2)
BUN SERPL-MCNC: 18 MG/DL (ref 7–25)
BUN/CREAT SERPL: NORMAL (CALC) (ref 6–22)
CALCIUM SERPL-MCNC: 9.4 MG/DL (ref 8.6–10.3)
CHLORIDE SERPL-SCNC: 103 MMOL/L (ref 98–110)
CHOLEST SERPL-MCNC: 130 MG/DL
CHOLEST/HDLC SERPL: 3.3 (CALC)
CO2 SERPL-SCNC: 32 MMOL/L (ref 20–32)
CREAT SERPL-MCNC: 0.95 MG/DL (ref 0.7–1.22)
DIGOXIN SERPL-MCNC: <0.5 MCG/L (ref 0.8–2)
GFR/BSA.PRED SERPLBLD CYS-BASED-ARV: 80 ML/MIN/1.73M2
GLOBULIN SER CALC-MCNC: 2.4 G/DL (CALC) (ref 1.9–3.7)
GLUCOSE SERPL-MCNC: 98 MG/DL (ref 65–99)
HDLC SERPL-MCNC: 39 MG/DL
LDLC SERPL CALC-MCNC: 74 MG/DL (CALC)
NONHDLC SERPL-MCNC: 91 MG/DL (CALC)
POTASSIUM SERPL-SCNC: 4.1 MMOL/L (ref 3.5–5.3)
PROT SERPL-MCNC: 6.5 G/DL (ref 6.1–8.1)
PSA SERPL-MCNC: 0.11 NG/ML
SODIUM SERPL-SCNC: 142 MMOL/L (ref 135–146)
T4 FREE SERPL-MCNC: 0.9 NG/DL (ref 0.8–1.8)
TRIGL SERPL-MCNC: 86 MG/DL
TSH SERPL-ACNC: 8.3 MIU/L (ref 0.4–4.5)

## 2025-04-08 ENCOUNTER — HOSPITAL ENCOUNTER (OUTPATIENT)
Dept: RADIOLOGY | Facility: HOSPITAL | Age: 80
Discharge: HOME | End: 2025-04-08
Attending: FAMILY MEDICINE
Payer: MEDICARE

## 2025-04-08 ENCOUNTER — TELEPHONE (OUTPATIENT)
Dept: OTHER | Facility: OTHER | Age: 80
End: 2025-04-08

## 2025-04-08 ENCOUNTER — TELEPHONE (OUTPATIENT)
Age: 80
End: 2025-04-08

## 2025-04-08 DIAGNOSIS — J84.9 ILD (INTERSTITIAL LUNG DISEASE) (HCC): ICD-10-CM

## 2025-04-08 PROCEDURE — 71250 CT THORAX DX C-: CPT

## 2025-04-08 NOTE — TELEPHONE ENCOUNTER
Patient called and wanted to see if the PCP will be adding the xray.  Spoke to clerical and the patient will need to make an appt already.  Patient was already at [facility] getting a CAT Scan and was upset we could not just added.  Tried to explain the PCP needed to evaluate 1st.  He asked for the number to [facility].  I did give if that number.

## 2025-04-09 ENCOUNTER — HOSPITAL ENCOUNTER (OUTPATIENT)
Dept: RADIOLOGY | Facility: HOSPITAL | Age: 80
Discharge: HOME | End: 2025-04-09
Attending: FAMILY MEDICINE
Payer: MEDICARE

## 2025-04-09 ENCOUNTER — OFFICE VISIT (OUTPATIENT)
Dept: FAMILY MEDICINE CLINIC | Facility: CLINIC | Age: 80
End: 2025-04-09
Payer: MEDICARE

## 2025-04-09 VITALS
DIASTOLIC BLOOD PRESSURE: 60 MMHG | TEMPERATURE: 97.8 F | OXYGEN SATURATION: 100 % | HEIGHT: 69 IN | WEIGHT: 139.4 LBS | BODY MASS INDEX: 20.65 KG/M2 | SYSTOLIC BLOOD PRESSURE: 110 MMHG | HEART RATE: 78 BPM

## 2025-04-09 DIAGNOSIS — M54.50 ACUTE RIGHT-SIDED LOW BACK PAIN WITHOUT SCIATICA: ICD-10-CM

## 2025-04-09 DIAGNOSIS — M54.50 ACUTE RIGHT-SIDED LOW BACK PAIN WITHOUT SCIATICA: Primary | ICD-10-CM

## 2025-04-09 PROCEDURE — G2211 COMPLEX E/M VISIT ADD ON: HCPCS | Performed by: FAMILY MEDICINE

## 2025-04-09 PROCEDURE — 99213 OFFICE O/P EST LOW 20 MIN: CPT | Performed by: FAMILY MEDICINE

## 2025-04-09 PROCEDURE — 72110 X-RAY EXAM L-2 SPINE 4/>VWS: CPT

## 2025-04-09 RX ORDER — PREDNISONE 20 MG/1
40 TABLET ORAL DAILY
Qty: 10 TABLET | Refills: 0 | Status: SHIPPED | OUTPATIENT
Start: 2025-04-09 | End: 2025-04-09 | Stop reason: SDUPTHER

## 2025-04-09 RX ORDER — PREDNISONE 20 MG/1
40 TABLET ORAL DAILY
Qty: 10 TABLET | Refills: 0 | Status: SHIPPED | OUTPATIENT
Start: 2025-04-09 | End: 2025-04-14

## 2025-04-09 NOTE — TELEPHONE ENCOUNTER
Patient called and requested to have the prescription sent to the location below:    [facility]/pharmacy #[id number] -   [address]   Phone: [phone]  Fax: [phone]

## 2025-04-09 NOTE — PROGRESS NOTES
"Name: Gary J Kocher      : 1945      MRN: [id number]  Encounter Provider: Tj Lara DO  Encounter Date: 2025   Encounter department: [facility]  :  Assessment & Plan  Acute right-sided low back pain without sciatica  New  Slow onset  Negative slr b/l, gareth, log roll  Pain is located at right SI joint  Continue tylenol, voltaren, and heating pads  Will rule out acute compression with xr  Agreeable to go to chiropractor after xr is read  May also try prednisone burst   Orders:  •  XR spine lumbar minimum 4 views non injury; Future  •  predniSONE 20 mg tablet; Take 2 tablets (40 mg total) by mouth daily for 5 days  •  Ambulatory Referral to Chiropractic; Future           History of Present Illness   HPI NEW  Started 3 days ago  Gradual onset  Mild improvement with tylenol 500mg, topical volateren.  Worse at night  No falls or injury  Review of Systems   Constitutional:  Negative for activity change, chills, diaphoresis and fever.   HENT:  Negative for ear pain, hearing loss, postnasal drip, rhinorrhea, sinus pressure, sinus pain, sneezing and sore throat.    Respiratory:  Negative for cough, chest tightness, shortness of breath and wheezing.    Cardiovascular:  Negative for chest pain, palpitations and leg swelling.   Gastrointestinal:  Negative for abdominal pain, blood in stool, constipation, diarrhea, nausea and vomiting.   Genitourinary:  Negative for dysuria, frequency, hematuria and urgency.   Musculoskeletal:  Positive for back pain. Negative for arthralgias and myalgias.   Neurological:  Negative for dizziness, syncope, weakness, light-headedness, numbness and headaches.       Objective   /60 (BP Location: Left arm, Patient Position: Sitting, Cuff Size: Adult)   Pulse 78   Temp 97.8 °F (36.6 °C) (Temporal)   Ht 5' 9\" (1.753 m)   Wt 63.2 kg (139 lb 6.4 oz)   SpO2 100%   BMI 20.59 kg/m²      Physical Exam  Vitals reviewed. "   Constitutional:       General: He is not in acute distress.     Appearance: He is well-developed. He is not diaphoretic.   HENT:      Head: Normocephalic and atraumatic.      Right Ear: Tympanic membrane, ear canal and external ear normal. There is no impacted cerumen.      Left Ear: Tympanic membrane, ear canal and external ear normal. There is no impacted cerumen.      Nose: Nose normal. No congestion.      Mouth/Throat:      Mouth: Mucous membranes are moist.      Pharynx: Oropharynx is clear.   Eyes:      General: No scleral icterus.        Right eye: No discharge.         Left eye: No discharge.      Conjunctiva/sclera: Conjunctivae normal.      Pupils: Pupils are equal, round, and reactive to light.   Neck:      Vascular: No JVD.   Cardiovascular:      Rate and Rhythm: Normal rate and regular rhythm.      Heart sounds: Normal heart sounds. No murmur heard.     No friction rub.   Pulmonary:      Effort: Pulmonary effort is normal. No respiratory distress.      Breath sounds: Normal breath sounds. No wheezing or rales.   Chest:      Chest wall: No tenderness.   Abdominal:      General: Bowel sounds are normal. There is no distension.      Palpations: Abdomen is soft. There is no mass.      Tenderness: There is no abdominal tenderness. There is no guarding or rebound.   Musculoskeletal:         General: No tenderness or deformity. Normal range of motion.      Cervical back: Normal range of motion and neck supple.      Thoracic back: No spasms or tenderness.      Lumbar back: No lacerations, spasms, tenderness or bony tenderness. Negative right straight leg raise test and negative left straight leg raise test.   Skin:     General: Skin is warm and dry.      Findings: No erythema or rash.   Neurological:      Mental Status: He is alert and oriented to person, place, and time. Mental status is at baseline.      Cranial Nerves: No cranial nerve deficit.   Psychiatric:         Mood and Affect: Mood normal.

## 2025-04-09 NOTE — TELEPHONE ENCOUNTER
Patient is at the [facility] pharmacy in [facility] now. Kindly resend the prescription this pharmacy [facility]. Please help. Thanks

## 2025-04-09 NOTE — TELEPHONE ENCOUNTER
Pt requesting refill for the following medication; predniSONE 20 mg tablet  to be sent to [facility] on [address]

## 2025-04-11 ENCOUNTER — TELEPHONE (OUTPATIENT)
Dept: OBGYN CLINIC | Facility: HOSPITAL | Age: 80
End: 2025-04-11

## 2025-04-11 ENCOUNTER — TELEPHONE (OUTPATIENT)
Age: 80
End: 2025-04-11

## 2025-04-11 NOTE — TELEPHONE ENCOUNTER
Spoke with patient, explained the spinal manipulation codes are covered by medicare A&B with his medicare supplement but not the consultation code for the first visit. Notified patient it is still billed to his medicare and medicare supplement and he does not have to pay at the first visit, but if his insurance doesn't cover the code then he will be responsible for the the price of the consultation. Patient stated with the consultation not being covered the first visit, he would not be interested in scheduling a chiropractic appointment, and will instead look to schedule an orthopedic appointment.

## 2025-04-11 NOTE — TELEPHONE ENCOUNTER
Patient called, he was referred to a Chiropractor for his back/sciatica pain.  He was advised by the Chiropractor office that Medicare will NOT cover this.  Patient is asking what other recommendations PCP has?  Possibly a referral to an orthopedic to help with the Right side low back and sciatica pain?    Please advise.  Patients c/b# [phone]

## 2025-04-11 NOTE — TELEPHONE ENCOUNTER
PT called today regarding recent xray results for his spine. Doesn't look like the results are in but he asked if there is any way we can expedite it. Pt also said that he would like a quick phone call with Dr. Spencer regarding chiropractor not being covered and would just like to see an ortho for his back. Please advise [phone] thank you.

## 2025-04-11 NOTE — TELEPHONE ENCOUNTER
Caller: Patient    Doctor: Dr. Sanchez    Reason for call: Patient referred to [facility] and I advised how the medicare works with his plan and initial evaluation not covered and will have an up front cost. Patient wanted to speak with someone at the office. Transferred call to [place] at [facility] Office to discuss.    Call back#: [phone]

## 2025-04-17 DIAGNOSIS — N40.1 BPH WITH OBSTRUCTION/LOWER URINARY TRACT SYMPTOMS: ICD-10-CM

## 2025-04-17 DIAGNOSIS — N13.8 BPH WITH OBSTRUCTION/LOWER URINARY TRACT SYMPTOMS: ICD-10-CM

## 2025-04-17 NOTE — TELEPHONE ENCOUNTER
RX Refill     Patient is calling to request a prescription refill.      Last seen by: Ofelia at [place] 05/13/24    Medication: Tamsulosin    Pharmacy: [facility]    30 / 90 day supply: 90 days supply     Pt can be reached at: [phone]

## 2025-04-18 ENCOUNTER — OFFICE VISIT (OUTPATIENT)
Dept: OBGYN CLINIC | Facility: HOSPITAL | Age: 80
End: 2025-04-18
Payer: MEDICARE

## 2025-04-18 VITALS — HEIGHT: 69 IN | WEIGHT: 139 LBS | BODY MASS INDEX: 20.59 KG/M2

## 2025-04-18 DIAGNOSIS — M54.50 LUMBAR SPINE PAIN: ICD-10-CM

## 2025-04-18 DIAGNOSIS — M53.3 SI (SACROILIAC) PAIN: Primary | ICD-10-CM

## 2025-04-18 PROCEDURE — 99203 OFFICE O/P NEW LOW 30 MIN: CPT | Performed by: FAMILY MEDICINE

## 2025-04-18 RX ORDER — TAMSULOSIN HYDROCHLORIDE 0.4 MG/1
0.8 CAPSULE ORAL
Qty: 180 CAPSULE | Refills: 0 | Status: SHIPPED | OUTPATIENT
Start: 2025-04-18

## 2025-04-18 NOTE — PROGRESS NOTES
Assessment:     Assessment & Plan  Lumbar spine pain    Orders:    Ambulatory Referral to [facility]; Future    SI (sacroiliac) pain    Orders:    Ambulatory Referral to [facility]; Future        Diagnosis and Orders:      1. Lumbar spine pain  Ambulatory Referral to [facility]      2. SI (sacroiliac) pain  Ambulatory Referral to [facility]        Orders Placed This Encounter   Procedures    Ambulatory Referral to [facility]        Impression:   Low back pain likely multifactorial secondary to degenerative disc disease, facet arthropathy.      Conservative Management   We discussed different treatment options:  Reviewed PCP documentation completed on 04/09/2025.  Treatment plan consisted of x-rays, prednisone, referral to chiropractor  Reviewed CMP completed on 03/2025  Reviewed current epic medications.  Ice or Heat Therapy as needed 1-2 times daily for 10-20 minutes. As tolerated.   Over the counter Tylenol and/or NSAIDs  as needed based off your Past Medical Hx. Please follow product label for dosing and maximum limits.    Initiate Formal Physical Therapy at any preferred location.         Imaging   Reviewed prior xrays obtain by Primary Care Physician. These were reviewed in office with patient today.   04/09/2025 lumbar spine x-ray: No acute osseous abnormality, degenerative disc disease    Procedure  Not appropriate at this time.     Shared decision making, patient agreeable to plan.      Return for Follow up after 6-8 wks of formal therapy.    HPI:   Gary J Kocher is a 79 y.o. male  who presents for evaluation of   Chief Complaint   Patient presents with    Spine - Pain     Lower back pain going in right hip        Injury Related: No     Onset/Mechanism: Right-sided low back pain denies any trauma but was recently out dancing 2 weeks ago.  Location: Right low back pain.  Severity: Current severity: 4/10.   Pain described as: Intermittent dull ache  and stiffness  Radiation: No.  Provocative: Stairs, squatting.  Associated symptoms: Denies any radiculopathy or numbness and tingling.    Denies any hx of fracture of affected limb.   Denies any surgical history of affected limb.      Summary of treatment to-date:   Prescription medicine      Following History Reviewed and Updated     Past Medical History:   Diagnosis Date    Diverticulitis w/o hemorrhage 2008    Dyspepsia 2005    Multiple pulmonary nodules 9/24/2013    Nephrolithiasis     Pectus excavatum     PVC (premature ventricular contraction) 03/30/2012     Past Surgical History:   Procedure Laterality Date    CATARACT EXTRACTION Right 06/2018    CATARACT EXTRACTION Left 08/30/2018    COLONOSCOPY  11/17/2021    JEFFREY Capps MD.- Diverticulosis in sigmoid and descending colon; petechia in rectum.    COLONOSCOPY W/ POLYPECTOMY  04/04/2006    PROSTATE BIOPSY  06/03/2016    Prostate Needle Biopsy     SHOULDER SURGERY Left     Arthrocentesis of the shoulder sub-=acronial bursa area    UMBILICAL HERNIA REPAIR       Family History   Problem Relation Age of Onset    Hypertension Mother     Stroke Mother     Diabetes Mother     Gout Father     Alzheimer's disease Father     Diabetes Family         Mellitus       Social History     Substance and Sexual Activity   Alcohol Use No     Social History     Substance and Sexual Activity   Drug Use No     Social History     Tobacco Use   Smoking Status Never    Passive exposure: Never   Smokeless Tobacco Never   Tobacco Comments    no exposure to passive smoke       Social Drivers of Health     Tobacco Use: Low Risk  (4/18/2025)    Patient History     Smoking Tobacco Use: Never     Smokeless Tobacco Use: Never     Passive Exposure: Never   Alcohol Use: Not on file   Financial Resource Strain: Not on file   Food Insecurity: No Food Insecurity (9/17/2024)    Nursing - Inadequate Food Risk Classification     Worried About Running Out of Food in the Last Year: Never true      "Ran Out of Food in the Last Year: Never true     Ran Out of Food in the Last Year: Not on file   Transportation Needs: No Transportation Needs (9/17/2024)    PRAPARE - Transportation     Lack of Transportation (Medical): No     Lack of Transportation (Non-Medical): No   Physical Activity: Not on file   Stress: Not on file   Social Connections: Not on file   Intimate Partner Violence: Not on file   Depression: Not at risk (9/17/2024)    PHQ-2     PHQ-2 Score: 0   Housing Stability: Unknown (9/17/2024)    Housing Stability Vital Sign     Unable to Pay for Housing in the Last Year: No     Number of Times Moved in the Last Year: Not on file     Homeless in the Last Year: No   Utilities: Not At Risk (9/17/2024)    [facility] Utilities     Threatened with loss of utilities: No   Health Literacy: Not on file        Allergies   Allergen Reactions    Gold-Containing Drug Products Rash     1+ POSITIVE PATCH TEST    Parabens Rash     POSITIVE 1+ PATCH TEST       Review of Systems      Review of Systems     Review of Systems   Constitutional: Negative for chills and fever.   HENT: Negative for drooling and sneezing.    Eyes: Negative for redness.   Respiratory: Negative for cough and wheezing.    Gastrointestinal: Negative for vomiting.   Psychiatric/Behavioral: Negative for behavioral problems. The patient is not nervous/anxious.      All other systems negative.   Physical Exam   Physical Exam    Vitals and nursing note reviewed.  Constitutional:   Appearance. Normal Appearance.  Ht 5' 9\" (1.753 m)   Wt 63 kg (139 lb)   BMI 20.53 kg/m²     Body mass index is 20.53 kg/m².   HENT:  Head: Atraumatic.  Nose: Nose normal  Eyes: Conjunctiva/sclera: Conjunctivae normal.  Cardiovascular:   Rate and Rhythm: Bilateral equal distal pulses  Pulmonary:   Effort: Pulmonary effort is normal  Skin:   General: Skin is warm and dry.  Neurological:   General: No focal deficit present.  Mental Status: Alert and oriented to person, place, and time. "   Psychiatric:   Mood and Affect: mood normal.  Behavior: Behavior normal     Musculoskeletal Exam     Ortho Exam     B/L  HIP and BACK     Inspection:  Gait Gross deformity   Normal Negative     TENDERNESS:  Thoracic/Lumbar Spinous Processes Paraspinal Muscles SI Joint: Sacrum ALICIA Greater Trochanteric Bursa   Negative Negative +  Negative     LUMBAR Range of Motion:  Flexion Extension Sidebending Rotation   Intact Intact Intact Intact     HIP Range of Motion:  Hip Supine Supine Prone Prone   Flexion ER IR ER IR   Intact and symmetrical  Intact and symmetrical  Intact and symmetrical        DERMATOMAL SENSATION:  L1 L2 L3 L4 L5 S1   Intact Intact Intact Intact Intact Intact     STRENGTH (bilateral):  Hip flexion Knee Flexion Knee extension Foot dorsiflexion Great toe extension Foot plantarflexion   5/5 5/5 5/5 5/5 5/5 5/5     SPECIAL TESTS:   B/L Hip  Logroll MORENITA FADIR Bianca's Popliteal angle Supine straight leg Prone straight leg   Negative Negative Negative   Discomfort only on right side  N/A     SI JOINT ASIS COMPRESSION TEST:  STORK TEST:  FORTON'S FINGER: MORENITA SI PAIN:   Negative  Negative  N/A Negative  Negative      Special Tests:   Feliz test Bicycle test Adductor squeeze Piriformis TEST:   GAENSLIN'S TEST:  Pubalgia   Supine, unaffected hip is hyperflexed Supine, opposite active b/l hip flexion / extension  Supine, hips flexed 45, active activation of adductors  Lies on unaffected hip with knees flexed and abducts against resistance  Hyperflexed unaffected hip, extended hip has downward force applied  Lying supine, perform sit-up   Negative or without hip flexion contracture of contralateral leg               Neurovascular:  Sensation to light touch Posterior tibial artery   Intact and equal bilaterally Intact and equal bilaterally     (Test not completed if space left blank )         Procedures       Portions of the record may have been created with voice recognition software. Occasional wrong word or  "\"sound alike\" substitutions may have occurred due to the inherent limitations of voice recognition software. Please review the chart carefully and recognize, using context, where substitutions/typographical errors may have occurred.   "

## 2025-04-21 ENCOUNTER — TELEPHONE (OUTPATIENT)
Age: 80
End: 2025-04-21

## 2025-04-21 NOTE — TELEPHONE ENCOUNTER
The patient called the office requesting for the provider to place him back on prednisone due to lower back pain. Patient would like this sent to [facility] pharmacy on [address]. Please review and advise.

## 2025-04-23 ENCOUNTER — TELEPHONE (OUTPATIENT)
Age: 80
End: 2025-04-23

## 2025-04-23 DIAGNOSIS — M17.12 PRIMARY OSTEOARTHRITIS OF LEFT KNEE: Primary | ICD-10-CM

## 2025-04-23 RX ORDER — PREDNISONE 20 MG/1
40 TABLET ORAL DAILY
Qty: 10 TABLET | Refills: 0 | Status: SHIPPED | OUTPATIENT
Start: 2025-04-23 | End: 2025-04-23

## 2025-04-23 RX ORDER — PREDNISONE 20 MG/1
40 TABLET ORAL DAILY
Qty: 10 TABLET | Refills: 0 | Status: SHIPPED | OUTPATIENT
Start: 2025-04-23 | End: 2025-04-28

## 2025-04-23 NOTE — TELEPHONE ENCOUNTER
"Pt called in to see if Dr. Schmidt got his result from  a CT done on 4/8. He said got it done because was having a \"squeaking\" noise coming from his lungs for about a month which is what prompted him to get the image done. Informed pt that he was to follow up in September of 2024 and offered to schedule him a follow up at [facility] office with Dr. Schmidt in August, but pt declined states that needs to be seen sooner by him and would like him to review his CT and then we can give him a call back to schedule the sooner appt.       Please advise  "

## 2025-04-23 NOTE — TELEPHONE ENCOUNTER
Tianna called Patient to schedule ILD 5/21. Patient is seeing urology that day in the PM at [place]. He did not want to have two appts that day w/ the travel.   She scheduled him for 5/28. I did add him to ILD list.

## 2025-04-23 NOTE — TELEPHONE ENCOUNTER
Call made to Mark, made him aware of CT results-- pt states he was aware of results. I did not see documentation of this. Confirmed appt for 5/28. Instructed to call w/ any questions or concerns.

## 2025-05-21 ENCOUNTER — OFFICE VISIT (OUTPATIENT)
Dept: UROLOGY | Facility: HOSPITAL | Age: 80
End: 2025-05-21
Payer: MEDICARE

## 2025-05-21 VITALS
SYSTOLIC BLOOD PRESSURE: 122 MMHG | HEIGHT: 69 IN | HEART RATE: 70 BPM | BODY MASS INDEX: 21.03 KG/M2 | WEIGHT: 142 LBS | DIASTOLIC BLOOD PRESSURE: 72 MMHG | OXYGEN SATURATION: 98 %

## 2025-05-21 DIAGNOSIS — N13.8 BPH WITH OBSTRUCTION/LOWER URINARY TRACT SYMPTOMS: Primary | ICD-10-CM

## 2025-05-21 DIAGNOSIS — C61 PROSTATE CANCER (HCC): ICD-10-CM

## 2025-05-21 DIAGNOSIS — N40.1 BPH WITH OBSTRUCTION/LOWER URINARY TRACT SYMPTOMS: Primary | ICD-10-CM

## 2025-05-21 LAB — POST-VOID RESIDUAL VOLUME, ML POC: 0 ML

## 2025-05-21 PROCEDURE — 51798 US URINE CAPACITY MEASURE: CPT

## 2025-05-21 PROCEDURE — 99214 OFFICE O/P EST MOD 30 MIN: CPT

## 2025-05-21 NOTE — ASSESSMENT & PLAN NOTE
Diagnosed with GG2 prostate cancer in 2016  S/p radiation therapy completed October 2016   Most recent PSA from 3/28/25 was 0.11  Continue to monitor PSA on an annual basis   Component  Ref Range & Units (hover) 9/3/24  7:09 AM 11/21/23  7:21 AM 11/28/22  7:40 AM 11/10/21  8:13 AM 5/25/21  7:05 AM 11/6/20  9:06 AM 5/1/20 12:54 PM   Prostate Specific Antigen Total 0.08 0.08 CM 0.11 CM 0.11 CM 0.2 R, CM 0.2 R, CM 0.2 R, CM     Orders:    PSA Total, Diagnostic; Future

## 2025-05-21 NOTE — PROGRESS NOTES
Name: Gary J Kocher      : 1945      MRN: [id number]  Encounter Provider: Ofelia Birmingham PA-C  Encounter Date: 2025   Encounter department: [facility]  :  Assessment & Plan  BPH with obstruction/lower urinary tract symptoms  Overall content with urinary pattern   Worsening nocturia and daytime frequency noted over the last few months   PVR 0 ml - emptying well   Continue taking flomax 0.4 mg daily as prescribed   Refill as needed   Increase dose to 0.8 mg daily to see if this improves his symptoms   Encouraged increased hydration and avoidance of bladder irritants / constipation   Continue to monitor on an annual basis   Orders:    POCT Measure PVR    Prostate cancer (HCC)  Diagnosed with GG2 prostate cancer in   S/p radiation therapy completed 2016   Most recent PSA from 3/28/25 was 0.11  Continue to monitor PSA on an annual basis   Component  Ref Range & Units (hover) 9/3/24  7:09 AM 23  7:21 AM 22  7:40 AM 11/10/21  8:13 AM 21  7:05 AM 20  9:06 AM 20 12:54 PM   Prostate Specific Antigen Total 0.08 0.08 CM 0.11 CM 0.11 CM 0.2 R, CM 0.2 R, CM 0.2 R, CM     Orders:    PSA Total, Diagnostic; Future        History of Present Illness   Gary J Kocher is a 79 y.o. male who presents to the office for annual follow-up.  He has history of GG 2 prostate cancer which was diagnosed in .  He underwent radiation therapy which was completed in 2016.  His most recent PSA from 3/28/2025 was 0.11.  We will continue to monitor his PSA on an annual basis.  The patient reports worsening nocturia roughly 4-5 times nightly.  He continues taking Flomax 0.4 mg daily.  He is yet to increase his dose to twice daily although this would likely help his urinary pattern.  In the past, the patient was initiated on an overactive bladder medication but he reports he never took the medicine.  At this time, I recommend he begins taking Flomax 0.8 mg daily  "to see if this offers any relief.  If not, he can consider starting an overactive bladder medication such as Ditropan 10 mg daily.  The patient reports he only drinks about 2 glasses of water early in the morning and does not drink throughout the day.  Patient educated that his urine is likely very concentrated and possibly could be contributing to his irritative bladder symptoms as well.  He is going to try and increase his water intake throughout the next few months.  Will see him back on an annual basis.      Review of Systems   Constitutional:  Negative for activity change, chills, fatigue and fever.   Respiratory:  Negative for apnea, cough and shortness of breath.    Cardiovascular:  Negative for chest pain and leg swelling.   Gastrointestinal:  Negative for abdominal distention, abdominal pain, constipation and diarrhea.   Genitourinary:  Positive for frequency. Negative for decreased urine volume, difficulty urinating, dysuria, flank pain, hematuria, penile pain, testicular pain and urgency.   Neurological:  Negative for dizziness and headaches.   Psychiatric/Behavioral: Negative.       Medical History Reviewed by provider this encounter:  Tobacco  Allergies  Meds  Problems  Med Hx  Surg Hx  Fam Hx     .  Medications Ordered Prior to Encounter[1]   Social History     Tobacco Use    Smoking status: Never     Passive exposure: Never    Smokeless tobacco: Never    Tobacco comments:     no exposure to passive smoke   Vaping Use    Vaping status: Never Used   Substance and Sexual Activity    Alcohol use: No    Drug use: No    Sexual activity: Never        Objective   /72 (BP Location: Left arm, Patient Position: Sitting, Cuff Size: Adult)   Pulse 70   Ht 5' 9\" (1.753 m)   Wt 64.4 kg (142 lb)   SpO2 98%   BMI 20.97 kg/m²     Physical Exam  Vitals and nursing note reviewed.   Constitutional:       General: He is not in acute distress.     Appearance: Normal appearance. He is well-developed. He is " not ill-appearing.   HENT:      Head: Normocephalic and atraumatic.     Eyes:      Conjunctiva/sclera: Conjunctivae normal.       Cardiovascular:      Rate and Rhythm: Normal rate and regular rhythm.      Heart sounds: No murmur heard.  Pulmonary:      Effort: Pulmonary effort is normal. No respiratory distress.      Breath sounds: Normal breath sounds.   Abdominal:      General: Abdomen is flat. There is no distension.      Palpations: Abdomen is soft.      Tenderness: There is no abdominal tenderness. There is no right CVA tenderness or left CVA tenderness.     Musculoskeletal:         General: No swelling.      Cervical back: Neck supple.     Skin:     General: Skin is warm and dry.      Capillary Refill: Capillary refill takes less than 2 seconds.     Neurological:      Mental Status: He is alert and oriented to person, place, and time.     Psychiatric:         Mood and Affect: Mood normal.           Results   Lab Results   Component Value Date    PSA 0.11 03/28/2025    PSA 0.08 09/03/2024    PSA 0.08 11/21/2023     Lab Results   Component Value Date    CALCIUM 9.4 03/28/2025     06/18/2016    K 4.1 03/28/2025    CO2 32 03/28/2025     03/28/2025    BUN 18 03/28/2025    CREATININE 0.95 03/28/2025     Lab Results   Component Value Date    WBC 4.5 09/03/2024    HGB 14.1 09/03/2024    HCT 43.8 09/03/2024    MCV 90.7 09/03/2024     09/03/2024       Office Urine Dip  Recent Results (from the past hour)   POCT Measure PVR    Collection Time: 05/21/25  1:15 PM   Result Value Ref Range    POST-VOID RESIDUAL VOLUME, ML POC 0 mL                [1]   Current Outpatient Medications on File Prior to Visit   Medication Sig Dispense Refill    aspirin (ECOTRIN LOW STRENGTH) 81 mg EC tablet Take 81 mg by mouth      atorvastatin (LIPITOR) 10 mg tablet Take 1 tablet (10 mg total) by mouth daily 90 tablet 2    Cholecalciferol (VITAMIN D3) 2000 units capsule Take 1 capsule by mouth in the morning.      digoxin  (LANOXIN) 0.125 mg tablet Take 1 tablet (125 mcg total) by mouth daily 90 tablet 3    hydrocortisone 2.5 % ointment Apply topically 2 (two) times a day To affected area.  Avoid face, groin, armpits 28.35 g 2    nitroglycerin (NITROSTAT) 0.4 mg SL tablet   2    tamsulosin (FLOMAX) 0.4 mg Take 2 capsules (0.8 mg total) by mouth daily with dinner 180 capsule 0     No current facility-administered medications on file prior to visit.

## 2025-05-28 ENCOUNTER — OFFICE VISIT (OUTPATIENT)
Dept: PULMONOLOGY | Facility: CLINIC | Age: 80
End: 2025-05-28
Payer: MEDICARE

## 2025-05-28 VITALS
HEART RATE: 63 BPM | SYSTOLIC BLOOD PRESSURE: 118 MMHG | TEMPERATURE: 96.7 F | OXYGEN SATURATION: 97 % | HEIGHT: 69 IN | DIASTOLIC BLOOD PRESSURE: 68 MMHG | BODY MASS INDEX: 21.15 KG/M2 | WEIGHT: 142.8 LBS

## 2025-05-28 DIAGNOSIS — J84.9 ILD (INTERSTITIAL LUNG DISEASE) (HCC): Primary | ICD-10-CM

## 2025-05-28 PROCEDURE — G2211 COMPLEX E/M VISIT ADD ON: HCPCS | Performed by: INTERNAL MEDICINE

## 2025-05-28 PROCEDURE — 99213 OFFICE O/P EST LOW 20 MIN: CPT | Performed by: INTERNAL MEDICINE

## 2025-05-29 NOTE — ASSESSMENT & PLAN NOTE
Radiologic imaging is consistent with UIP but at this time has not been progressive  Patient asymptomatic and does not report significant clinical symptomatology  Would therefore continue to follow radiographically at this time with 1 year follow-up CAT scan.  If he has progressive symptoms, he will notify me and we will obtain a CAT scan sooner.  Pulmonary function testing also normal aside from mild decline in diffusion   Orders:    CT chest without contrast; Future

## 2025-05-29 NOTE — PROGRESS NOTES
"Follow-up  Visit - Pulmonary Medicine   Name: Gary J Kocher      : 1945      MRN: [id number]  Encounter Provider: Charlie Schmidt MD  Encounter Date: 2025   Encounter department: [facility]  :  Assessment & Plan  ILD (interstitial lung disease) (HCC)  Radiologic imaging is consistent with UIP but at this time has not been progressive  Patient asymptomatic and does not report significant clinical symptomatology  Would therefore continue to follow radiographically at this time with 1 year follow-up CAT scan.  If he has progressive symptoms, he will notify me and we will obtain a CAT scan sooner.  Pulmonary function testing also normal aside from mild decline in diffusion   Orders:    CT chest without contrast; Future      Return in about 1 year (around 2026).    History of Present Illness   Gary J Kocher is a 79 y.o. male who presents for follow-up of interstitial lung disease most consistent with early/mild IPF.  He reports that his breathing is actually better than the last time.  He is not experiencing any significant breathing difficulties at this time.  No chronic cough.  No exercise limitation.  He denies chest pain.  No wheezing.  Weight and appetite have been stable but he is quite thin.    He does not report any other constitutional complaints.  He has not had recent illness.  No cardiac complaints.  Denies any abdominal pain or GERD.  No allergy symptoms or postnasal drip.  No headache or neurologic symptoms.  No skin rash.    Review of systems  Aside from what is mentioned in the HPI, ROS is otherwise negative         Medical History Reviewed by provider this encounter:  Tobacco  Allergies  Meds  Problems  Med Hx  Surg Hx  Fam Hx     .    Objective   /68 (BP Location: Left arm, Patient Position: Sitting, Cuff Size: Standard)   Pulse 63   Temp (!) 96.7 °F (35.9 °C) (Temporal)   Ht 5' 9\" (1.753 m)   Wt 64.8 kg (142 lb 12.8 oz)   SpO2 97%   " BMI 21.09 kg/m²   Physical Exam:   Gen:  Comfortable on room air.  No conversational dyspnea  HEENT:  Conjugate gaze.  sclerae anicteric.  Oropharynx moist  Neck: Trachea is midline. No JVD. No adenopathy  Chest: Chest wall excursion.  No crackles.  Minor inspiratory squeak at the right base  Cardiac: Regular. no murmur  Abdomen:  benign  Extremities: No edema  Neuro:  Normal speech and mentation    Diagnostic Data:    Radiology results:    Most recent CAT scan is from April 2025.  This study does demonstrate very mild UIP pattern of pulmonary fibrosis.    PFT/spirometry results:  Most recent PFT July 2023.  Normal spirometry and lung volumes.  Mild diffusion impairment    Charlie Schmidt MD

## 2025-07-25 ENCOUNTER — TELEPHONE (OUTPATIENT)
Age: 80
End: 2025-07-25

## 2025-07-25 DIAGNOSIS — I47.19 PAROXYSMAL ATRIAL TACHYCARDIA (HCC): Primary | ICD-10-CM

## 2025-07-25 DIAGNOSIS — I25.10 CHRONIC CORONARY ARTERY DISEASE: ICD-10-CM

## 2025-07-25 NOTE — TELEPHONE ENCOUNTER
Patient asking to have CBC added to annual labs. Please advise and send copies of active lab orders to patient via mail. Verified home address is correct. He thanks us for our help!

## 2025-08-05 ENCOUNTER — TELEPHONE (OUTPATIENT)
Age: 80
End: 2025-08-05

## 2025-08-07 DIAGNOSIS — I25.10 CHRONIC CORONARY ARTERY DISEASE: ICD-10-CM

## 2025-08-07 DIAGNOSIS — E03.8 SUBCLINICAL HYPOTHYROIDISM: ICD-10-CM

## 2025-08-07 DIAGNOSIS — I47.19 PAROXYSMAL ATRIAL TACHYCARDIA (HCC): Primary | ICD-10-CM

## 2025-08-18 ENCOUNTER — OFFICE VISIT (OUTPATIENT)
Dept: CARDIOLOGY CLINIC | Facility: CLINIC | Age: 80
End: 2025-08-18
Payer: MEDICARE

## 2025-08-18 VITALS
HEART RATE: 61 BPM | BODY MASS INDEX: 21.33 KG/M2 | DIASTOLIC BLOOD PRESSURE: 70 MMHG | OXYGEN SATURATION: 100 % | SYSTOLIC BLOOD PRESSURE: 124 MMHG | HEIGHT: 69 IN | WEIGHT: 144 LBS

## 2025-08-18 DIAGNOSIS — I25.10 CHRONIC CORONARY ARTERY DISEASE: Primary | ICD-10-CM

## 2025-08-18 DIAGNOSIS — I47.19 PAROXYSMAL ATRIAL TACHYCARDIA (HCC): ICD-10-CM

## 2025-08-18 DIAGNOSIS — I47.29 NON-SUSTAINED VENTRICULAR TACHYCARDIA (HCC): ICD-10-CM

## 2025-08-18 DIAGNOSIS — E78.5 HYPERLIPIDEMIA, UNSPECIFIED HYPERLIPIDEMIA TYPE: ICD-10-CM

## 2025-08-18 LAB
ATRIAL RATE: 61 BPM
P AXIS: 68 DEGREES
PR INTERVAL: 180 MS
QRS AXIS: -13 DEGREES
QRSD INTERVAL: 122 MS
QT INTERVAL: 434 MS
QTC INTERVAL: 436 MS
T WAVE AXIS: 30 DEGREES
VENTRICULAR RATE: 61 BPM

## 2025-08-18 PROCEDURE — 99214 OFFICE O/P EST MOD 30 MIN: CPT | Performed by: INTERNAL MEDICINE

## 2025-08-18 PROCEDURE — 93000 ELECTROCARDIOGRAM COMPLETE: CPT | Performed by: INTERNAL MEDICINE
